# Patient Record
Sex: FEMALE | Race: WHITE | Employment: OTHER | URBAN - NONMETROPOLITAN AREA
[De-identification: names, ages, dates, MRNs, and addresses within clinical notes are randomized per-mention and may not be internally consistent; named-entity substitution may affect disease eponyms.]

---

## 2018-05-24 ENCOUNTER — OFFICE VISIT (OUTPATIENT)
Dept: FAMILY MEDICINE CLINIC | Age: 73
End: 2018-05-24
Payer: MEDICARE

## 2018-05-24 VITALS — HEART RATE: 72 BPM | WEIGHT: 116 LBS | SYSTOLIC BLOOD PRESSURE: 130 MMHG | DIASTOLIC BLOOD PRESSURE: 72 MMHG

## 2018-05-24 DIAGNOSIS — H66.002 ACUTE SUPPURATIVE OTITIS MEDIA OF LEFT EAR WITHOUT SPONTANEOUS RUPTURE OF TYMPANIC MEMBRANE, RECURRENCE NOT SPECIFIED: Primary | ICD-10-CM

## 2018-05-24 PROCEDURE — 99213 OFFICE O/P EST LOW 20 MIN: CPT | Performed by: FAMILY MEDICINE

## 2018-05-24 RX ORDER — MULTIVIT-MIN/IRON/FOLIC ACID/K 18-600-40
CAPSULE ORAL
COMMUNITY

## 2018-05-24 RX ORDER — FLUTICASONE PROPIONATE 50 MCG
1 SPRAY, SUSPENSION (ML) NASAL DAILY
Qty: 1 BOTTLE | Refills: 3 | Status: SHIPPED | OUTPATIENT
Start: 2018-05-24 | End: 2018-06-27 | Stop reason: SDUPTHER

## 2018-05-24 RX ORDER — AMOXICILLIN 875 MG/1
875 TABLET, COATED ORAL 2 TIMES DAILY
Qty: 20 TABLET | Refills: 0 | Status: SHIPPED | OUTPATIENT
Start: 2018-05-24 | End: 2018-06-03

## 2018-05-24 ASSESSMENT — ENCOUNTER SYMPTOMS
SORE THROAT: 0
COUGH: 0
ABDOMINAL PAIN: 0
RHINORRHEA: 0

## 2018-06-02 ENCOUNTER — OFFICE VISIT (OUTPATIENT)
Dept: FAMILY MEDICINE CLINIC | Age: 73
End: 2018-06-02
Payer: MEDICARE

## 2018-06-02 VITALS — HEART RATE: 80 BPM | DIASTOLIC BLOOD PRESSURE: 70 MMHG | WEIGHT: 114 LBS | SYSTOLIC BLOOD PRESSURE: 130 MMHG

## 2018-06-02 DIAGNOSIS — H72.91 CHRONIC OTITIS MEDIA OF RIGHT EAR WITH POSTERIOR PERFORATION: ICD-10-CM

## 2018-06-02 DIAGNOSIS — H66.91 CHRONIC OTITIS MEDIA OF RIGHT EAR WITH POSTERIOR PERFORATION: ICD-10-CM

## 2018-06-02 DIAGNOSIS — H65.23 BILATERAL CHRONIC SEROUS OTITIS MEDIA: ICD-10-CM

## 2018-06-02 DIAGNOSIS — H90.0 CONDUCTIVE HEARING LOSS, BILATERAL: Primary | ICD-10-CM

## 2018-06-02 PROCEDURE — 99213 OFFICE O/P EST LOW 20 MIN: CPT | Performed by: FAMILY MEDICINE

## 2018-06-02 ASSESSMENT — ENCOUNTER SYMPTOMS
DIARRHEA: 0
RHINORRHEA: 0
SORE THROAT: 0

## 2018-06-02 ASSESSMENT — PATIENT HEALTH QUESTIONNAIRE - PHQ9
SUM OF ALL RESPONSES TO PHQ9 QUESTIONS 1 & 2: 0
SUM OF ALL RESPONSES TO PHQ QUESTIONS 1-9: 0
1. LITTLE INTEREST OR PLEASURE IN DOING THINGS: 0
2. FEELING DOWN, DEPRESSED OR HOPELESS: 0

## 2018-06-20 ENCOUNTER — TELEPHONE (OUTPATIENT)
Dept: FAMILY MEDICINE CLINIC | Age: 73
End: 2018-06-20

## 2018-06-27 ENCOUNTER — TELEPHONE (OUTPATIENT)
Dept: FAMILY MEDICINE CLINIC | Age: 73
End: 2018-06-27

## 2018-06-27 DIAGNOSIS — H66.002 ACUTE SUPPURATIVE OTITIS MEDIA OF LEFT EAR WITHOUT SPONTANEOUS RUPTURE OF TYMPANIC MEMBRANE, RECURRENCE NOT SPECIFIED: ICD-10-CM

## 2018-06-27 NOTE — TELEPHONE ENCOUNTER
Spoke with Keeley Cárdenas, stated that they were going to defer any refills at this time but will let us know

## 2018-06-28 RX ORDER — FLUTICASONE PROPIONATE 50 MCG
2 SPRAY, SUSPENSION (ML) NASAL DAILY
Qty: 3 BOTTLE | Refills: 3 | Status: SHIPPED | OUTPATIENT
Start: 2018-06-28 | End: 2019-09-30 | Stop reason: SDUPTHER

## 2019-03-28 ENCOUNTER — OFFICE VISIT (OUTPATIENT)
Dept: FAMILY MEDICINE CLINIC | Age: 74
End: 2019-03-28
Payer: MEDICARE

## 2019-03-28 VITALS
SYSTOLIC BLOOD PRESSURE: 100 MMHG | HEIGHT: 63 IN | DIASTOLIC BLOOD PRESSURE: 58 MMHG | WEIGHT: 107 LBS | HEART RATE: 58 BPM | BODY MASS INDEX: 18.96 KG/M2 | OXYGEN SATURATION: 97 %

## 2019-03-28 DIAGNOSIS — Z87.891 PERSONAL HISTORY OF TOBACCO USE: ICD-10-CM

## 2019-03-28 DIAGNOSIS — Z23 NEED FOR PROPHYLACTIC VACCINATION AND INOCULATION AGAINST VARICELLA: ICD-10-CM

## 2019-03-28 DIAGNOSIS — Z00.00 ROUTINE GENERAL MEDICAL EXAMINATION AT A HEALTH CARE FACILITY: Primary | ICD-10-CM

## 2019-03-28 DIAGNOSIS — Z13.1 SCREENING FOR DIABETES MELLITUS (DM): ICD-10-CM

## 2019-03-28 DIAGNOSIS — Z13.6 SCREENING FOR CARDIOVASCULAR CONDITION: ICD-10-CM

## 2019-03-28 DIAGNOSIS — K13.21: ICD-10-CM

## 2019-03-28 DIAGNOSIS — Z13.220 SCREENING FOR HYPERLIPIDEMIA: ICD-10-CM

## 2019-03-28 DIAGNOSIS — R09.89 BILATERAL CAROTID BRUITS: ICD-10-CM

## 2019-03-28 DIAGNOSIS — M85.89 OSTEOPENIA OF MULTIPLE SITES: ICD-10-CM

## 2019-03-28 DIAGNOSIS — Z11.59 NEED FOR HEPATITIS C SCREENING TEST: ICD-10-CM

## 2019-03-28 DIAGNOSIS — Z78.0 ASYMPTOMATIC MENOPAUSAL STATE: ICD-10-CM

## 2019-03-28 PROCEDURE — G0438 PPPS, INITIAL VISIT: HCPCS | Performed by: FAMILY MEDICINE

## 2019-03-28 ASSESSMENT — PATIENT HEALTH QUESTIONNAIRE - PHQ9
SUM OF ALL RESPONSES TO PHQ QUESTIONS 1-9: 0
2. FEELING DOWN, DEPRESSED OR HOPELESS: 0
SUM OF ALL RESPONSES TO PHQ QUESTIONS 1-9: 0
SUM OF ALL RESPONSES TO PHQ9 QUESTIONS 1 & 2: 0
1. LITTLE INTEREST OR PLEASURE IN DOING THINGS: 0

## 2019-03-28 ASSESSMENT — ENCOUNTER SYMPTOMS
ABDOMINAL PAIN: 0
DIARRHEA: 0
CONSTIPATION: 0
SHORTNESS OF BREATH: 0

## 2019-03-28 ASSESSMENT — LIFESTYLE VARIABLES: HOW OFTEN DO YOU HAVE A DRINK CONTAINING ALCOHOL: 0

## 2019-04-01 LAB
CHOLESTEROL/HDL RATIO: 2.9 RATIO (ref 0–4.5)
CHOLESTEROL: 152 MG/DL (ref 50–200)
HDL, DIRECT: 52 MG/DL (ref 36–68)
HEPATITIS C IGG: NORMAL
LDL CHOLESTEROL CALCULATED: 90.2 MG/DL (ref 0–160)
SIGNAL/CUTOFF: NORMAL
TRIGL SERPL-MCNC: 49 MG/DL (ref 10–250)
VLDLC SERPL CALC-MCNC: 10 MG/DL (ref 0–40)

## 2019-04-03 ENCOUNTER — OFFICE VISIT (OUTPATIENT)
Dept: FAMILY MEDICINE CLINIC | Age: 74
End: 2019-04-03
Payer: MEDICARE

## 2019-04-03 VITALS
SYSTOLIC BLOOD PRESSURE: 116 MMHG | DIASTOLIC BLOOD PRESSURE: 68 MMHG | BODY MASS INDEX: 19.42 KG/M2 | HEART RATE: 79 BPM | WEIGHT: 108.25 LBS | OXYGEN SATURATION: 97 %

## 2019-04-03 DIAGNOSIS — C34.11 MALIGNANT NEOPLASM OF UPPER LOBE OF RIGHT LUNG (HCC): Primary | ICD-10-CM

## 2019-04-03 PROCEDURE — 99214 OFFICE O/P EST MOD 30 MIN: CPT | Performed by: FAMILY MEDICINE

## 2019-04-03 NOTE — PROGRESS NOTES
use, benefit, and side effects of prescribed medications. All patient questions answered. Pt voiced understanding. Reviewed health maintenance. Instructed to continue current medications, diet andexercise. Patient agreed with treatment plan. Follow up as directed.      Electronically signed by Dionicio Keys MD on 4/4/2019

## 2019-04-04 PROBLEM — C34.11 MALIGNANT NEOPLASM OF UPPER LOBE OF RIGHT LUNG (HCC): Status: ACTIVE | Noted: 2019-04-04

## 2019-04-04 ASSESSMENT — ENCOUNTER SYMPTOMS
WHEEZING: 0
SHORTNESS OF BREATH: 0
COUGH: 0

## 2019-04-08 DIAGNOSIS — C34.11 MALIGNANT NEOPLASM OF UPPER LOBE OF RIGHT LUNG (HCC): Primary | ICD-10-CM

## 2019-04-10 LAB — PATHOLOGY REPORT: NORMAL

## 2019-04-11 DIAGNOSIS — C34.11 MALIGNANT NEOPLASM OF UPPER LOBE OF RIGHT LUNG (HCC): Primary | ICD-10-CM

## 2019-04-12 LAB
A/G RATIO: 1.2 RATIO
AGE FOR GFR: 72
ALBUMIN: 3.9 G/DL (ref 3.5–5)
ALK PHOSPHATASE: 89 UNITS/L (ref 38–126)
ALT SERPL-CCNC: 22 UNITS/L (ref 9–52)
ANION GAP SERPL CALCULATED.3IONS-SCNC: 8 MMOL/L
AST SERPL-CCNC: 22 UNITS/L (ref 14–36)
BASOPHILS # BLD: 0.11 THOU/MM3 (ref 0–0.3)
BILIRUB SERPL-MCNC: 0.3 MG/DL (ref 0.2–1.3)
BUN BLDV-MCNC: 17 MG/DL (ref 7–17)
CALCIUM SERPL-MCNC: 9.2 MG/DL (ref 8.4–10.2)
CHLORIDE BLD-SCNC: 104 MMOL/L (ref 98–120)
CO2: 29 MMOL/L (ref 22–31)
CREAT SERPL-MCNC: 0.6 MG/DL (ref 0.5–1)
DIFFERENTIAL: AUTOMATED DIFF
EGFR BF: 119 ML/MIN/1.73 M2
EGFR BM: 161 ML/MIN/1.73 M2
EGFR WF: 98 ML/MIN/1.73 M2
EGFR WM: 132 ML/MIN/1.73 M2
EOSINOPHIL # BLD: 0.12 THOU/MM3 (ref 0–1.1)
GLOBULIN: 3.2 G/DL
GLUCOSE: 116 MG/DL (ref 65–105)
HCT VFR BLD CALC: 38.2 % (ref 37–47)
HEMOGLOBIN: 12.6 G/DL (ref 12–16)
LYMPHOCYTES # BLD: 1.25 THOU/MM3 (ref 1–5.5)
MCH RBC QN AUTO: 30.1 PG (ref 28.5–32)
MCHC RBC AUTO-ENTMCNC: 33 G/DL (ref 32–37)
MCV RBC AUTO: 91.4 FL (ref 80–94)
MONOCYTES # BLD: 1.16 THOU/MM3 (ref 0.1–1)
NEUTROPHILS: 10.68 THOU/MM3 (ref 2–8.1)
PDW BLD-RTO: 11.8 % (ref 8.5–15.5)
PLATELET # BLD: 370 THOU/MM3 (ref 130–400)
PMV BLD AUTO: 5.7 FL (ref 7.4–11)
POTASSIUM SERPL-SCNC: 4.9 MMOL/L (ref 3.6–5)
RBC # BLD: 4.18 M/UL (ref 4.2–5.4)
SODIUM BLD-SCNC: 136 MMOL/L (ref 135–145)
T4 TOTAL: NORMAL MG/DL
TOTAL PROTEIN: 7.1 G/DL (ref 6.3–8.2)
TSH SERPL DL<=0.05 MIU/L-ACNC: 2.57 MIU/ML (ref 0.49–4.6)
WBC # BLD: 13.32 THOU/ML3 (ref 4.8–10)

## 2019-04-16 ENCOUNTER — TELEPHONE (OUTPATIENT)
Dept: FAMILY MEDICINE CLINIC | Age: 74
End: 2019-04-16

## 2019-04-16 ENCOUNTER — TELEPHONE (OUTPATIENT)
Dept: SURGERY | Age: 74
End: 2019-04-16

## 2019-04-16 NOTE — TELEPHONE ENCOUNTER
Needs TCM call. D/c 4/15 from [facility]. TCM visit on 4/18. Also, had chest xray this morning and would like to know results ASAP. Call Carolina leal at [phone].

## 2019-04-16 NOTE — TELEPHONE ENCOUNTER
Jennifer [facility] Transitions Initial Follow Up Call    Outreach made within 2 business days of discharge: Yes    Patient: Ana Sherman Patient : 1945   MRN: [id number]  Reason for Admission:chestbiopsy and chest tube placement  Discharge Date: 4/15/2019      Spoke with: Griselda Jina, doing well and had bowel movement  Discharge department/facility: [facility]    TCM Interactive Patient Contact:  Was patient able to fill all prescriptions: Yes  Was patient instructed to bring all medications to the follow-up visit: Yes  Is patient taking all medications as directed in the discharge summary?  Yes  Does patient understand their discharge instructions: Yes  Does patient have questions or concerns that need addressed prior to 7-14 day follow up office visit: no, they have already heard from Dr. Nayely Field office regarding the chest xray that was obtained this morning    Scheduled appointment with PCP within 7-14 days    Follow Up  Future Appointments   Date Time Provider Ignacio Gracia   2019 10:00 AM MD Romie Falcon LPN

## 2019-04-18 ENCOUNTER — TELEPHONE (OUTPATIENT)
Dept: FAMILY MEDICINE CLINIC | Age: 74
End: 2019-04-18

## 2019-04-18 ENCOUNTER — OFFICE VISIT (OUTPATIENT)
Dept: FAMILY MEDICINE CLINIC | Age: 74
End: 2019-04-18
Payer: MEDICARE

## 2019-04-18 VITALS
SYSTOLIC BLOOD PRESSURE: 100 MMHG | DIASTOLIC BLOOD PRESSURE: 64 MMHG | BODY MASS INDEX: 18.84 KG/M2 | WEIGHT: 105 LBS | OXYGEN SATURATION: 97 % | HEART RATE: 78 BPM

## 2019-04-18 DIAGNOSIS — C34.11 MALIGNANT NEOPLASM OF UPPER LOBE OF RIGHT LUNG (HCC): ICD-10-CM

## 2019-04-18 DIAGNOSIS — J41.0 SIMPLE CHRONIC BRONCHITIS (HCC): ICD-10-CM

## 2019-04-18 DIAGNOSIS — J95.811 POSTPROCEDURAL PNEUMOTHORAX: Primary | ICD-10-CM

## 2019-04-18 PROCEDURE — 99496 TRANSJ CARE MGMT HIGH F2F 7D: CPT | Performed by: FAMILY MEDICINE

## 2019-04-18 PROCEDURE — 1111F DSCHRG MED/CURRENT MED MERGE: CPT | Performed by: FAMILY MEDICINE

## 2019-04-18 RX ORDER — GREEN TEA/HOODIA GORDONII 315-12.5MG
1 CAPSULE ORAL DAILY
Qty: 30 TABLET | Refills: 0 | COMMUNITY
Start: 2019-04-18 | End: 2019-05-03

## 2019-04-18 RX ORDER — IBUPROFEN 200 MG
200 TABLET ORAL EVERY 6 HOURS PRN
COMMUNITY

## 2019-04-18 RX ORDER — HYDROCODONE BITARTRATE AND ACETAMINOPHEN 5; 325 MG/1; MG/1
TABLET ORAL
Refills: 0 | COMMUNITY
Start: 2019-04-15 | End: 2019-10-07 | Stop reason: ALTCHOICE

## 2019-04-18 NOTE — TELEPHONE ENCOUNTER
[employer] called, stated that she did receive a call from Dr. Gustavo Wakefield office, she called back and was told that he is booked out until August or September, but, they do have another provider there Bushra Hernandez, that she can get into see on May 28th, is this alright with you?? Lauren Kaur to call and schedule her appointment with him because at least she would have one scheduled. Eliana Castañeda agreed and will call and schedule

## 2019-04-18 NOTE — PROGRESS NOTES
normal and breath sounds normal. No respiratory distress. She has no wheezes. Overall symmetric BS   Abdominal: Soft. Neurological: She is alert and oriented to person, place, and time. No cranial nerve deficit. Coordination normal.   Skin: Skin is warm. Psychiatric: She has a normal mood and affect. Her behavior is normal. Judgment and thought content normal.   Nursing note and vitals reviewed. Assessment/Plan:  1. Postprocedural pneumothorax  CXR since discharge has been stable, no recurrance. 2. Malignant neoplasm of upper lobe of right lung ([facility] [address]  Continue with the oncology and the pulmonology follow up as planned at this time  - [facility] MED LIST    3. Simple chronic bronchitis ([facility] [address]  Will refer for full PFT's will need these to help plan her treatment with her cancer. Orders placed today  - [id number] - LA BREATHING CAPACITY TEST  - LA BREATHING CAPACITY TEST EVAL BRONCHOSPASM EVAL AFTER BRONCHODIALTOR        Would consider AAS and stool study if the bowels are not improving in a week.     Medical Decision Making: high complexity

## 2019-04-19 ENCOUNTER — TELEPHONE (OUTPATIENT)
Dept: FAMILY MEDICINE CLINIC | Age: 74
End: 2019-04-19

## 2019-04-19 NOTE — TELEPHONE ENCOUNTER
[place]'s daughter Mei Serrano called  Wanted you to be aware that her PET scan will not be on Monday it will be in two weeks May 6th at 1:00 pm.

## 2019-04-22 ENCOUNTER — NURSE ONLY (OUTPATIENT)
Dept: FAMILY MEDICINE CLINIC | Age: 74
End: 2019-04-22

## 2019-04-22 DIAGNOSIS — Z48.02 VISIT FOR SUTURE REMOVAL: ICD-10-CM

## 2019-04-22 NOTE — TELEPHONE ENCOUNTER
Patient in office stated that she did see Dr. Shea [place] this am due to someone cancelling apointment

## 2019-04-22 NOTE — TELEPHONE ENCOUNTER
Notified Brandon Brock, daughter-in-law. She also let us know that Elzbieta's PET scan got denied and so is being postponed until an appeal can be done.

## 2019-04-28 PROBLEM — J41.0 SIMPLE CHRONIC BRONCHITIS (HCC): Status: ACTIVE | Noted: 2019-04-28

## 2019-05-23 ENCOUNTER — OFFICE VISIT (OUTPATIENT)
Dept: FAMILY MEDICINE CLINIC | Age: 74
End: 2019-05-23
Payer: MEDICARE

## 2019-05-23 VITALS
SYSTOLIC BLOOD PRESSURE: 110 MMHG | DIASTOLIC BLOOD PRESSURE: 64 MMHG | OXYGEN SATURATION: 98 % | WEIGHT: 111 LBS | HEART RATE: 74 BPM | BODY MASS INDEX: 19.91 KG/M2

## 2019-05-23 DIAGNOSIS — F41.9 ANXIETY: ICD-10-CM

## 2019-05-23 DIAGNOSIS — R35.0 FREQUENCY OF URINATION: ICD-10-CM

## 2019-05-23 LAB
BILIRUBIN, POC: ABNORMAL
BLOOD URINE, POC: ABNORMAL
CHP ED QC CHECK: NORMAL
CLARITY, POC: CLEAR
COLOR, POC: YELLOW
GLUCOSE BLD-MCNC: 135 MG/DL
GLUCOSE URINE, POC: ABNORMAL
KETONES, POC: ABNORMAL
LEUKOCYTE EST, POC: ABNORMAL
NITRITE, POC: ABNORMAL
PH, POC: 5
PROTEIN, POC: ABNORMAL
SPECIFIC GRAVITY, POC: 1.01
URINE CULTURE, ROUTINE: NORMAL
UROBILINOGEN, POC: ABNORMAL

## 2019-05-23 PROCEDURE — 99214 OFFICE O/P EST MOD 30 MIN: CPT | Performed by: FAMILY MEDICINE

## 2019-05-23 PROCEDURE — 81002 URINALYSIS NONAUTO W/O SCOPE: CPT | Performed by: FAMILY MEDICINE

## 2019-05-23 PROCEDURE — 82962 GLUCOSE BLOOD TEST: CPT | Performed by: FAMILY MEDICINE

## 2019-05-23 RX ORDER — ESCITALOPRAM OXALATE 10 MG/1
10 TABLET ORAL DAILY
Qty: 30 TABLET | Refills: 5 | Status: SHIPPED | OUTPATIENT
Start: 2019-05-23 | End: 2019-10-30 | Stop reason: SDUPTHER

## 2019-05-23 RX ORDER — PROCHLORPERAZINE MALEATE 10 MG
1 TABLET ORAL EVERY 6 HOURS PRN
Refills: 0 | COMMUNITY
Start: 2019-05-16 | End: 2021-03-29

## 2019-05-23 NOTE — PROGRESS NOTES
[address] [address] [facility], [address] [place] [address]  Dept: [phone]  Dept ZOW:[phone]    Cherri Nur is a 68 y.o. female who presents today for her medical conditions/complaints as notedbelow. Cherri Nur is c/o of 1 Month Follow-Up (felling really tired, not getting enough sleep through out the night urinating all the time denies any pain does not pay any attention during the day regarding the urination)      HPI:     HPI  Has not been sleeping well at night, is exhausted. Is up about 5 times at night urinating. Every time she gets up it is a lot. Cannot even take a nap at night because she cannot sleep. Will go back to sleep usually but sometimes will be up for hours. Has been anxious about the entire process overall. Has been OK overall. Thinks she is tolerating it ok most of the time though.       BP Readings from Last 3 Encounters:   05/23/19 110/64   04/18/19 100/64   04/03/19 116/68          (goal 120/80)     Wt Readings from Last 3 Encounters:   05/23/19 111 lb (50.3 kg)   04/18/19 105 lb (47.6 kg)   04/03/19 108 lb 4 oz (49.1 kg)        Past Medical History:   Diagnosis Date    Leukoplakia, gingiva     Osteopenia       Past Surgical History:   Procedure Laterality Date    BRONCHOSCOPY  05/02/2019    CATARACT REMOVAL Bilateral     COLONOSCOPY  2009    tubular adenoma with low grade dysplasia     TONSILLECTOMY AND ADENOIDECTOMY      TUBAL LIGATION         Family History   Problem Relation Age of Onset    Cancer Mother         multiple myeloma    Cancer Father         throat cancer    Thyroid Disease Sister     Thyroid Disease Sister     Thyroid Disease Daughter        Social History     Tobacco Use    Smoking status: Current Every Day Smoker     Packs/day: 0.50     Years: 53.00     Pack years: 26.50     Types: Cigarettes     Start date: 3/28/1966    Smokeless tobacco: Never Used    Tobacco comment: up to 1 ppd   Substance Use Topics    no mass. There is no tenderness. There is no guarding. Neurological: She is alert and oriented to person, place, and time. No cranial nerve deficit. Coordination normal.   Skin: Skin is warm. Psychiatric: She has a normal mood and affect. Her behavior is normal. Judgment and thought content normal.   Nursing note and vitals reviewed. Assessment/Plan:      Diagnosis Orders   1. Anxiety  escitalopram (LEXAPRO) 10 MG tablet   2. Frequency of urination  POCT Urinalysis no Micro    Urine Culture    POCT Glucose     Will treat the anxiety and hopefully this will allow Elzbieta to sleep better. Will check the urine culture in light of the chemo to ensure low grade infection is not overlooked. No evidence of DMi developing with the check at this time  Start the escitalopram 1/2 tablet daily at night. In about 1 week increase this to 1 full tablet at night. Moderate complexity. Lab Results   Component Value Date    WBC 10.98 (H) 05/22/2019    HGB 12.5 05/22/2019    HCT 39.0 05/22/2019     (H) 05/22/2019    CHOL 152 04/01/2019    TRIG 49 04/01/2019    ALT 46 05/22/2019    AST 43 (H) 05/22/2019     05/22/2019    K 4.2 05/22/2019     05/22/2019    CREATININE 0.6 05/22/2019    BUN 20 (H) 05/22/2019    CO2 27 05/22/2019    TSH 2.57 04/12/2019    INR 1.0 05/13/2019       Return in about 1 month (around 6/20/2019). Patient given educational materials - see patientinstructions. Discussed use, benefit, and side effects of prescribed medications. All patient questions answered. Pt voiced understanding. Reviewed health maintenance. Instructed to continue current medications, diet andexercise. Patient agreed with treatment plan. Follow up as directed.      Electronically signed by Elia Duane, MD on 5/26/2019

## 2019-05-23 NOTE — PATIENT INSTRUCTIONS
Start the escitalopram 1/2 tablet daily at night. In about 1 week increase this to 1 full tablet at night.

## 2019-06-21 ENCOUNTER — TELEPHONE (OUTPATIENT)
Dept: FAMILY MEDICINE CLINIC | Age: 74
End: 2019-06-21

## 2019-06-21 RX ORDER — VITAMIN E (DL,TOCOPHERYL ACET) 180 MG
1 CAPSULE ORAL DAILY
Qty: 30 CAPSULE | Refills: 5 | Status: SHIPPED | OUTPATIENT
Start: 2019-06-21 | End: 2019-09-30 | Stop reason: ALTCHOICE

## 2019-06-21 NOTE — TELEPHONE ENCOUNTER
Script sent to [facility]-- please notify the patient and oncology. Let patient know does occasionally cause diarrhea-- ifthis increases may need to cut the dose down.

## 2019-09-27 ENCOUNTER — TELEPHONE (OUTPATIENT)
Dept: FAMILY MEDICINE CLINIC | Age: 74
End: 2019-09-27

## 2019-09-27 DIAGNOSIS — R11.0 NAUSEA: Primary | ICD-10-CM

## 2019-09-27 RX ORDER — ONDANSETRON 4 MG/1
4 TABLET, FILM COATED ORAL DAILY PRN
Qty: 30 TABLET | Refills: 0 | Status: SHIPPED | OUTPATIENT
Start: 2019-09-27 | End: 2021-03-29

## 2019-09-30 ENCOUNTER — OFFICE VISIT (OUTPATIENT)
Dept: FAMILY MEDICINE CLINIC | Age: 74
End: 2019-09-30
Payer: MEDICARE

## 2019-09-30 VITALS
BODY MASS INDEX: 18.84 KG/M2 | HEART RATE: 87 BPM | DIASTOLIC BLOOD PRESSURE: 62 MMHG | WEIGHT: 105 LBS | SYSTOLIC BLOOD PRESSURE: 104 MMHG | OXYGEN SATURATION: 94 %

## 2019-09-30 DIAGNOSIS — C34.11 MALIGNANT NEOPLASM OF UPPER LOBE OF RIGHT LUNG (HCC): ICD-10-CM

## 2019-09-30 DIAGNOSIS — J41.0 SIMPLE CHRONIC BRONCHITIS (HCC): ICD-10-CM

## 2019-09-30 DIAGNOSIS — E86.0 DEHYDRATION: ICD-10-CM

## 2019-09-30 DIAGNOSIS — R19.7 DIARRHEA OF PRESUMED INFECTIOUS ORIGIN: Primary | ICD-10-CM

## 2019-09-30 LAB
A/G RATIO: 1.2 RATIO
AGE FOR GFR: 72
ALBUMIN: 3.8 G/DL (ref 3.5–5)
ALK PHOSPHATASE: 83 UNITS/L (ref 38–126)
ALT SERPL-CCNC: 22 UNITS/L (ref 9–52)
ANION GAP SERPL CALCULATED.3IONS-SCNC: 17 MMOL/L
AST SERPL-CCNC: 30 UNITS/L (ref 14–36)
BASOPHILS # BLD: 0.11 THOU/MM3 (ref 0–0.3)
BILIRUB SERPL-MCNC: 0.3 MG/DL (ref 0.2–1.3)
BUN BLDV-MCNC: 19 MG/DL (ref 7–17)
C DIFF TOXIN: NORMAL
CALCIUM SERPL-MCNC: 9.5 MG/DL (ref 8.4–10.2)
CHLORIDE BLD-SCNC: 108 MMOL/L (ref 98–120)
CO2: 19 MMOL/L (ref 22–31)
CREAT SERPL-MCNC: 1 MG/DL (ref 0.5–1)
CULTURE, STOOL: NORMAL
DIFFERENTIAL: AUTOMATED DIFF
EGFR BF: 66 ML/MIN/1.73 M2
EGFR BM: 89 ML/MIN/1.73 M2
EGFR WF: 55 ML/MIN/1.73 M2
EGFR WM: 73 ML/MIN/1.73 M2
EOSINOPHIL # BLD: 0.09 THOU/MM3 (ref 0–1.1)
GLOBULIN: 3.3 G/DL
GLUCOSE: 112 MG/DL (ref 65–105)
HCT VFR BLD CALC: 38.1 % (ref 37–47)
HEMOCCULT STL QL: POSITIVE
HEMOGLOBIN: 11.9 G/DL (ref 12–16)
LYMPHOCYTES # BLD: 0.45 THOU/MM3 (ref 1–5.5)
MAGNESIUM: 1.7 MG/DL (ref 1.6–2.3)
MCH RBC QN AUTO: 30 PG (ref 28.5–32)
MCHC RBC AUTO-ENTMCNC: 31.1 G/DL (ref 32–37)
MCV RBC AUTO: 96.4 FL (ref 80–94)
MONOCYTES # BLD: 0.85 THOU/MM3 (ref 0.1–1)
NEUTROPHILS: 3.48 THOU/MM3 (ref 2–8.1)
PDW BLD-RTO: 13 % (ref 8.5–15.5)
PLATELET # BLD: 314 THOU/MM3 (ref 130–400)
PMV BLD AUTO: 5.8 FL (ref 7.4–11)
POTASSIUM SERPL-SCNC: 4.1 MMOL/L (ref 3.6–5)
RBC # BLD: 3.95 M/UL (ref 4.2–5.4)
SODIUM BLD-SCNC: 140 MMOL/L (ref 135–145)
STOOL FOR WBC: NORMAL
TOTAL PROTEIN: 7.1 G/DL (ref 6.3–8.2)
WBC # BLD: 4.97 THOU/ML3 (ref 4.8–10)

## 2019-09-30 RX ORDER — FLUTICASONE PROPIONATE 50 MCG
2 SPRAY, SUSPENSION (ML) NASAL DAILY
Qty: 3 BOTTLE | Refills: 3 | Status: SHIPPED | OUTPATIENT
Start: 2019-09-30 | End: 2020-10-12 | Stop reason: SDUPTHER

## 2019-09-30 RX ORDER — ACETAMINOPHEN 500 MG
500 TABLET ORAL EVERY 6 HOURS PRN
COMMUNITY

## 2019-09-30 RX ORDER — LIDOCAINE AND PRILOCAINE 25; 25 MG/G; MG/G
CREAM TOPICAL
Refills: 0 | COMMUNITY
Start: 2019-09-20

## 2019-10-01 LAB
A/G RATIO: 1 RATIO
AGE FOR GFR: 73
ALBUMIN: 2.8 G/DL (ref 3.5–5)
ALK PHOSPHATASE: 65 UNITS/L (ref 38–126)
ALT SERPL-CCNC: 27 UNITS/L (ref 9–52)
ANION GAP SERPL CALCULATED.3IONS-SCNC: 11 MMOL/L
AST SERPL-CCNC: 21 UNITS/L (ref 14–36)
BASOPHILS # BLD: 0.05 THOU/MM3 (ref 0–0.3)
BILIRUB SERPL-MCNC: 0.2 MG/DL (ref 0.2–1.3)
BUN BLDV-MCNC: 12 MG/DL (ref 7–17)
CALCIUM SERPL-MCNC: 8.2 MG/DL (ref 8.4–10.2)
CHLORIDE BLD-SCNC: 115 MMOL/L (ref 98–120)
CO2: 16 MMOL/L (ref 22–31)
CREAT SERPL-MCNC: 0.7 MG/DL (ref 0.5–1)
DIFFERENTIAL: AUTOMATED DIFF
EGFR BF: 99 ML/MIN/1.73 M2
EGFR BM: 134 ML/MIN/1.73 M2
EGFR WF: 82 ML/MIN/1.73 M2
EGFR WM: 111 ML/MIN/1.73 M2
EOSINOPHIL # BLD: 0.22 THOU/MM3 (ref 0–1.1)
GLOBULIN: 2.8 G/DL
GLUCOSE: 90 MG/DL (ref 65–105)
HCT VFR BLD CALC: 31.1 % (ref 37–47)
HEMOGLOBIN: 9.8 G/DL (ref 12–16)
LYMPHOCYTES # BLD: 0.66 THOU/MM3 (ref 1–5.5)
MCH RBC QN AUTO: 29.8 PG (ref 28.5–32)
MCHC RBC AUTO-ENTMCNC: 31.5 G/DL (ref 32–37)
MCV RBC AUTO: 94.8 FL (ref 80–94)
MONOCYTES # BLD: 0.9 THOU/MM3 (ref 0.1–1)
NEUTROPHILS: 1.59 THOU/MM3 (ref 2–8.1)
PDW BLD-RTO: 12.9 % (ref 8.5–15.5)
PLATELET # BLD: 280 THOU/MM3 (ref 130–400)
PMV BLD AUTO: 6.1 FL (ref 7.4–11)
POTASSIUM SERPL-SCNC: 3.3 MMOL/L (ref 3.6–5)
RBC # BLD: 3.28 M/UL (ref 4.2–5.4)
SODIUM BLD-SCNC: 139 MMOL/L (ref 135–145)
TOTAL PROTEIN: 5.6 G/DL (ref 6.3–8.2)
WBC # BLD: 3.43 THOU/ML3 (ref 4.8–10)

## 2019-10-01 ASSESSMENT — ENCOUNTER SYMPTOMS
SHORTNESS OF BREATH: 0
CHOKING: 0
COUGH: 0
WHEEZING: 0

## 2019-10-02 LAB
AGE FOR GFR: 73
ANION GAP SERPL CALCULATED.3IONS-SCNC: 12 MMOL/L
BASOPHILS # BLD: 0.09 THOU/MM3 (ref 0–0.3)
BUN BLDV-MCNC: 7 MG/DL (ref 7–17)
CHLORIDE BLD-SCNC: 113 MMOL/L (ref 98–120)
CO2: 18 MMOL/L (ref 22–31)
CREAT SERPL-MCNC: 0.7 MG/DL (ref 0.5–1)
DIFFERENTIAL: AUTOMATED DIFF
EGFR BF: 99 ML/MIN/1.73 M2
EGFR BM: 134 ML/MIN/1.73 M2
EGFR WF: 82 ML/MIN/1.73 M2
EGFR WM: 111 ML/MIN/1.73 M2
EOSINOPHIL # BLD: 0.33 THOU/MM3 (ref 0–1.1)
HCT VFR BLD CALC: 30.3 % (ref 37–47)
HEMOGLOBIN: 9.9 G/DL (ref 12–16)
LYMPHOCYTES # BLD: 0.57 THOU/MM3 (ref 1–5.5)
MCH RBC QN AUTO: 31.3 PG (ref 28.5–32)
MCHC RBC AUTO-ENTMCNC: 32.8 G/DL (ref 32–37)
MCV RBC AUTO: 95.5 FL (ref 80–94)
MONOCYTES # BLD: 0.93 THOU/MM3 (ref 0.1–1)
NEUTROPHILS: 2.75 THOU/MM3 (ref 2–8.1)
PDW BLD-RTO: 12.9 % (ref 8.5–15.5)
PLATELET # BLD: 249 THOU/MM3 (ref 130–400)
PMV BLD AUTO: 5.8 FL (ref 7.4–11)
POTASSIUM SERPL-SCNC: 3.8 MMOL/L (ref 3.6–5)
RBC # BLD: 3.17 M/UL (ref 4.2–5.4)
SODIUM BLD-SCNC: 139 MMOL/L (ref 135–145)
WBC # BLD: 4.68 THOU/ML3 (ref 4.8–10)

## 2019-10-06 ENCOUNTER — TELEPHONE (OUTPATIENT)
Dept: FAMILY MEDICINE CLINIC | Age: 74
End: 2019-10-06

## 2019-10-07 ENCOUNTER — TELEPHONE (OUTPATIENT)
Dept: FAMILY MEDICINE CLINIC | Age: 74
End: 2019-10-07

## 2019-10-07 ENCOUNTER — OFFICE VISIT (OUTPATIENT)
Dept: FAMILY MEDICINE CLINIC | Age: 74
End: 2019-10-07
Payer: MEDICARE

## 2019-10-07 VITALS
BODY MASS INDEX: 20.44 KG/M2 | WEIGHT: 113.9 LBS | SYSTOLIC BLOOD PRESSURE: 118 MMHG | OXYGEN SATURATION: 96 % | DIASTOLIC BLOOD PRESSURE: 72 MMHG | HEART RATE: 86 BPM

## 2019-10-07 DIAGNOSIS — R19.7 DIARRHEA OF PRESUMED INFECTIOUS ORIGIN: Primary | ICD-10-CM

## 2019-10-07 DIAGNOSIS — C34.11 MALIGNANT NEOPLASM OF UPPER LOBE OF RIGHT LUNG (HCC): ICD-10-CM

## 2019-10-07 DIAGNOSIS — E86.0 DEHYDRATION: ICD-10-CM

## 2019-10-07 DIAGNOSIS — F41.9 ANXIETY: ICD-10-CM

## 2019-10-07 PROCEDURE — 99495 TRANSJ CARE MGMT MOD F2F 14D: CPT | Performed by: FAMILY MEDICINE

## 2019-10-07 RX ORDER — DICYCLOMINE HYDROCHLORIDE 10 MG/1
CAPSULE ORAL
Refills: 0 | COMMUNITY
Start: 2019-10-04 | End: 2019-10-18 | Stop reason: SDUPTHER

## 2019-10-07 RX ORDER — PREDNISONE 20 MG/1
TABLET ORAL
Refills: 0 | COMMUNITY
Start: 2019-10-04 | End: 2019-10-07 | Stop reason: ALTCHOICE

## 2019-10-07 RX ORDER — MONTELUKAST SODIUM 4 MG/1
1 TABLET, CHEWABLE ORAL 2 TIMES DAILY
Qty: 60 TABLET | Refills: 3 | Status: SHIPPED | OUTPATIENT
Start: 2019-10-07 | End: 2020-08-26

## 2019-10-07 RX ORDER — MIRTAZAPINE 15 MG/1
TABLET, FILM COATED ORAL
Refills: 0 | COMMUNITY
Start: 2019-10-04 | End: 2019-10-30 | Stop reason: SDUPTHER

## 2019-10-09 ENCOUNTER — TELEPHONE (OUTPATIENT)
Dept: FAMILY MEDICINE CLINIC | Age: 74
End: 2019-10-09

## 2019-10-18 ENCOUNTER — TELEPHONE (OUTPATIENT)
Dept: FAMILY MEDICINE CLINIC | Age: 74
End: 2019-10-18

## 2019-10-18 RX ORDER — DICYCLOMINE HYDROCHLORIDE 10 MG/1
20 CAPSULE ORAL 4 TIMES DAILY PRN
Qty: 120 CAPSULE | Refills: 2 | Status: SHIPPED | OUTPATIENT
Start: 2019-10-18 | End: 2019-11-29 | Stop reason: SDUPTHER

## 2019-10-18 RX ORDER — DICYCLOMINE HYDROCHLORIDE 10 MG/1
CAPSULE ORAL
Qty: 120 CAPSULE | Refills: 0 | Status: SHIPPED | OUTPATIENT
Start: 2019-10-18 | End: 2019-10-18 | Stop reason: SDUPTHER

## 2019-10-30 ENCOUNTER — OFFICE VISIT (OUTPATIENT)
Dept: FAMILY MEDICINE CLINIC | Age: 74
End: 2019-10-30
Payer: MEDICARE

## 2019-10-30 VITALS
DIASTOLIC BLOOD PRESSURE: 72 MMHG | SYSTOLIC BLOOD PRESSURE: 114 MMHG | OXYGEN SATURATION: 96 % | HEART RATE: 77 BPM | WEIGHT: 109.3 LBS | BODY MASS INDEX: 19.61 KG/M2

## 2019-10-30 DIAGNOSIS — F41.9 ANXIETY: ICD-10-CM

## 2019-10-30 DIAGNOSIS — C34.12 MALIGNANT NEOPLASM OF UPPER LOBE OF LEFT LUNG (HCC): ICD-10-CM

## 2019-10-30 DIAGNOSIS — K52.9 CHRONIC DIARRHEA OF UNKNOWN ORIGIN: Primary | ICD-10-CM

## 2019-10-30 PROBLEM — C34.10 LUNG CANCER, UPPER LOBE (HCC): Status: ACTIVE | Noted: 2019-01-01

## 2019-10-30 PROCEDURE — 99214 OFFICE O/P EST MOD 30 MIN: CPT | Performed by: FAMILY MEDICINE

## 2019-10-30 RX ORDER — MIRTAZAPINE 15 MG/1
TABLET, FILM COATED ORAL
Qty: 15 TABLET | Refills: 5 | Status: SHIPPED | OUTPATIENT
Start: 2019-10-30 | End: 2019-11-04 | Stop reason: ALTCHOICE

## 2019-10-30 RX ORDER — ESCITALOPRAM OXALATE 10 MG/1
15 TABLET ORAL DAILY
Qty: 45 TABLET | Refills: 5 | Status: SHIPPED | OUTPATIENT
Start: 2019-10-30 | End: 2019-11-18

## 2019-11-04 ENCOUNTER — OFFICE VISIT (OUTPATIENT)
Dept: FAMILY MEDICINE CLINIC | Age: 74
End: 2019-11-04
Payer: MEDICARE

## 2019-11-04 VITALS
DIASTOLIC BLOOD PRESSURE: 74 MMHG | OXYGEN SATURATION: 94 % | SYSTOLIC BLOOD PRESSURE: 122 MMHG | WEIGHT: 105.6 LBS | BODY MASS INDEX: 18.95 KG/M2 | HEART RATE: 73 BPM

## 2019-11-04 DIAGNOSIS — F41.8 ANXIETY WITH DEPRESSION: ICD-10-CM

## 2019-11-04 DIAGNOSIS — Z86.59 MENTAL STATUS CHANGE RESOLVED: ICD-10-CM

## 2019-11-04 DIAGNOSIS — K52.9 CHRONIC DIARRHEA OF UNKNOWN ORIGIN: Primary | ICD-10-CM

## 2019-11-04 DIAGNOSIS — C34.12 MALIGNANT NEOPLASM OF UPPER LOBE OF LEFT LUNG (HCC): ICD-10-CM

## 2019-11-04 DIAGNOSIS — R41.82 ALTERED MENTAL STATUS, UNSPECIFIED ALTERED MENTAL STATUS TYPE: ICD-10-CM

## 2019-11-04 PROCEDURE — 99214 OFFICE O/P EST MOD 30 MIN: CPT | Performed by: FAMILY MEDICINE

## 2019-11-16 ENCOUNTER — TELEPHONE (OUTPATIENT)
Dept: FAMILY MEDICINE CLINIC | Age: 74
End: 2019-11-16

## 2019-11-18 ENCOUNTER — OFFICE VISIT (OUTPATIENT)
Dept: FAMILY MEDICINE CLINIC | Age: 74
End: 2019-11-18
Payer: MEDICARE

## 2019-11-18 VITALS
BODY MASS INDEX: 18.61 KG/M2 | WEIGHT: 103.7 LBS | HEART RATE: 83 BPM | DIASTOLIC BLOOD PRESSURE: 76 MMHG | SYSTOLIC BLOOD PRESSURE: 124 MMHG | OXYGEN SATURATION: 97 %

## 2019-11-18 DIAGNOSIS — F41.9 ANXIETY: ICD-10-CM

## 2019-11-18 DIAGNOSIS — K52.9 CHRONIC DIARRHEA OF UNKNOWN ORIGIN: Primary | ICD-10-CM

## 2019-11-18 PROCEDURE — 99214 OFFICE O/P EST MOD 30 MIN: CPT | Performed by: FAMILY MEDICINE

## 2019-11-18 RX ORDER — ESCITALOPRAM OXALATE 10 MG/1
10 TABLET ORAL DAILY
Qty: 30 TABLET | Refills: 5
Start: 2019-11-18 | End: 2020-08-26 | Stop reason: SDUPTHER

## 2019-11-20 DIAGNOSIS — K52.9 CHRONIC DIARRHEA OF UNKNOWN ORIGIN: ICD-10-CM

## 2019-11-29 RX ORDER — DICYCLOMINE HYDROCHLORIDE 10 MG/1
20 CAPSULE ORAL 4 TIMES DAILY PRN
Qty: 120 CAPSULE | Refills: 2 | Status: SHIPPED | OUTPATIENT
Start: 2019-11-29 | End: 2021-03-29

## 2019-12-26 DIAGNOSIS — R35.0 FREQUENCY OF URINATION: ICD-10-CM

## 2020-02-24 LAB
CALCIUM SERPL-MCNC: 9.5 MG/DL (ref 8.4–10.2)
FERRITIN: 22.8 NG/DL (ref 10–282)
FOLATE: 5.1 NG/ML (ref 2.8–20)
IRON SATURATION: 12 % (ref 15–38)
IRON: 42 MG/DL (ref 37–170)
TOTAL IRON BINDING CAPACITY: 356 MG/DL (ref 261–497)
VITAMIN B-12: 192 PG/ML (ref 239–931)
VITAMIN D 25-HYDROXY: 18.4 NG/ML (ref 30–100)

## 2020-02-26 LAB
ALPHA-TOCOPHEROL: NORMAL
GAMMA-TOCOPHEROL: NORMAL
INTERPRETATION: NORMAL
RETINYL PALMITATE: NORMAL
TISSUE TRANSGLUTAMINASE IGA: NORMAL
VITAMIN A: NORMAL

## 2020-03-12 LAB
ALBUMIN/GLOBULIN RATIO: 1.3 G/DL
ALBUMIN: 3.9 G/DL (ref 3.5–5)
ALP BLD-CCNC: 101 UNITS/L (ref 38–126)
ALT SERPL-CCNC: 13 UNITS/L (ref 9–52)
ANION GAP SERPL CALCULATED.3IONS-SCNC: 8.2 MMOL/L
AST SERPL-CCNC: 23 UNITS/L (ref 14–36)
BASOPHILS %: 2.02 (ref 0–3)
BASOPHILS ABSOLUTE: 0.18 (ref 0–0.3)
BILIRUB SERPL-MCNC: 0.2 MG/DL (ref 0.2–1.3)
BUN BLDV-MCNC: 12 MG/DL (ref 7–17)
CALCIUM SERPL-MCNC: 9.5 MG/DL (ref 8.4–10.2)
CHLORIDE BLD-SCNC: 103 MMOL/L (ref 98–120)
CO2: 27 MMOL/L (ref 22–31)
CREAT SERPL-MCNC: 0.7 MG/DL (ref 0.5–1)
EOSINOPHILS %: 3.93 (ref 0–10)
EOSINOPHILS ABSOLUTE: 0.35 (ref 0–1.1)
GFR CALCULATED: > 60
GLOBULIN: 3 G/DL
GLUCOSE: 87 MG/DL (ref 65–105)
HCT VFR BLD CALC: 33.7 % (ref 37–47)
HEMOGLOBIN: 10.8 (ref 12–16)
LYMPHOCYTE %: 14.61 (ref 20–51.1)
LYMPHOCYTES ABSOLUTE: 1.32 (ref 1–5.5)
MCH RBC QN AUTO: 30.7 PG (ref 28.5–32.5)
MCHC RBC AUTO-ENTMCNC: 32.1 G/DL (ref 32–37)
MCV RBC AUTO: 95.7 FL (ref 80–94)
MONOCYTES %: 9.61 (ref 1.7–9.3)
MONOCYTES ABSOLUTE: 0.87 (ref 0.1–1)
NEUTROPHILS %: 69.83 (ref 42.2–75.2)
NEUTROPHILS ABSOLUTE: 6.3 (ref 2–8.1)
PDW BLD-RTO: 14.4 % (ref 8.5–15.5)
PLATELET # BLD: 463.1 THOU/MM3 (ref 130–400)
POTASSIUM SERPL-SCNC: 5 MMOL/L (ref 3.6–5)
RBC: 3.52 M/UL (ref 4.2–5.4)
SODIUM BLD-SCNC: 138 MMOL/L (ref 135–145)
TOTAL PROTEIN, SERUM: 6.9 G/DL (ref 6.3–8.2)
WBC: 9 THOU/ML3 (ref 4.8–10.8)

## 2020-04-22 LAB
ALBUMIN/GLOBULIN RATIO: 1.14 G/DL
ALBUMIN: 4 G/DL (ref 3.5–5)
ALP BLD-CCNC: 134 UNITS/L (ref 38–126)
ALT SERPL-CCNC: 27 UNITS/L (ref 9–52)
ANION GAP SERPL CALCULATED.3IONS-SCNC: 15.2 MMOL/L
AST SERPL-CCNC: 42 UNITS/L (ref 14–36)
BASOPHILS %: 1.34 (ref 0–3)
BASOPHILS ABSOLUTE: 0.12 (ref 0–0.3)
BILIRUB SERPL-MCNC: 0.2 MG/DL (ref 0.2–1.3)
BUN BLDV-MCNC: 14 MG/DL (ref 7–17)
CALCIUM SERPL-MCNC: 9.2 MG/DL (ref 8.4–10.2)
CHLORIDE BLD-SCNC: 101 MMOL/L (ref 98–120)
CO2: 24 MMOL/L (ref 22–31)
CREAT SERPL-MCNC: 0.7 MG/DL (ref 0.5–1)
CREATININE CLEARANCE: 56.61
EOSINOPHILS %: 3.05 (ref 0–10)
EOSINOPHILS ABSOLUTE: 0.27 (ref 0–1.1)
GFR CALCULATED: > 60
GLOBULIN: 3.5 G/DL
GLUCOSE: 162 MG/DL (ref 65–105)
HCT VFR BLD CALC: 33.5 % (ref 37–47)
HEMOGLOBIN: 10.2 (ref 12–16)
LYMPHOCYTE %: 12.45 (ref 20–51.1)
LYMPHOCYTES ABSOLUTE: 1.09 (ref 1–5.5)
MAGNESIUM: 1.5 MG/DL (ref 1.6–2.3)
MCH RBC QN AUTO: 28.9 PG (ref 28.5–32.5)
MCHC RBC AUTO-ENTMCNC: 30.5 G/DL (ref 32–37)
MCV RBC AUTO: 94.8 FL (ref 80–94)
MONOCYTES %: 12.35 (ref 1.7–9.3)
MONOCYTES ABSOLUTE: 1.08 (ref 0.1–1)
NEUTROPHILS %: 70.82 (ref 42.2–75.2)
NEUTROPHILS ABSOLUTE: 6.19 (ref 2–8.1)
PDW BLD-RTO: 13.2 % (ref 8.5–15.5)
PLATELET # BLD: 459.6 THOU/MM3 (ref 130–400)
POTASSIUM SERPL-SCNC: 4.2 MMOL/L (ref 3.6–5)
RBC: 3.54 M/UL (ref 4.2–5.4)
SODIUM BLD-SCNC: 136 MMOL/L (ref 135–145)
T3 FREE: 2.83 PG/ML (ref 2.02–4.43)
TOTAL PROTEIN, SERUM: 7.5 G/DL (ref 6.3–8.2)
TSH REFLEX FT4: 1.95 MIU/ML (ref 0.49–4.67)
WBC: 8.7 THOU/ML3 (ref 4.8–10.8)

## 2020-08-26 ENCOUNTER — HOSPITAL ENCOUNTER (OUTPATIENT)
Age: 75
Setting detail: SPECIMEN
Discharge: HOME | End: 2020-08-26
Payer: MEDICARE

## 2020-08-26 ENCOUNTER — OFFICE VISIT (OUTPATIENT)
Dept: FAMILY MEDICINE CLINIC | Age: 75
End: 2020-08-26
Payer: MEDICARE

## 2020-08-26 VITALS
BODY MASS INDEX: 20.04 KG/M2 | OXYGEN SATURATION: 98 % | HEART RATE: 92 BPM | SYSTOLIC BLOOD PRESSURE: 128 MMHG | WEIGHT: 111.7 LBS | DIASTOLIC BLOOD PRESSURE: 82 MMHG

## 2020-08-26 LAB
BILIRUBIN, POC: NEGATIVE
BLOOD URINE, POC: NORMAL
CLARITY, POC: NORMAL
COLOR, POC: NORMAL
GLUCOSE URINE, POC: NEGATIVE
KETONES, POC: NORMAL
LEUKOCYTE EST, POC: NORMAL
NITRITE, POC: NEGATIVE
PH, POC: 6
PROTEIN, POC: NORMAL
SPECIFIC GRAVITY, POC: 1.01
UROBILINOGEN, POC: 0.2

## 2020-08-26 PROCEDURE — 99212 OFFICE O/P EST SF 10 MIN: CPT | Performed by: FAMILY MEDICINE

## 2020-08-26 PROCEDURE — 81002 URINALYSIS NONAUTO W/O SCOPE: CPT | Performed by: FAMILY MEDICINE

## 2020-08-26 PROCEDURE — 87086 URINE CULTURE/COLONY COUNT: CPT

## 2020-08-26 PROCEDURE — 87088 URINE BACTERIA CULTURE: CPT

## 2020-08-26 PROCEDURE — 87186 SC STD MICRODIL/AGAR DIL: CPT

## 2020-08-26 PROCEDURE — 99214 OFFICE O/P EST MOD 30 MIN: CPT | Performed by: FAMILY MEDICINE

## 2020-08-26 RX ORDER — ESCITALOPRAM OXALATE 10 MG/1
10 TABLET ORAL DAILY
Qty: 30 TABLET | Refills: 5 | Status: SHIPPED | OUTPATIENT
Start: 2020-08-26 | End: 2021-02-18 | Stop reason: SDUPTHER

## 2020-08-26 RX ORDER — CEPHALEXIN 500 MG/1
500 CAPSULE ORAL 3 TIMES DAILY
Qty: 30 CAPSULE | Refills: 0 | Status: SHIPPED | OUTPATIENT
Start: 2020-08-26 | End: 2020-09-05

## 2020-08-26 ASSESSMENT — PATIENT HEALTH QUESTIONNAIRE - PHQ9
2. FEELING DOWN, DEPRESSED OR HOPELESS: 1
SUM OF ALL RESPONSES TO PHQ QUESTIONS 1-9: 1
SUM OF ALL RESPONSES TO PHQ9 QUESTIONS 1 & 2: 1
SUM OF ALL RESPONSES TO PHQ QUESTIONS 1-9: 1
1. LITTLE INTEREST OR PLEASURE IN DOING THINGS: 0

## 2020-08-26 ASSESSMENT — ENCOUNTER SYMPTOMS
VOMITING: 0
NAUSEA: 1

## 2020-08-26 NOTE — PROGRESS NOTES
[address] [address] [address]  Dept: [phone]  Dept KEILA:[phone]    Isai Ivy is a 69 y.o. female who presents today for her medical conditions/complaints as notedbelow. Isai Ivy is c/o of Dysuria (painful urination, urinary frequency, and chills for the past week)      HPI:     Dysuria    This is a new problem. The current episode started in the past 7 days (end of the week last week about 5 days). The problem occurs every urination. The problem has been gradually worsening. The quality of the pain is described as aching and burning. The pain is moderate. There has been no fever. She is not sexually active. There is no history of pyelonephritis. Associated symptoms include chills, frequency, nausea and urgency. Pertinent negatives include no discharge, flank pain, hematuria, hesitancy, possible pregnancy, sweats or vomiting. She has tried increased fluids for the symptoms. The treatment provided no relief. There is no history of recurrent UTIs. Has finally finished her weekly chemo. Will now be on Keytruda per Dr. Hiram Elise note but has not started anything. Overall feels pretty good. Tired and no appetite. Weight is holding stable at this time. Will have another blood test and another visit with Dr. Shukri Spear on Sept 14. Mood has been good sleep has been good. Her appetite is improving she is starting to gain a little bit of weight back. She is tolerating the S-Citalopram quite well at this time.     BP Readings from Last 3 Encounters:   08/26/20 128/82   11/18/19 124/76   11/04/19 122/74          (goal 120/80)    Wt Readings from Last 3 Encounters:   08/26/20 111 lb 11.2 oz (50.7 kg)   11/18/19 103 lb 11.2 oz (47 kg)   11/04/19 105 lb 9.6 oz (47.9 kg)        Past Medical History:   Diagnosis Date    Leukoplakia, gingiva     Lung cancer, upper lobe ([address] 2019    Osteopenia       Past Surgical History:   Procedure Laterality Date    BRONCHOSCOPY  05/02/2019    CATARACT REMOVAL Bilateral     COLONOSCOPY  2009    tubular adenoma with low grade dysplasia     TONSILLECTOMY AND ADENOIDECTOMY      TUBAL LIGATION         Family History   Problem Relation Age of Onset   Diana Pulliam Cancer Mother         multiple myeloma    Cancer Father         throat cancer    Thyroid Disease Sister     Thyroid Disease Sister     Thyroid Disease Daughter        Social History     Tobacco Use    Smoking status: Current Every Day Smoker     Packs/day: 0.50     Years: 53.00     Pack years: 26.50     Types: Cigarettes     Start date: 3/28/1966    Smokeless tobacco: Never Used    Tobacco comment: up to 1 ppd   Substance Use Topics    Alcohol use: Not on file      Current Outpatient Medications   Medication Sig Dispense Refill    escitalopram (LEXAPRO) 10 MG tablet Take 1 tablet by mouth daily 30 tablet 5    cephALEXin (KEFLEX) 500 MG capsule Take 1 capsule by mouth 3 times daily for 10 days 30 capsule 0    Probiotic Product (PROBIOTIC DAILY PO) Take by mouth      acetaminophen (TYLENOL) 500 MG tablet Take 500 mg by mouth every 6 hours as needed for Pain      fluticasone (GNP FLUTICASONE PROPIONATE) 50 MCG/ACT nasal spray 2 sprays by Nasal route daily 3 Bottle 3    ondansetron (ZOFRAN) 4 MG tablet Take 1 tablet by mouth daily as needed for Nausea or Vomiting 30 tablet 0    prochlorperazine (COMPAZINE) 10 MG tablet Take 1 tablet by mouth every 6 hours as needed  0    ibuprofen (ADVIL;MOTRIN) 200 MG tablet Take 200 mg by mouth every 6 hours as needed for Pain      Ascorbic Acid (VITAMIN C) 500 MG CAPS Vitamin C TABS  Refills: 0  Active      Calcium Carbonate-Vit D-Min (CALCIUM 1200 PO) Take by mouth      dicyclomine (BENTYL) 10 MG capsule Take 2 capsules by mouth 4 times daily as needed (diarrhea) TAKE 1 CAPSULE FOUR TIMES A DAY (Patient not taking: Reported on 8/26/2020) 120 capsule 2    lidocaine-prilocaine (EMLA) 2.5-2.5 % cream apply topically TO PORT SITE 60 TO 90  MINS PRIOR TO TREATMENT ADN COVER WITH PLASTIC WRAP  0     No current facility-administered medications for this visit. No Known Allergies    Health Maintenance   Topic Date Due    Shingles Vaccine (1 of 2) 10/01/1996    Breast cancer screen  11/21/2016    Annual Wellness Visit (AWV)  05/29/2019    Flu vaccine (1) 09/01/2020    DTaP/Tdap/Td vaccine (2 - Td) 03/11/2021    Lipid screen  04/01/2024    Colon cancer screen colonoscopy  12/23/2029    DEXA (modify frequency per FRAX score)  Completed    Pneumococcal 65+ years Vaccine  Completed    Hepatitis C screen  Completed    Hepatitis A vaccine  Aged Out    Hepatitis B vaccine  Aged Out    Hib vaccine  Aged Out    Meningococcal (ACWY) vaccine  Aged Out       Subjective:      Review of Systems   Constitutional: Positive for chills. Gastrointestinal: Positive for nausea. Negative for vomiting. Genitourinary: Positive for dysuria, frequency and urgency. Negative for flank pain, hematuria and hesitancy. Objective:     /82 (Site: Left Upper Arm, Position: Sitting, Cuff Size: Medium Adult)   Pulse 92   Wt 111 lb 11.2 oz (50.7 kg)   SpO2 98%   BMI 20.04 kg/m²     Physical Exam  Vitals signs and nursing note reviewed. Constitutional:       General: She is not in acute distress. Appearance: Normal appearance. She is well-developed. She is not ill-appearing. Comments: Alert and oriented but does appear slightly fatigued, thin but overall comfortable   HENT:      Head: Normocephalic. Right Ear: External ear normal.      Left Ear: External ear normal.   Eyes:      General: No scleral icterus. Conjunctiva/sclera: Conjunctivae normal.      Pupils: Pupils are equal, round, and reactive to light. Neck:      Musculoskeletal: Normal range of motion and neck supple. Thyroid: No thyromegaly. Cardiovascular:      Rate and Rhythm: Normal rate and regular rhythm. Pulses: Normal pulses.       Heart sounds: Normal heart sounds. No murmur. Pulmonary:      Effort: Pulmonary effort is normal.      Breath sounds: Normal breath sounds. Abdominal:      General: Bowel sounds are normal. There is no distension. Palpations: Abdomen is soft. Tenderness: There is no abdominal tenderness. There is no right CVA tenderness or left CVA tenderness. Musculoskeletal:         General: No deformity. Lymphadenopathy:      Cervical: Cervical adenopathy (Still small lymph node on the left upper anterior cervical top triangle but stable) present. Skin:     Capillary Refill: Capillary refill takes less than 2 seconds. Findings: No erythema. Neurological:      General: No focal deficit present. Mental Status: She is alert and oriented to person, place, and time. Psychiatric:         Mood and Affect: Mood normal.         Behavior: Behavior normal.         Thought Content: Thought content normal.         Judgment: Judgment normal.         Assessment/Plan:      Diagnosis Orders   1. Acute cystitis without hematuria     2. Dysuria  POCT Urinalysis no Micro    Culture, Urine   3. Anxiety  escitalopram (LEXAPRO) 10 MG tablet   4. Squamous cell carcinoma of lung, stage III, right ([address]     5. Simple chronic bronchitis ([address]       Will go ahead and treat for a UTI. We will start the cephalexin. Send urine for culture. Continue to push the fluids any fever chills or other signs of disease disseminated infection she is to call immediately. Continue on the escitalopram as this is working well at this point for her anxiety. Continue to try to increase her oral intake to maintain her current weight. If not gained a few pounds. Breathing has been stable no changes at this time. Continue to follow-up with Dr. Hernando Forbes for her chemotherapy.     Lab Results   Component Value Date    WBC 6.8 08/12/2020    HGB 9.3 (L) 08/12/2020    HCT 29.6 (L) 08/12/2020    .4 08/12/2020    CHOL 152 04/01/2019    TRIG 49 04/01/2019    ALT 44 (H) 08/12/2020    AST 30 08/12/2020     (L) 08/12/2020    K 4.2 08/12/2020     08/12/2020    CREATININE 0.8 08/12/2020    BUN 13 08/12/2020    CO2 25 08/12/2020    TSH 1.47 09/17/2019    INR 1.0 05/13/2019       Return if symptoms worsen or fail to improve. Patient given educational materials - see patientinstructions. Discussed use, benefit, and side effects of prescribed medications. All patient questions answered. Pt voiced understanding. Reviewed health maintenance. Instructed to continue current medications, diet andexercise. Patient agreed with treatment plan. Follow up as directed.      (Please note that portions of this note were completed with a voice-recognition program. Efforts were made to edit the dictation but occasionally words are mis-transcribed.)    Electronically signed by Danilo Doshi MD on 8/29/2020

## 2020-08-28 LAB
CULTURE: ABNORMAL
Lab: ABNORMAL
SPECIMEN DESCRIPTION: ABNORMAL

## 2020-10-12 RX ORDER — FLUTICASONE PROPIONATE 50 MCG
2 SPRAY, SUSPENSION (ML) NASAL DAILY
Qty: 3 BOTTLE | Refills: 3 | Status: SHIPPED | OUTPATIENT
Start: 2020-10-12 | End: 2021-11-19

## 2020-10-12 NOTE — TELEPHONE ENCOUNTER
Gayathri Saez is requesting a refill on the following medication(s):  Requested Prescriptions     Pending Prescriptions Disp Refills    fluticasone (GNP FLUTICASONE PROPIONATE) 50 MCG/ACT nasal spray 3 Bottle 3     Si sprays by Nasal route daily       Last Visit Date (If Applicable):      Next Visit Date:    Visit date not found

## 2021-02-18 DIAGNOSIS — F41.9 ANXIETY: ICD-10-CM

## 2021-02-18 RX ORDER — ESCITALOPRAM OXALATE 10 MG/1
10 TABLET ORAL DAILY
Qty: 30 TABLET | Refills: 5 | Status: SHIPPED | OUTPATIENT
Start: 2021-02-18 | End: 2021-09-02 | Stop reason: SDUPTHER

## 2021-03-24 LAB
AVERAGE GLUCOSE: NORMAL
CHOLESTEROL, TOTAL: 214 MG/DL
CHOLESTEROL/HDL RATIO: 2.1
HBA1C MFR BLD: 5.8 %
HDLC SERPL-MCNC: 104 MG/DL (ref 35–70)
LDL CHOLESTEROL CALCULATED: 100 MG/DL (ref 0–160)
NONHDLC SERPL-MCNC: ABNORMAL MG/DL
T4 FREE: 0.9
TRIGL SERPL-MCNC: 52 MG/DL
VLDLC SERPL CALC-MCNC: 10 MG/DL

## 2021-03-29 ENCOUNTER — TELEPHONE (OUTPATIENT)
Dept: FAMILY MEDICINE CLINIC | Age: 76
End: 2021-03-29

## 2021-03-29 ENCOUNTER — OFFICE VISIT (OUTPATIENT)
Dept: FAMILY MEDICINE CLINIC | Age: 76
End: 2021-03-29
Payer: MEDICARE

## 2021-03-29 VITALS
HEIGHT: 63 IN | DIASTOLIC BLOOD PRESSURE: 68 MMHG | BODY MASS INDEX: 19.54 KG/M2 | SYSTOLIC BLOOD PRESSURE: 120 MMHG | HEART RATE: 94 BPM | WEIGHT: 110.3 LBS | OXYGEN SATURATION: 99 %

## 2021-03-29 DIAGNOSIS — R63.0 DECREASED APPETITE: ICD-10-CM

## 2021-03-29 DIAGNOSIS — G45.9 TIA (TRANSIENT ISCHEMIC ATTACK): Primary | ICD-10-CM

## 2021-03-29 DIAGNOSIS — C34.91 SQUAMOUS CELL CARCINOMA OF LUNG, STAGE III, RIGHT (HCC): ICD-10-CM

## 2021-03-29 DIAGNOSIS — N30.00 ACUTE CYSTITIS WITHOUT HEMATURIA: ICD-10-CM

## 2021-03-29 DIAGNOSIS — J41.0 SIMPLE CHRONIC BRONCHITIS (HCC): ICD-10-CM

## 2021-03-29 DIAGNOSIS — F41.9 ANXIETY: ICD-10-CM

## 2021-03-29 DIAGNOSIS — E03.9 ACQUIRED HYPOTHYROIDISM: ICD-10-CM

## 2021-03-29 PROCEDURE — 99213 OFFICE O/P EST LOW 20 MIN: CPT | Performed by: FAMILY MEDICINE

## 2021-03-29 PROCEDURE — 99496 TRANSJ CARE MGMT HIGH F2F 7D: CPT | Performed by: FAMILY MEDICINE

## 2021-03-29 PROCEDURE — 1111F DSCHRG MED/CURRENT MED MERGE: CPT | Performed by: FAMILY MEDICINE

## 2021-03-29 PROCEDURE — 99497 ADVNCD CARE PLAN 30 MIN: CPT | Performed by: FAMILY MEDICINE

## 2021-03-29 RX ORDER — PREDNISONE 20 MG/1
10 TABLET ORAL EVERY OTHER DAY
COMMUNITY
Start: 2021-03-17 | End: 2021-11-18

## 2021-03-29 RX ORDER — AMOXICILLIN 875 MG/1
875 TABLET, COATED ORAL 2 TIMES DAILY
Qty: 14 TABLET | Refills: 0 | Status: SHIPPED | OUTPATIENT
Start: 2021-03-29 | End: 2021-04-05

## 2021-03-29 RX ORDER — CEPHALEXIN 500 MG/1
500 CAPSULE ORAL 3 TIMES DAILY
COMMUNITY
Start: 2021-03-26 | End: 2021-04-02

## 2021-03-29 RX ORDER — ATORVASTATIN CALCIUM 40 MG/1
40 TABLET, FILM COATED ORAL DAILY
COMMUNITY
Start: 2021-03-26 | End: 2021-08-05 | Stop reason: SDUPTHER

## 2021-03-29 RX ORDER — ASPIRIN 325 MG
325 TABLET ORAL DAILY
COMMUNITY

## 2021-03-29 RX ORDER — LEVOTHYROXINE SODIUM 0.03 MG/1
25 TABLET ORAL DAILY
COMMUNITY
Start: 2021-03-27 | End: 2022-04-07 | Stop reason: SDUPTHER

## 2021-03-29 ASSESSMENT — PATIENT HEALTH QUESTIONNAIRE - PHQ9
1. LITTLE INTEREST OR PLEASURE IN DOING THINGS: 0
2. FEELING DOWN, DEPRESSED OR HOPELESS: 0
SUM OF ALL RESPONSES TO PHQ QUESTIONS 1-9: 0

## 2021-03-29 NOTE — TELEPHONE ENCOUNTER
Jennifer [facility] Transitions Initial Follow Up Call    Outreach made within 2 business days of discharge: Yes    Patient: Zena Grey Patient : 1945   MRN: [id number]  Reason for Admission: TIA/ Altered mental status   Discharge Date:  3/26/21 Spoke with: patient    Discharge department/facility: [address] [place] [address] Interactive Patient Contact:  Was patient able to fill all prescriptions: Yes  Was patient instructed to bring all medications to the follow-up visit: Yes  Is patient taking all medications as directed in the discharge summary?  Yes  Does patient understand their discharge instructions: Yes  Does patient have questions or concerns that need addressed prior to 7-14 day follow up office visit: no    Scheduled appointment with PCP within 7-14 days    Follow Up  3/29/21 at 9am with Dr Dana Garrett LPN

## 2021-03-29 NOTE — PATIENT INSTRUCTIONS
Stay off of the prednisone until you talk to Dr. Eveline Farris later today (appears using this for the appetite stimulation- successfully)    Space the the cephalexin as well as you can to get the 3 times daily dosing. (D/C'ed after visit when culture results came in to the office)    If resuming the prednisone then would absolutely would start on something like famotidine 40 mg daily (at bedtime) to protect from stomach acid.     Restart the levothyroxine and recheck these labs in June 2021 with the other lab draw at oncology

## 2021-06-16 ENCOUNTER — OFFICE VISIT (OUTPATIENT)
Dept: FAMILY MEDICINE CLINIC | Age: 76
End: 2021-06-16
Payer: MEDICARE

## 2021-06-16 ENCOUNTER — TELEPHONE (OUTPATIENT)
Dept: FAMILY MEDICINE CLINIC | Age: 76
End: 2021-06-16

## 2021-06-16 VITALS
DIASTOLIC BLOOD PRESSURE: 84 MMHG | SYSTOLIC BLOOD PRESSURE: 184 MMHG | BODY MASS INDEX: 22.41 KG/M2 | OXYGEN SATURATION: 96 % | HEART RATE: 87 BPM | WEIGHT: 124.9 LBS

## 2021-06-16 DIAGNOSIS — R06.09 DOE (DYSPNEA ON EXERTION): ICD-10-CM

## 2021-06-16 DIAGNOSIS — R14.0 DISTENDED ABDOMEN: ICD-10-CM

## 2021-06-16 DIAGNOSIS — I10 ESSENTIAL HYPERTENSION: Primary | ICD-10-CM

## 2021-06-16 DIAGNOSIS — E03.9 ACQUIRED HYPOTHYROIDISM: ICD-10-CM

## 2021-06-16 DIAGNOSIS — R06.02 SOB (SHORTNESS OF BREATH) ON EXERTION: ICD-10-CM

## 2021-06-16 DIAGNOSIS — R19.8 INCREASED ABDOMINAL GIRTH: ICD-10-CM

## 2021-06-16 DIAGNOSIS — J41.0 SIMPLE CHRONIC BRONCHITIS (HCC): ICD-10-CM

## 2021-06-16 DIAGNOSIS — C34.91 SQUAMOUS CELL CARCINOMA OF LUNG, STAGE III, RIGHT (HCC): ICD-10-CM

## 2021-06-16 DIAGNOSIS — F41.9 ANXIETY: ICD-10-CM

## 2021-06-16 LAB — B-TYPE NATRIURETIC PEPTIDE: 124 PG/ML (ref 0–100)

## 2021-06-16 PROCEDURE — 93005 ELECTROCARDIOGRAM TRACING: CPT | Performed by: FAMILY MEDICINE

## 2021-06-16 PROCEDURE — 93010 ELECTROCARDIOGRAM REPORT: CPT | Performed by: FAMILY MEDICINE

## 2021-06-16 PROCEDURE — 99213 OFFICE O/P EST LOW 20 MIN: CPT

## 2021-06-16 PROCEDURE — 99214 OFFICE O/P EST MOD 30 MIN: CPT | Performed by: FAMILY MEDICINE

## 2021-06-16 RX ORDER — LOSARTAN POTASSIUM AND HYDROCHLOROTHIAZIDE 12.5; 5 MG/1; MG/1
1 TABLET ORAL DAILY
Qty: 30 TABLET | Refills: 5 | Status: SHIPPED | OUTPATIENT
Start: 2021-06-16 | End: 2021-08-05

## 2021-06-16 SDOH — ECONOMIC STABILITY: TRANSPORTATION INSECURITY
IN THE PAST 12 MONTHS, HAS LACK OF TRANSPORTATION KEPT YOU FROM MEETINGS, WORK, OR FROM GETTING THINGS NEEDED FOR DAILY LIVING?: NO

## 2021-06-16 SDOH — ECONOMIC STABILITY: FOOD INSECURITY: WITHIN THE PAST 12 MONTHS, THE FOOD YOU BOUGHT JUST DIDN'T LAST AND YOU DIDN'T HAVE MONEY TO GET MORE.: NEVER TRUE

## 2021-06-16 SDOH — ECONOMIC STABILITY: FOOD INSECURITY: WITHIN THE PAST 12 MONTHS, YOU WORRIED THAT YOUR FOOD WOULD RUN OUT BEFORE YOU GOT MONEY TO BUY MORE.: NEVER TRUE

## 2021-06-16 SDOH — ECONOMIC STABILITY: TRANSPORTATION INSECURITY
IN THE PAST 12 MONTHS, HAS THE LACK OF TRANSPORTATION KEPT YOU FROM MEDICAL APPOINTMENTS OR FROM GETTING MEDICATIONS?: NO

## 2021-06-16 ASSESSMENT — PATIENT HEALTH QUESTIONNAIRE - PHQ9
SUM OF ALL RESPONSES TO PHQ9 QUESTIONS 1 & 2: 0
SUM OF ALL RESPONSES TO PHQ QUESTIONS 1-9: 0
SUM OF ALL RESPONSES TO PHQ QUESTIONS 1-9: 0
1. LITTLE INTEREST OR PLEASURE IN DOING THINGS: 0
SUM OF ALL RESPONSES TO PHQ QUESTIONS 1-9: 0
2. FEELING DOWN, DEPRESSED OR HOPELESS: 0

## 2021-06-16 ASSESSMENT — SOCIAL DETERMINANTS OF HEALTH (SDOH): HOW HARD IS IT FOR YOU TO PAY FOR THE VERY BASICS LIKE FOOD, HOUSING, MEDICAL CARE, AND HEATING?: NOT HARD AT ALL

## 2021-06-16 NOTE — PROGRESS NOTES
[address] [facility]  [address]  Dept: [phone]  Dept NOF:[phone]    Rodriguez Manjarrez is a 77 y.o. female who presents today for her medical conditions/complaints as notedbelow. Rodriguez Manjarrez is c/o of Hypertension (blood pressure has been running high- she feels like she is retaining fluid but has not noticed much swelling in her legs/ feet. Once in awhile her lower legs will swell. )      HPI:     DENYS Mason has been treated with Dr. Nichole Marquez for her recurrent squamous cell cancer of the lung. She had been on maintenance Keytruda immunotherapy but unfortunately has had some progression in spite of this. She has been on prednisone for symptom relief and had been doing well with this but started to retain fluid with this. He did switch her to alternating days with the prednisone 10 mg the other day to try to help. She had lower extremity edema when she saw him in late May and he did do a Doppler of the lower extremities which was negative for clot on May 24. Labs 6/2/21 show TSH normal, hemoglobin 10.2 with a normal white count normal platelets and CMP normal.    Has been eating a lot better with the prednisone. Is fatigued all the time but not SOB, no palpitations. Bowels are moving OK at this time. Has felt like her belly is more distended than it had been. Does have the CT of the chest ordered for Friday with contrast as well. Abd was declined and required a peer to peer-- Dr. Nichole Marquez was to work on this yesterday. No belly pain. No blood in her stools. No nausea or vomiting. Pants are getting tighter even though nothing else seems to be getting bigger. Has SOB with exertion-- will get better with a minute or two of rest/  This has been getting worse. No orthopnea, no palpitations. No CP. Echo done at [facility]. in March 2021 was normal per the family-- do not have that record.       (Echo from  [facility] report 3/2021)    BP Readings from Last 3 Encounters:   21 (!) 184/84   21 120/68   20 128/82          (goal 120/80)    Wt Readings from Last 3 Encounters:   21 124 lb 14.4 oz (56.7 kg)   21 110 lb 4.8 oz (50 kg)   20 111 lb 11.2 oz (50.7 kg)        Past Medical History:   Diagnosis Date    Leukoplakia, gingiva     Lung cancer, upper lobe ([address] 2019    Osteopenia       Past Surgical History:   Procedure Laterality Date    BRONCHOSCOPY  2019    CATARACT REMOVAL Bilateral     COLONOSCOPY  2009    tubular adenoma with low grade dysplasia     TONSILLECTOMY AND ADENOIDECTOMY      TUBAL LIGATION         Family History   Problem Relation Age of Onset    Cancer Mother         multiple myeloma    Cancer Father         throat cancer    Thyroid Disease Sister     Thyroid Disease Sister     Thyroid Disease Daughter        Social History     Tobacco Use    Smoking status: Former Smoker     Packs/day: 0.50     Years: 53.00     Pack years: 26.50     Types: Cigarettes     Quit date: 2019     Years since quittin.2    Smokeless tobacco: Never Used    Tobacco comment: up to 1 ppd   Substance Use Topics    Alcohol use: Not on file      Current Outpatient Medications   Medication Sig Dispense Refill    losartan-hydroCHLOROthiazide (HYZAAR) 50-12.5 MG per tablet Take 1 tablet by mouth daily 30 tablet 5    predniSONE (DELTASONE) 20 MG tablet Take 10 mg by mouth every other day       levothyroxine (SYNTHROID) 25 MCG tablet Take 25 mcg by mouth daily      atorvastatin (LIPITOR) 40 MG tablet Take 40 mg by mouth daily      aspirin 325 MG tablet Take 325 mg by mouth daily      escitalopram (LEXAPRO) 10 MG tablet Take 1 tablet by mouth daily 30 tablet 5    fluticasone (GNP FLUTICASONE PROPIONATE) 50 MCG/ACT nasal spray 2 sprays by Nasal route daily 3 Bottle 3    lidocaine-prilocaine (EMLA) 2.5-2.5 % cream apply topically TO PORT SITE 60 TO 90  MINS PRIOR TO TREATMENT ADN COVER scleral icterus. Conjunctiva/sclera: Conjunctivae normal.      Pupils: Pupils are equal, round, and reactive to light. Neck:      Thyroid: No thyromegaly. Cardiovascular:      Rate and Rhythm: Normal rate and regular rhythm. Pulses: Normal pulses. Heart sounds: Normal heart sounds. No murmur heard. Pulmonary:      Effort: Pulmonary effort is normal.      Breath sounds: Normal breath sounds. Abdominal:      General: Bowel sounds are normal. There is distension (soft and nontender). Palpations: Abdomen is soft. There is no mass. Tenderness: There is no abdominal tenderness. There is no right CVA tenderness or left CVA tenderness. Musculoskeletal:         General: No deformity. Cervical back: Normal range of motion and neck supple. No muscular tenderness. Right lower leg: Edema present. Left lower leg: Edema present. Comments: Trace to 1+ LE edema   Lymphadenopathy:      Cervical: Cervical adenopathy present. Skin:     Capillary Refill: Capillary refill takes less than 2 seconds. Findings: No erythema. Neurological:      General: No focal deficit present. Mental Status: She is alert and oriented to person, place, and time. Cranial Nerves: No cranial nerve deficit. Sensory: No sensory deficit. Coordination: Coordination normal.   Psychiatric:         Mood and Affect: Mood normal.         Behavior: Behavior normal.         Thought Content: Thought content normal.         Judgment: Judgment normal.         Assessment/Plan:     1. Essential hypertension  -     losartan-hydroCHLOROthiazide (HYZAAR) 50-12.5 MG per tablet; Take 1 tablet by mouth daily, Disp-30 tablet, R-5Normal  2. Distended abdomen  3. Simple chronic bronchitis ([address]  4. Squamous cell carcinoma of lung, stage III, right ([address]  5. Acquired hypothyroidism  6. Anxiety  7. MERCADO (dyspnea on exertion)  8. Increased abdominal girth  9.  SOB (shortness of breath) on exertion  - Brain Natriuretic Peptide; Future  -     EKG 12 Lead      Will have labs in 1 week with Oncology to check the renal functions and the potassium. Hopefully the blood pressure will improve and this will help the third spacing. Hopefully this will help with some of her dyspnea on exertion as well. Would follow along with hematology oncology but if her abdominal bloating symptoms continue would certainly push forward with the peer to peer if needed for the abdominal pelvis CT    Her anxiety is well controlled at this time as is her hypothyroidism no changes in her current medications. Will want TSH and T4 reordered in the next several weeks as well as checking the lites BUN and creatinine with the addition of the current medications. Lab Results   Component Value Date    WBC 9.2 06/02/2021    HGB 10.2 (L) 06/02/2021    HCT 33.0 (L) 06/02/2021    .6 06/02/2021    CHOL 214 03/24/2021    TRIG 52 03/24/2021     (A) 03/24/2021    ALT 23 06/02/2021    AST 32 06/02/2021     06/02/2021    K 4.1 06/02/2021     06/02/2021    CREATININE 0.8 06/02/2021    BUN 22 (H) 06/02/2021    CO2 28 06/02/2021    TSH 3.31 03/03/2021    INR 0.9 03/24/2021    LABA1C 5.8 03/24/2021       Return in about 2 months (around 8/16/2021) for Medication recheck. Patient given educational materials - see patientinstructions. Discussed use, benefit, and side effects of prescribed medications. All patient questions answered. Pt voiced understanding. Reviewed health maintenance. Instructed to continue current medications, diet andexercise. Patient agreed with treatment plan. Follow up as directed.      (Please note that portions of this note were completed with a voice-recognition program. Efforts were made to edit the dictation but occasionally words are mis-transcribed.)    Electronically signed by Quyen Fong MD on 6/20/2021

## 2021-06-16 NOTE — TELEPHONE ENCOUNTER
[facility] called stating Josie Guillaume did the peer to peer for the CT of her Abd and it was denied, wondering if  would want to do the peer to peer herself and if so they need a new order also.

## 2021-06-17 NOTE — TELEPHONE ENCOUNTER
Spoke with oncology - Dr Nichole Marquez did NOT do the peer to peer. He was ok with just doing the chest CT and said that he did not need the CT ABD. If you want the CT abdomen for another reason or would still like the patient to have it then we need to order it and may need to do a peer to peer to get it covered.

## 2021-06-17 NOTE — TELEPHONE ENCOUNTER
At this point if it was already denied and Dr. Madison Bray did not feel it was necessary to do the peer to peer for the abdomen we will let it go and proceed after her CT of the lung.

## 2021-07-09 ENCOUNTER — OFFICE VISIT (OUTPATIENT)
Dept: FAMILY MEDICINE CLINIC | Age: 76
End: 2021-07-09
Payer: MEDICARE

## 2021-07-09 VITALS
WEIGHT: 124 LBS | HEART RATE: 94 BPM | HEIGHT: 62 IN | SYSTOLIC BLOOD PRESSURE: 120 MMHG | DIASTOLIC BLOOD PRESSURE: 80 MMHG | OXYGEN SATURATION: 96 % | RESPIRATION RATE: 20 BRPM | BODY MASS INDEX: 22.82 KG/M2

## 2021-07-09 DIAGNOSIS — I10 ESSENTIAL HYPERTENSION: Primary | ICD-10-CM

## 2021-07-09 DIAGNOSIS — J30.9 CHRONIC ALLERGIC RHINITIS: ICD-10-CM

## 2021-07-09 DIAGNOSIS — C34.91 SQUAMOUS CELL CARCINOMA OF LUNG, STAGE III, RIGHT (HCC): ICD-10-CM

## 2021-07-09 DIAGNOSIS — H91.92 DECREASED HEARING OF LEFT EAR: ICD-10-CM

## 2021-07-09 DIAGNOSIS — E03.9 ACQUIRED HYPOTHYROIDISM: ICD-10-CM

## 2021-07-09 PROCEDURE — 99214 OFFICE O/P EST MOD 30 MIN: CPT | Performed by: FAMILY MEDICINE

## 2021-07-09 NOTE — PROGRESS NOTES
[address] [facility]  [address]  Dept: [phone]  Dept YLX:[phone]    Aparna Kuo is a 77 y.o. female who presents today for her medical conditions/complaints as notedbelow. Aparna Kuo is c/o of Medication Check (3 month f/u)      HPI:     HPI    Last month Calista Lewis was started on losartan and hydrochlorothiazide for her hypertension. Blood pressures at that time were 184/84. She has been tolerating this well. Blood pressure reading today is significantly improved. BUN and creatinine were checked approximately 1 week after starting the medication and were normal at 19 and 0.9. Potassium was also stable at 4.5. BNP was 124 which is reasonable for her age. EKG was unremarkable. Does not feel like it helped with the fluid movement. The stomach still feels really bloated and full. Bowels are moving pretty good. Sometimes they are firm. Left ear feels plugged-- no pain. Has been using her Flonase on a regular basis up until about the last week or so. Just feels like the left side of her nose is also a little bit more plugged. No other URI type symptoms.     BP Readings from Last 3 Encounters:   07/09/21 120/80   06/16/21 (!) 184/84   03/29/21 120/68          (goal 120/80)    Wt Readings from Last 3 Encounters:   07/09/21 124 lb (56.2 kg)   06/16/21 124 lb 14.4 oz (56.7 kg)   03/29/21 110 lb 4.8 oz (50 kg)        Past Medical History:   Diagnosis Date    Leukoplakia, gingiva     Lung cancer, upper lobe ([address] 2019    Osteopenia       Past Surgical History:   Procedure Laterality Date    BRONCHOSCOPY  05/02/2019    CATARACT REMOVAL Bilateral     COLONOSCOPY  2009    tubular adenoma with low grade dysplasia     TONSILLECTOMY AND ADENOIDECTOMY      TUBAL LIGATION         Family History   Problem Relation Age of Onset    Cancer Mother         multiple myeloma    Cancer Father         throat cancer    Thyroid Disease Sister     Thyroid Disease Sister     Thyroid Disease Daughter        Social History     Tobacco Use    Smoking status: Former Smoker     Packs/day: 0.50     Years: 53.00     Pack years: 26.50     Types: Cigarettes     Quit date: 2019     Years since quittin.2    Smokeless tobacco: Never Used    Tobacco comment: up to 1 ppd   Substance Use Topics    Alcohol use: Not on file      Current Outpatient Medications   Medication Sig Dispense Refill    losartan-hydroCHLOROthiazide (HYZAAR) 50-12.5 MG per tablet Take 1 tablet by mouth daily 30 tablet 5    predniSONE (DELTASONE) 20 MG tablet Take 10 mg by mouth every other day       levothyroxine (SYNTHROID) 25 MCG tablet Take 25 mcg by mouth daily      atorvastatin (LIPITOR) 40 MG tablet Take 40 mg by mouth daily      aspirin 325 MG tablet Take 325 mg by mouth daily      escitalopram (LEXAPRO) 10 MG tablet Take 1 tablet by mouth daily 30 tablet 5    fluticasone (GNP FLUTICASONE PROPIONATE) 50 MCG/ACT nasal spray 2 sprays by Nasal route daily 3 Bottle 3    lidocaine-prilocaine (EMLA) 2.5-2.5 % cream apply topically TO PORT SITE 60 TO 90  MINS PRIOR TO TREATMENT ADN COVER WITH PLASTIC WRAP  0    Probiotic Product (PROBIOTIC DAILY PO) Take by mouth      acetaminophen (TYLENOL) 500 MG tablet Take 500 mg by mouth every 6 hours as needed for Pain      ibuprofen (ADVIL;MOTRIN) 200 MG tablet Take 200 mg by mouth every 6 hours as needed for Pain      Ascorbic Acid (VITAMIN C) 500 MG CAPS Vitamin C TABS  Refills: 0  Active      Calcium Carbonate-Vit D-Min (CALCIUM 1200 PO) Take by mouth       No current facility-administered medications for this visit.      No Known Allergies    Health Maintenance   Topic Date Due    Shingles Vaccine (1 of 2) Never done    Breast cancer screen  2016    Annual Wellness Visit (AWV)  Never done    DTaP/Tdap/Td vaccine (2 - Td or Tdap) 2021    Flu vaccine (1) 2021    A1C test (Diabetic or Prediabetic) 03/24/2022    Lipid screen  03/24/2022    Low dose CT lung screening  06/18/2022    TSH testing  06/23/2022    Potassium monitoring  06/23/2022    Creatinine monitoring  06/23/2022    Colon cancer screen colonoscopy  12/23/2029    DEXA (modify frequency per FRAX score)  Completed    Pneumococcal 65+ years Vaccine  Completed    COVID-19 Vaccine  Completed    Hepatitis C screen  Completed    Hepatitis A vaccine  Aged Out    Hepatitis B vaccine  Aged Out    Hib vaccine  Aged Out    Meningococcal (ACWY) vaccine  Aged Out       Subjective:      Review of Systems    Objective:     /80 (Site: Left Upper Arm, Position: Sitting, Cuff Size: Large Adult)   Pulse 94   Resp 20   Ht 5' 2\" (1.575 m)   Wt 124 lb (56.2 kg)   SpO2 96%   BMI 22.68 kg/m²     Physical Exam  Vitals and nursing note reviewed. Constitutional:       General: She is not in acute distress. Appearance: Normal appearance. She is well-developed. She is not ill-appearing. Comments: Alert and oriented    HENT:      Head: Normocephalic. Right Ear: Tympanic membrane and external ear normal.      Left Ear: Tympanic membrane, ear canal and external ear normal.      Ears:      Comments: Moderate cerumen on the right canal only     Nose: Congestion (More on the left than the right boggy turbinates) present. No rhinorrhea. Mouth/Throat:      Mouth: Mucous membranes are moist.   Eyes:      General: No scleral icterus. Conjunctiva/sclera: Conjunctivae normal.      Pupils: Pupils are equal, round, and reactive to light. Neck:      Thyroid: No thyromegaly. Cardiovascular:      Rate and Rhythm: Normal rate and regular rhythm. Pulses: Normal pulses. Heart sounds: Normal heart sounds. No murmur heard. Pulmonary:      Effort: Pulmonary effort is normal.      Breath sounds: Normal breath sounds. Abdominal:      General: Bowel sounds are normal. There is distension (soft and nontender).       Palpations: Abdomen is soft. There is no mass. Tenderness: There is no abdominal tenderness. There is no right CVA tenderness or left CVA tenderness. Musculoskeletal:         General: No deformity. Cervical back: Normal range of motion and neck supple. No muscular tenderness. Right lower leg: Edema present. Left lower leg: Edema present. Comments: Trace LE edema   Lymphadenopathy:      Cervical: Cervical adenopathy present. Skin:     Capillary Refill: Capillary refill takes less than 2 seconds. Findings: No erythema. Neurological:      General: No focal deficit present. Mental Status: She is alert and oriented to person, place, and time. Cranial Nerves: No cranial nerve deficit. Sensory: No sensory deficit. Coordination: Coordination normal.   Psychiatric:         Mood and Affect: Mood normal.         Behavior: Behavior normal.         Thought Content: Thought content normal.         Judgment: Judgment normal.         Assessment/Plan:     1. Essential hypertension  2. Squamous cell carcinoma of lung, stage III, right ([address]  3. Acquired hypothyroidism  4. Chronic allergic rhinitis  5. Decreased hearing of left ear      For the constipation and bowel irregularity had and Benefiber or similar fiber supplement on a regular basis. Start to slowly to prevent gas and bloating. Hopefully this will actually help with the overall bloating long-term. Clear supplement in light of her difficulty with liquid medications. Doing well on the current hypertensive medication continue this with the losartan HCTZ. Has an echocardiogram scheduled next week ordered by Dr. Mercedes Zavala and we will hopefully also get this result to review with her. Use the Flonase on a regular basis. Think about a referral for hearing evaluation for possible hearing aids if continues to have plugged or decreased hearing.     Lab Results   Component Value Date    WBC 11.0 (H) 06/23/2021    HGB 10.8 (L) 06/23/2021    HCT 34.9 (L) 06/23/2021    .7 06/23/2021    CHOL 214 03/24/2021    TRIG 52 03/24/2021     (A) 03/24/2021    ALT 21 06/23/2021    AST 35 06/23/2021     06/23/2021    K 4.5 06/23/2021     06/23/2021    CREATININE 0.9 06/23/2021    BUN 19 (H) 06/23/2021    CO2 26 06/23/2021    TSH 3.31 03/03/2021    INR 0.9 03/24/2021    LABA1C 5.8 03/24/2021       Return in about 3 months (around 10/9/2021). Patient given educational materials - see patientinstructions. Discussed use, benefit, and side effects of prescribed medications. All patient questions answered. Pt voiced understanding. Reviewed health maintenance. Instructed to continue current medications, diet andexercise. Patient agreed with treatment plan. Follow up as directed.      (Please note that portions of this note were completed with a voice-recognition program. Efforts were made to edit the dictation but occasionally words are mis-transcribed.)    Electronically signed by Danilo Mendoza MD on 7/9/2021

## 2021-07-17 LAB
ACANTHOCYTES: NORMAL
ANION GAP SERPL CALCULATED.3IONS-SCNC: 14.7 MMOL/L
BANDED NEUTROPHILS RELATIVE PERCENT: 3 % (ref 0–5)
BASOPHILS %. MANUAL COUNT: 0 % (ref 0–1)
BUN BLDV-MCNC: 26 MG/DL (ref 7–17)
CALCIUM SERPL-MCNC: 8.6 MG/DL (ref 8.4–10.2)
CHLORIDE BLD-SCNC: 106 MMOL/L (ref 98–120)
CO2: 20 MMOL/L (ref 22–31)
CREAT SERPL-MCNC: 1.1 MG/DL (ref 0.5–1)
CREATININE CLEARANCE: 37.12
EOSINOPHILS % MANUAL COUNT: 0 (ref 0–10)
GFR CALCULATED: 51.6
GLUCOSE: 169 MG/DL (ref 65–105)
HCT VFR BLD CALC: 26.5 % (ref 37–47)
HELMET CELLS: NORMAL
HEMOGLOBIN: 8.6 (ref 12–16)
HYPOCHROMIA: NORMAL
LYMPHOCYTES % MANUAL COUNT: 2 % (ref 21–51)
MCH RBC QN AUTO: 28 PG (ref 28.5–32.5)
MCHC RBC AUTO-ENTMCNC: 32.4 G/DL (ref 32–37)
MCV RBC AUTO: 86.2 FL (ref 80–94)
MICROCYTOSIS: NORMAL
MONOCYTES RELATIVE PERCENT: 3 % (ref 2–9)
NEUTROPHILS %. MANUAL COUNT: 92 % (ref 42–75)
PDW BLD-RTO: 13.9 % (ref 8.5–15.5)
PLATELET # BLD: 270.8 THOU/MM3 (ref 130–400)
POIKILOCYTES: NORMAL
POTASSIUM SERPL-SCNC: 4.7 MMOL/L (ref 3.6–5)
RBC: 3.07 M/UL (ref 4.2–5.4)
SODIUM BLD-SCNC: 136 MMOL/L (ref 135–145)
WBC: 12.5 THOU/ML3 (ref 4.8–10.8)

## 2021-07-18 LAB
ANION GAP SERPL CALCULATED.3IONS-SCNC: 9.5 MMOL/L
BASOPHILS %: 0.48 (ref 0–3)
BASOPHILS ABSOLUTE: 0.05 (ref 0–0.3)
BUN BLDV-MCNC: 30 MG/DL (ref 7–17)
CALCIUM SERPL-MCNC: 8.3 MG/DL (ref 8.4–10.2)
CHLORIDE BLD-SCNC: 104 MMOL/L (ref 98–120)
CO2: 23 MMOL/L (ref 22–31)
CREAT SERPL-MCNC: 1 MG/DL (ref 0.5–1)
CREATININE CLEARANCE: 40.83
EOSINOPHILS %: 4.06 (ref 0–10)
EOSINOPHILS ABSOLUTE: 0.43 (ref 0–1.1)
GFR CALCULATED: 57.6
GLUCOSE: 126 MG/DL (ref 65–105)
HCT VFR BLD CALC: 23.5 % (ref 37–47)
HCT VFR BLD CALC: 27.9 % (ref 37–47)
HEMOGLOBIN: 7.5 (ref 12–16)
HEMOGLOBIN: 9.1 (ref 12–16)
LYMPHOCYTE %: 6.55 (ref 20–51.1)
LYMPHOCYTES ABSOLUTE: 0.69 (ref 1–5.5)
MCH RBC QN AUTO: 27.6 PG (ref 28.5–32.5)
MCHC RBC AUTO-ENTMCNC: 32.1 G/DL (ref 32–37)
MCV RBC AUTO: 86.1 FL (ref 80–94)
MONOCYTES %: 11.33 (ref 1.7–9.3)
MONOCYTES ABSOLUTE: 1.19 (ref 0.1–1)
NEUTROPHILS %: 77.59 (ref 42.2–75.2)
NEUTROPHILS ABSOLUTE: 8.16 (ref 2–8.1)
PDW BLD-RTO: 14.2 % (ref 8.5–15.5)
PLATELET # BLD: 228.3 THOU/MM3 (ref 130–400)
POTASSIUM SERPL-SCNC: 4.5 MMOL/L (ref 3.6–5)
RBC: 2.73 M/UL (ref 4.2–5.4)
SODIUM BLD-SCNC: 132 MMOL/L (ref 135–145)
WBC: 10.5 THOU/ML3 (ref 4.8–10.8)

## 2021-07-19 LAB
ANION GAP SERPL CALCULATED.3IONS-SCNC: 13.5 MMOL/L
BUN BLDV-MCNC: 27 MG/DL (ref 7–17)
CALCIUM SERPL-MCNC: 8.7 MG/DL (ref 8.4–10.2)
CHLORIDE BLD-SCNC: 101 MMOL/L (ref 98–120)
CO2: 24 MMOL/L (ref 22–31)
CREAT SERPL-MCNC: 1 MG/DL (ref 0.5–1)
CREATININE CLEARANCE: 40.83
GFR CALCULATED: 57.6
GLUCOSE: 119 MG/DL (ref 65–105)
HCT VFR BLD CALC: 31.3 % (ref 37–47)
HEMOGLOBIN: 9.9 (ref 12–16)
POTASSIUM SERPL-SCNC: 4.5 MMOL/L (ref 3.6–5)
SODIUM BLD-SCNC: 134 MMOL/L (ref 135–145)

## 2021-08-02 ENCOUNTER — TELEPHONE (OUTPATIENT)
Dept: FAMILY MEDICINE CLINIC | Age: 76
End: 2021-08-02

## 2021-08-02 RX ORDER — TIZANIDINE 4 MG/1
TABLET ORAL
COMMUNITY
Start: 2021-07-29 | End: 2021-09-20 | Stop reason: SDUPTHER

## 2021-08-02 RX ORDER — HYDROCODONE BITARTRATE AND ACETAMINOPHEN 5; 325 MG/1; MG/1
TABLET ORAL
COMMUNITY
Start: 2021-07-16 | End: 2022-08-29

## 2021-08-05 ENCOUNTER — OFFICE VISIT (OUTPATIENT)
Dept: FAMILY MEDICINE CLINIC | Age: 76
End: 2021-08-05
Payer: MEDICARE

## 2021-08-05 VITALS
BODY MASS INDEX: 22.13 KG/M2 | SYSTOLIC BLOOD PRESSURE: 94 MMHG | HEART RATE: 84 BPM | OXYGEN SATURATION: 96 % | DIASTOLIC BLOOD PRESSURE: 44 MMHG | WEIGHT: 121 LBS

## 2021-08-05 DIAGNOSIS — Z96.642 HISTORY OF TOTAL HIP REPLACEMENT, LEFT: ICD-10-CM

## 2021-08-05 DIAGNOSIS — S72.002A CLOSED FRACTURE OF LEFT HIP, INITIAL ENCOUNTER (HCC): Primary | ICD-10-CM

## 2021-08-05 DIAGNOSIS — I10 ESSENTIAL HYPERTENSION: ICD-10-CM

## 2021-08-05 DIAGNOSIS — E03.9 ACQUIRED HYPOTHYROIDISM: ICD-10-CM

## 2021-08-05 DIAGNOSIS — H91.92 DECREASED HEARING OF LEFT EAR: ICD-10-CM

## 2021-08-05 DIAGNOSIS — D50.9 IRON DEFICIENCY ANEMIA, UNSPECIFIED IRON DEFICIENCY ANEMIA TYPE: ICD-10-CM

## 2021-08-05 LAB
ANION GAP SERPL CALCULATED.3IONS-SCNC: 14.9 MMOL/L
BASOPHILS %: 2.46 (ref 0–3)
BASOPHILS ABSOLUTE: 0.22 (ref 0–0.3)
BUN BLDV-MCNC: 16 MG/DL (ref 7–17)
CALCIUM SERPL-MCNC: 9.2 MG/DL (ref 8.4–10.2)
CHLORIDE BLD-SCNC: 98 MMOL/L (ref 98–120)
CO2: 25 MMOL/L (ref 22–31)
CREAT SERPL-MCNC: 0.7 MG/DL (ref 0.5–1)
EOSINOPHILS %: 5.24 (ref 0–10)
EOSINOPHILS ABSOLUTE: 0.46 (ref 0–1.1)
GFR CALCULATED: > 60
GLUCOSE: 103 MG/DL (ref 65–105)
HCT VFR BLD CALC: 30.4 % (ref 37–47)
HEMOGLOBIN: 10 (ref 12–16)
LYMPHOCYTE %: 16.92 (ref 20–51.1)
LYMPHOCYTES ABSOLUTE: 1.49 (ref 1–5.5)
MCH RBC QN AUTO: 28.3 PG (ref 28.5–32.5)
MCHC RBC AUTO-ENTMCNC: 33 G/DL (ref 32–37)
MCV RBC AUTO: 85.7 FL (ref 80–94)
MONOCYTES %: 12.07 (ref 1.7–9.3)
MONOCYTES ABSOLUTE: 1.06 (ref 0.1–1)
NEUTROPHILS %: 63.31 (ref 42.2–75.2)
NEUTROPHILS ABSOLUTE: 5.58 (ref 2–8.1)
PDW BLD-RTO: 14.5 % (ref 8.5–15.5)
PLATELET # BLD: 512.9 THOU/MM3 (ref 130–400)
POTASSIUM SERPL-SCNC: 4.9 MMOL/L (ref 3.6–5)
RBC: 3.55 M/UL (ref 4.2–5.4)
SODIUM BLD-SCNC: 133 MMOL/L (ref 135–145)
WBC: 8.8 THOU/ML3 (ref 4.8–10.8)

## 2021-08-05 PROCEDURE — 1111F DSCHRG MED/CURRENT MED MERGE: CPT | Performed by: FAMILY MEDICINE

## 2021-08-05 PROCEDURE — 99495 TRANSJ CARE MGMT MOD F2F 14D: CPT | Performed by: FAMILY MEDICINE

## 2021-08-05 PROCEDURE — 99213 OFFICE O/P EST LOW 20 MIN: CPT | Performed by: FAMILY MEDICINE

## 2021-08-05 RX ORDER — ATORVASTATIN CALCIUM 40 MG/1
40 TABLET, FILM COATED ORAL DAILY
Qty: 30 TABLET | Refills: 11 | Status: SHIPPED | OUTPATIENT
Start: 2021-08-05 | End: 2022-07-05 | Stop reason: SDUPTHER

## 2021-08-05 NOTE — PROGRESS NOTES
Post-Discharge Transitional Care Management Services or Hospital Follow Up      Amadou Garrett   YOB: 1945    Date of Office Visit:  8/5/2021  Date of Hospital Admission: 7/15-- 7/23 to swing bed  Date of Hospital Discharge: 7/29/2021    Care management risk score Rising risk (score 2-5) and Complex Care (Scores >=6): 1     Non face to face  following discharge, date last encounter closed (first attempt may have been earlier): 8/2/2021  9:54 AM     Call initiated 2 business days of discharge: Yes    Patient Active Problem List   Diagnosis    Leukoplakia, gingiva    Osteopenia    [facility]    Lung cancer, upper lobe ([facility]    Squamous cell carcinoma of lung, stage III, right ([facility] [address] [facility]    Acquired hypothyroidism    Anxiety    Chronic allergic rhinitis       No Known Allergies    Medications listed as ordered at the time of discharge from [address] [place] [address]     Medications marked \"taking\" at this time  Outpatient Medications Marked as Taking for the 8/5/21 encounter (Office Visit) with Bennie Katz MD   Medication Sig Dispense Refill    atorvastatin (LIPITOR) 40 MG tablet Take 1 tablet by mouth daily 30 tablet 11    HYDROcodone-acetaminophen (NORCO) 5-325 MG per tablet take 1 to 2 tablets by mouth every 4 to 6 hours AS NEEDED FOR PAIN      tiZANidine (ZANAFLEX) 4 MG tablet take 1 tablet by mouth every 6 hours if needed for muscle spasm      predniSONE (DELTASONE) 20 MG tablet Take 10 mg by mouth every other day       levothyroxine (SYNTHROID) 25 MCG tablet Take 25 mcg by mouth daily      aspirin 325 MG tablet Take 325 mg by mouth daily      escitalopram (LEXAPRO) 10 MG tablet Take 1 tablet by mouth daily 30 tablet 5    fluticasone (GNP FLUTICASONE PROPIONATE) 50 MCG/ACT nasal spray 2 sprays by Nasal route daily 3 Bottle 3    lidocaine-prilocaine (EMLA) 2.5-2.5 % cream apply topically TO PORT SITE 60 TO 90  MINS PRIOR TO TREATMENT ADN COVER WITH PLASTIC WRAP  0    Probiotic Product (PROBIOTIC DAILY PO) Take by mouth      acetaminophen (TYLENOL) 500 MG tablet Take 500 mg by mouth every 6 hours as needed for Pain      ibuprofen (ADVIL;MOTRIN) 200 MG tablet Take 200 mg by mouth every 6 hours as needed for Pain      Ascorbic Acid (VITAMIN C) 500 MG CAPS Vitamin C TABS  Refills: 0  Active      Calcium Carbonate-Vit D-Min (CALCIUM 1200 PO) Take by mouth          Medications patient taking as of now reconciled against medications ordered at time of hospital discharge: Yes    Chief Complaint   Patient presents with    Follow-Up from [address] [place] [address]/[facility] 7/30/21 - hip fracture. Feels like she is doing pretty good most of the time. Sometimes has pain at night and will have to take a pain pill before bed and once in awhile it will wake her in the middle of the night.  Hearing Problem     patient states that her hearing has been worse this past month. She did take the flonase regularily and it didn't help at all.  Discuss Medications     would like to discuss lipitor and if she needs to continue it       Benito montalvo had a mechanical fall at home and had a fracture of the left hip. She was made in [facility] where she underwent a left total hip arthroplasty. She had an unremarkable post operative course. She was diagnosed with urinary tract infection on her admission was treated with IV Rocephin and then switched to p.o. cephalexin while in-house. She was discharged to the swing bed on 7/23 and underwent further rehabilitation. She was then discharged home on 7/29. She has not been taking her blood pressure medication since her hospital discharge. There was a question of a cough while she was in the hospital and days swallowing study was performed. This showed no aspiration but they did recommend slightly thickened liquids with other interventions such as making sure she is sitting upright when she is eating and drinking.       Inpatient course: Discharge summary reviewed- see chart. Interval history/Current status: Mary Norris said has been doing much better. She continues to have very little pain. Her ambulation is continuing to improve gradually. Appetite is definitely improving as well. She has not had dizziness lightheadedness or any concerns with falls. Her biggest concern is the hearing. The ears are still bothering her -- cannot hear at all, feels very muffled. The left ear is the worst.  The right is not bad at all. Seems like her symptoms have only been a few months. Has been using the flonase regularly for over a month. No pain -- feels like it should pop but won't. Feels so plugged. Appetite is improved. Will sometimes eat well and other times not as much. Is not sleeping that well. Not really sure why she is not sleeping. She is not really having any pain is not having nausea or vomiting. Vitals:    08/05/21 1359   BP: (!) 94/44   Site: Left Upper Arm   Position: Sitting   Cuff Size: Medium Adult   Pulse: 84   SpO2: 96%   Weight: 121 lb (54.9 kg)     Body mass index is 22.13 kg/m². Wt Readings from Last 3 Encounters:   08/05/21 121 lb (54.9 kg)   07/09/21 124 lb (56.2 kg)   06/16/21 124 lb 14.4 oz (56.7 kg)     BP Readings from Last 3 Encounters:   08/05/21 (!) 94/44   07/09/21 120/80   06/16/21 (!) 184/84       Review of Systems    Physical Exam  Vitals and nursing note reviewed. Constitutional:       General: She is not in acute distress. Appearance: Normal appearance. She is well-developed. She is not ill-appearing. Comments: Alert and oriented    HENT:      Head: Normocephalic. Right Ear: Tympanic membrane, ear canal and external ear normal.      Left Ear: Ear canal and external ear normal.      Ears:      Comments: TMs unremarkable on the right, left has some retraction but no fluid noted     Nose: Congestion (More on the left than the right boggy turbinates) present. No rhinorrhea.       Mouth/Throat: replacement, left    - SD DISCHARGE MEDS RECONCILED W/ CURRENT OUTPATIENT MED LIST    3. Decreased hearing of left ear  Referral to audiology for evaluation. May need ENT referral if shows significant eustachian tube dysfunction or significant otosclerosis. - External Referral To Audiology    4. Acquired hypothyroidism  TSH and T4 will need follow-up later this fall but has been stable at this time. 5. Iron deficiency anemia, unspecified iron deficiency anemia type    - CBC Auto Differential; Future    6. Essential hypertension  At this point we will stay off of any blood pressure medications with the lower blood pressure readings. As the anemia improves we may need to start the losartan back up but we will hold again at this time. - Basic Metabolic Panel; Future         Possible ENT referral if the audiometry shows there is poor movement of the TM. Check the labs today. Suspect the anemia is responsible for the hypotension. Continue to push her fluids. Will check CBC and iron panel to ensure that this is improving. At this point we will not add additional iron pending these results.     Medical Decision Making: moderate complexity

## 2021-08-09 ENCOUNTER — TELEPHONE (OUTPATIENT)
Dept: FAMILY MEDICINE CLINIC | Age: 76
End: 2021-08-09

## 2021-08-09 NOTE — TELEPHONE ENCOUNTER
----- Message from Artie Ledy sent at 8/6/2021 12:29 PM EDT -----  Subject: Message to Provider    QUESTIONS  Information for Provider? [facility] -Dr. Herbert Mendez office need full   copy of order sent over with insurance information included . Fax #   [phone]   ---------------------------------------------------------------------------  --------------  Kwabena Villanueva INFO  What is the best way for the office to contact you? OK to leave message on   voicemail  Preferred Call Back Phone Number? [phone]  ---------------------------------------------------------------------------  --------------  SCRIPT ANSWERS  Relationship to Patient? Third Party  Representative Name?  [facility] Dr. Dorrie Schlatter

## 2021-08-16 ENCOUNTER — TELEPHONE (OUTPATIENT)
Dept: FAMILY MEDICINE CLINIC | Age: 76
End: 2021-08-16

## 2021-08-16 RX ORDER — MECLIZINE HCL 12.5 MG/1
12.5 TABLET ORAL 3 TIMES DAILY PRN
Qty: 30 TABLET | Refills: 0 | Status: SHIPPED | OUTPATIENT
Start: 2021-08-16 | End: 2021-08-26

## 2021-08-16 NOTE — TELEPHONE ENCOUNTER
Layo Seymour from [facility] called states patient is having a lot of dizziness today. It is also causing some nausea. She states she did have some dizziness at her last appointment but today is worse. She feels lightheaded when sitting and when she stands she feels like she could fall forward. If she gets up in the middle of the night she feels spinning and it is worse than during the day. She does have an appointment with audiology today. Nurse checked orthostatic bps and states there is not a drop in bp or increase in heartrate.

## 2021-08-16 NOTE — TELEPHONE ENCOUNTER
This sounds like vertigo- we can try some meclizine but then we should probably see and evaluate.   Script sent to pharmacy

## 2021-08-24 ENCOUNTER — OFFICE VISIT (OUTPATIENT)
Dept: OTOLARYNGOLOGY | Age: 76
End: 2021-08-24
Payer: MEDICARE

## 2021-08-24 VITALS
HEART RATE: 88 BPM | WEIGHT: 119.2 LBS | DIASTOLIC BLOOD PRESSURE: 68 MMHG | HEIGHT: 62 IN | BODY MASS INDEX: 21.94 KG/M2 | SYSTOLIC BLOOD PRESSURE: 134 MMHG | OXYGEN SATURATION: 97 %

## 2021-08-24 DIAGNOSIS — H90.6 MIXED CONDUCTIVE AND SENSORINEURAL HEARING LOSS OF BOTH EARS: ICD-10-CM

## 2021-08-24 DIAGNOSIS — H61.23 BILATERAL IMPACTED CERUMEN: Primary | ICD-10-CM

## 2021-08-24 DIAGNOSIS — H72.91 TYMPANIC MEMBRANE PERFORATION, RIGHT: ICD-10-CM

## 2021-08-24 PROCEDURE — 99204 OFFICE O/P NEW MOD 45 MIN: CPT | Performed by: OTOLARYNGOLOGY

## 2021-08-24 PROCEDURE — 99212 OFFICE O/P EST SF 10 MIN: CPT

## 2021-08-24 NOTE — PROGRESS NOTES
2021 2:53 PM EDT  Eulogio Rowe (:  1945) is a 77 y.o. female,New patient, here for evaluation of the following chief complaint(s):  Hearing Problem (has had hearing loss for a while in both ears. left ear feels plugged) and Dizziness (has dizzines at noc at laying for awhile)      ASSESSMENT/PLAN:  1. Bilateral impacted cerumen    2. Mixed conductive and sensorineural hearing loss of both ears    3. Tympanic membrane perforation, right      1. Bilateral impacted cerumen  2. Mixed conductive and sensorineural hearing loss of both ears  3. Tympanic membrane perforation, right    Continue h202 soaks bid x 7 days   Fu next week to repeat cleaning on the left ear  Repeat audio after cleaning   Discussed etd and possible otosclerosis/tympanosclerosis   No follow-ups on file. SUBJECTIVE/OBJECTIVE:  HPI  69yo woman with a plugged feeling in her left ear. Ears were recently flushed. Is wondering if the left ear has cerumen. Audio 21:   Moderate to severe mixed HL AU   Type As tymp AU   % AU     Past Medical History:   Diagnosis Date    Leukoplakia, gingiva     Lung cancer, upper lobe ([address] 2019    Osteopenia      Past Surgical History:   Procedure Laterality Date    BRONCHOSCOPY  2019    CATARACT REMOVAL Bilateral     COLONOSCOPY  2009    tubular adenoma with low grade dysplasia     TONSILLECTOMY AND ADENOIDECTOMY      TUBAL LIGATION       Social History     Tobacco History     Smoking Status  Former Smoker Quit date  2019 Smoking Frequency  0.5 packs/day for 48 years (26.5 pk yrs) Smoking Tobacco Type  Cigarettes    Smokeless Tobacco Use  Never Used    Tobacco Comment  up to 1 ppd          Alcohol History     Alcohol Use Status  Not Asked          Drug Use     Drug Use Status  Not Asked          Sexual Activity     Sexually Active  Not Asked              Family History   Problem Relation Age of Onset    Cancer Mother         multiple myeloma    Cancer Father         throat cancer    Thyroid Disease Sister     Thyroid Disease Sister     Thyroid Disease Daughter      Current Outpatient Medications   Medication Instructions    acetaminophen (TYLENOL) 500 mg, Oral, EVERY 6 HOURS PRN    Ascorbic Acid (VITAMIN C) 500 MG CAPS Vitamin C TABS  Refills: 0  Active    aspirin 325 mg, Oral, DAILY    atorvastatin (LIPITOR) 40 mg, Oral, DAILY    Calcium Carbonate-Vit D-Min (CALCIUM 1200 PO) Oral    escitalopram (LEXAPRO) 10 mg, Oral, DAILY    fluticasone (GNP FLUTICASONE PROPIONATE) 50 MCG/ACT nasal spray 2 sprays, Nasal, DAILY    HYDROcodone-acetaminophen (NORCO) 5-325 MG per tablet take 1 to 2 tablets by mouth every 4 to 6 hours AS NEEDED FOR PAIN    ibuprofen (ADVIL;MOTRIN) 200 mg, EVERY 6 HOURS PRN    levothyroxine (SYNTHROID) 25 mcg, Oral, DAILY    lidocaine-prilocaine (EMLA) 2.5-2.5 % cream apply topically TO PORT SITE 60 TO 90  MINS PRIOR TO TREATMENT ADN COVER WITH PLASTIC WRAP    meclizine (ANTIVERT) 12.5 mg, Oral, 3 TIMES DAILY PRN    predniSONE (DELTASONE) 10 mg, Oral, EVERY OTHER DAY    Probiotic Product (PROBIOTIC DAILY PO) Oral    tiZANidine (ZANAFLEX) 4 MG tablet take 1 tablet by mouth every 6 hours if needed for muscle spasm     No Known Allergies    ENT ROS: positive for - hearing changes     General: The patient is found to be alert and normally responsive female with grossly normal hearing, clear voice and normal articulation. Communication is without difficulty. Voice: Clear   Skin: The skin has normal colour and turgor. Face: The facial contour is symmetric at rest and with movement. Ears: The pinnae have normal contours.     AD: EAC with cerumen, removed with alligator, TM with perforation anterior/inferior with clean and dry edges   Otomicroscope: thickened TM, shallow retraction pocket in pars flaccida, tm perforation with clean edges   AS: EAC clear, TM intact, thick and dry plaque of waxy cerumen adjacent to tm and extending posteriorly along the eac Treated with h202 soak without softening much, unable to suction   Eye: The ocular movements are full and symmetric, the conjunctiva is unremarkable; sclera are anicteric, pupillary response is symmetric. No nystagmus is found. Nose:   The external nasal contour is normal  The nasal mucosa has a normal appearance. The nasal septum is straight. The turbinates have a normal appearance  The nares are patent without evidence of polyposis   Oral cavity:   The dentition is healthy. The oral mucosa is without lesions;  the tongue is symmetric with full mobility and is without fasciculation. The soft palate is symmetric. The oropharynx is unremarkable. Neck: The neck has a normal contour; no masses are found on palpation        An electronic signature was used to authenticate this note.     --Marah Richardson MD     8/25/2021 2:53 PM EDT

## 2021-08-31 DIAGNOSIS — F41.9 ANXIETY: ICD-10-CM

## 2021-09-01 ENCOUNTER — OFFICE VISIT (OUTPATIENT)
Dept: OTOLARYNGOLOGY | Age: 76
End: 2021-09-01
Payer: MEDICARE

## 2021-09-01 VITALS
HEART RATE: 82 BPM | BODY MASS INDEX: 22.08 KG/M2 | HEIGHT: 62 IN | SYSTOLIC BLOOD PRESSURE: 124 MMHG | OXYGEN SATURATION: 98 % | WEIGHT: 120 LBS | DIASTOLIC BLOOD PRESSURE: 74 MMHG

## 2021-09-01 DIAGNOSIS — B36.9 OTOMYCOSIS OF LEFT EAR: Primary | ICD-10-CM

## 2021-09-01 DIAGNOSIS — H62.42 OTOMYCOSIS OF LEFT EAR: Primary | ICD-10-CM

## 2021-09-01 PROCEDURE — 99213 OFFICE O/P EST LOW 20 MIN: CPT | Performed by: OTOLARYNGOLOGY

## 2021-09-01 NOTE — PROGRESS NOTES
2021 2:53 PM EDT  Margo Lemus (:  1945) is a 77 y.o. female,New patient, here for evaluation of the following chief complaint(s):  Ear Problem (1 week follow up peroxide left ear)      ASSESSMENT/PLAN:  1. Otomycosis of left ear      1. Otomycosis of left ear      Fu next week to repeat cleaning on the left ear  Repeat audio after cleaning   Discussed etd and possible otosclerosis/tympanosclerosis   No follow-ups on file. SUBJECTIVE/OBJECTIVE:  HPI  69yo woman with a plugged feeling in her left ear. Ears were recently flushed. Is wondering if the left ear has cerumen. Audio 21: Moderate to severe mixed HL AU   Type As tymp AU   % AU     Was noted to have plaque of cerumen deep in the left ear canal that did not respond to hydrogen peroxide. She has been doing hydrogen peroxide soaks for the last few days. Returns for repeat cleaning. Has itching of the left ear.     Past Medical History:   Diagnosis Date    Leukoplakia, gingiva     Lung cancer, upper lobe ([address] 2019    Osteopenia      Past Surgical History:   Procedure Laterality Date    BRONCHOSCOPY  2019    CATARACT REMOVAL Bilateral     COLONOSCOPY      tubular adenoma with low grade dysplasia     TONSILLECTOMY AND ADENOIDECTOMY      TUBAL LIGATION       Social History     Tobacco History     Smoking Status  Former Smoker Quit date  2019 Smoking Frequency  0.5 packs/day for 53 years (26.5 pk yrs) Smoking Tobacco Type  Cigarettes    Smokeless Tobacco Use  Never Used    Tobacco Comment  up to 1 ppd          Alcohol History     Alcohol Use Status  Not Asked          Drug Use     Drug Use Status  Not Asked          Sexual Activity     Sexually Active  Not Asked              Family History   Problem Relation Age of Onset   Yana Ping Cancer Mother         multiple myeloma    Cancer Father         throat cancer    Thyroid Disease Sister     Thyroid Disease Sister     Thyroid Disease Daughter      Current Outpatient Medications   Medication Instructions    acetaminophen (TYLENOL) 500 mg, Oral, EVERY 6 HOURS PRN    Ascorbic Acid (VITAMIN C) 500 MG CAPS Vitamin C TABS  Refills: 0  Active    aspirin 325 mg, Oral, DAILY    atorvastatin (LIPITOR) 40 mg, Oral, DAILY    Calcium Carbonate-Vit D-Min (CALCIUM 1200 PO) Oral    escitalopram (LEXAPRO) 10 mg, Oral, DAILY    fluticasone (GNP FLUTICASONE PROPIONATE) 50 MCG/ACT nasal spray 2 sprays, Nasal, DAILY    HYDROcodone-acetaminophen (NORCO) 5-325 MG per tablet take 1 to 2 tablets by mouth every 4 to 6 hours AS NEEDED FOR PAIN    ibuprofen (ADVIL;MOTRIN) 200 mg, Oral, EVERY 6 HOURS PRN    levothyroxine (SYNTHROID) 25 mcg, Oral, DAILY    lidocaine-prilocaine (EMLA) 2.5-2.5 % cream apply topically TO PORT SITE 60 TO 90  MINS PRIOR TO TREATMENT ADN COVER WITH PLASTIC WRAP    predniSONE (DELTASONE) 10 mg, Oral, EVERY OTHER DAY    Probiotic Product (PROBIOTIC DAILY PO) Oral    tiZANidine (ZANAFLEX) 4 MG tablet take 1 tablet by mouth every 6 hours if needed for muscle spasm     No Known Allergies    ENT ROS: positive for - hearing changes     General: The patient is found to be alert and normally responsive female with grossly normal hearing, clear voice and normal articulation. Communication is without difficulty. Voice: Clear   Skin: The skin has normal colour and turgor. Face: The facial contour is symmetric at rest and with movement. Ears: The pinnae have normal contours. AD: EAC clear, TM with perforation anterior/inferior with clean and dry edges   Otomicroscope: thickened TM, shallow retraction pocket in pars flaccida, tm perforation with clean edges   AS: EAC with mucoid tacky thick otorrhea c/w otomycosis, suctioned #5, #3  Boric acid treatment AS  Eye: The ocular movements are full and symmetric, the conjunctiva is unremarkable; sclera are anicteric, pupillary response is symmetric. No nystagmus is found.     Nose:   The external nasal contour is normal  The nasal mucosa has a normal appearance. The nasal septum is straight. The turbinates have a normal appearance  The nares are patent without evidence of polyposis   Oral cavity:   The dentition is healthy. The oral mucosa is without lesions;  the tongue is symmetric with full mobility and is without fasciculation. The soft palate is symmetric. The oropharynx is unremarkable. Neck: The neck has a normal contour; no masses are found on palpation    An electronic signature was used to authenticate this note.     --Cristian Harper MD     9/1/2021 2:53 PM EDT

## 2021-09-02 RX ORDER — ESCITALOPRAM OXALATE 10 MG/1
10 TABLET ORAL DAILY
Qty: 30 TABLET | Refills: 5 | Status: SHIPPED | OUTPATIENT
Start: 2021-09-02 | End: 2022-02-23

## 2021-09-02 NOTE — TELEPHONE ENCOUNTER
Alok Merlos is requesting a refill on the following medication(s):  Requested Prescriptions     Pending Prescriptions Disp Refills    escitalopram (LEXAPRO) 10 MG tablet 30 tablet 5     Sig: Take 1 tablet by mouth daily       Last Visit Date (If Applicable):  2/8/3082    Next Visit Date:    9/2/2021

## 2021-09-10 ENCOUNTER — OFFICE VISIT (OUTPATIENT)
Dept: OTOLARYNGOLOGY | Age: 76
End: 2021-09-10
Payer: MEDICARE

## 2021-09-10 VITALS
BODY MASS INDEX: 22.18 KG/M2 | WEIGHT: 120.5 LBS | HEIGHT: 62 IN | OXYGEN SATURATION: 98 % | SYSTOLIC BLOOD PRESSURE: 132 MMHG | DIASTOLIC BLOOD PRESSURE: 82 MMHG | HEART RATE: 88 BPM

## 2021-09-10 DIAGNOSIS — H62.42 OTOMYCOSIS OF LEFT EAR: Primary | ICD-10-CM

## 2021-09-10 DIAGNOSIS — B36.9 OTOMYCOSIS OF LEFT EAR: Primary | ICD-10-CM

## 2021-09-10 PROCEDURE — 99213 OFFICE O/P EST LOW 20 MIN: CPT | Performed by: OTOLARYNGOLOGY

## 2021-09-10 NOTE — PROGRESS NOTES
9/10/2021 2:53 PM EDT  Douglas Spencer (:  1945) is a 77 y.o. female,New patient, here for evaluation of the following chief complaint(s):  Follow-up (1 week follow up powder left ear )      ASSESSMENT/PLAN:  1. Otomycosis of left ear      1. Otomycosis of left ear      Fu in 2 weeks   Repeat audio after treatment   Discussed etd and possible otosclerosis/tympanosclerosis   No follow-ups on file. SUBJECTIVE/OBJECTIVE:  Ear Problem    76yo woman with a plugged feeling in her left ear. Ears were recently flushed. Is wondering if the left ear has cerumen. Audio 21: Moderate to severe mixed HL AU   Type As tymp AU   % AU     Was noted to have plaque of cerumen deep in the left ear canal that did not respond to hydrogen peroxide. She has been doing hydrogen peroxide soaks for the last few days. Returned for repeat cleaning that was successful. Noted to be fungal in nature. Treated with boric acid. States the hearing is much better in her left ear. Less itching.      Past Medical History:   Diagnosis Date    Leukoplakia, gingiva     Lung cancer, upper lobe ([address] 2019    Osteopenia      Past Surgical History:   Procedure Laterality Date    BRONCHOSCOPY  2019    CATARACT REMOVAL Bilateral     COLONOSCOPY  2009    tubular adenoma with low grade dysplasia     TONSILLECTOMY AND ADENOIDECTOMY      TUBAL LIGATION       Social History     Tobacco History     Smoking Status  Former Smoker Quit date  2019 Smoking Frequency  0.5 packs/day for 48 years (26.5 pk yrs) Smoking Tobacco Type  Cigarettes    Smokeless Tobacco Use  Never Used    Tobacco Comment  up to 1 ppd          Alcohol History     Alcohol Use Status  Not Asked          Drug Use     Drug Use Status  Not Asked          Sexual Activity     Sexually Active  Not Asked              Family History   Problem Relation Age of Onset    Cancer Mother         multiple myeloma    Cancer Father         throat cancer    Thyroid Disease LOV 12-19-16  Last refill [phone] #[phone]  Please advise   Sister     Thyroid Disease Sister     Thyroid Disease Daughter      Current Outpatient Medications   Medication Instructions    acetaminophen (TYLENOL) 500 mg, Oral, EVERY 6 HOURS PRN    Ascorbic Acid (VITAMIN C) 500 MG CAPS Vitamin C TABS  Refills: 0  Active    aspirin 325 mg, Oral, DAILY    atorvastatin (LIPITOR) 40 mg, Oral, DAILY    Calcium Carbonate-Vit D-Min (CALCIUM 1200 PO) Oral    escitalopram (LEXAPRO) 10 mg, Oral, DAILY    fluticasone (GNP FLUTICASONE PROPIONATE) 50 MCG/ACT nasal spray 2 sprays, Nasal, DAILY    HYDROcodone-acetaminophen (NORCO) 5-325 MG per tablet take 1 to 2 tablets by mouth every 4 to 6 hours AS NEEDED FOR PAIN    ibuprofen (ADVIL;MOTRIN) 200 mg, Oral, EVERY 6 HOURS PRN    levothyroxine (SYNTHROID) 25 mcg, Oral, DAILY    lidocaine-prilocaine (EMLA) 2.5-2.5 % cream apply topically TO PORT SITE 60 TO 90  MINS PRIOR TO TREATMENT ADN COVER WITH PLASTIC WRAP    predniSONE (DELTASONE) 10 mg, Oral, EVERY OTHER DAY    Probiotic Product (PROBIOTIC DAILY PO) Oral    tiZANidine (ZANAFLEX) 4 MG tablet take 1 tablet by mouth every 6 hours if needed for muscle spasm     No Known Allergies    ENT ROS: positive for - hearing changes     General: The patient is found to be alert and normally responsive female with grossly normal hearing, clear voice and normal articulation. Communication is without difficulty. Voice: Clear   Skin: The skin has normal colour and turgor. Face: The facial contour is symmetric at rest and with movement. Ears: The pinnae have normal contours. AD: EAC clear, TM with perforation anterior/inferior with clean and dry edges   Otomicroscope: thickened TM, shallow retraction pocket in pars flaccida, tm perforation with clean edges   AS: EAC clear, tm thickened  Boric acid treatment AS #2  Eye: The ocular movements are full and symmetric, the conjunctiva is unremarkable; sclera are anicteric, pupillary response is symmetric. No nystagmus is found.     Nose: The external nasal contour is normal  The nasal mucosa has a normal appearance. The nasal septum is straight. The turbinates have a normal appearance  The nares are patent without evidence of polyposis   Oral cavity:   The dentition is healthy. The oral mucosa is without lesions;  the tongue is symmetric with full mobility and is without fasciculation. The soft palate is symmetric. The oropharynx is unremarkable. Neck: The neck has a normal contour; no masses are found on palpation    An electronic signature was used to authenticate this note.     --Lauryn Keller MD     9/10/2021 2:53 PM EDT

## 2021-09-20 ENCOUNTER — OFFICE VISIT (OUTPATIENT)
Dept: FAMILY MEDICINE CLINIC | Age: 76
End: 2021-09-20
Payer: MEDICARE

## 2021-09-20 VITALS
HEIGHT: 62 IN | RESPIRATION RATE: 20 BRPM | OXYGEN SATURATION: 95 % | HEART RATE: 102 BPM | DIASTOLIC BLOOD PRESSURE: 82 MMHG | WEIGHT: 124 LBS | BODY MASS INDEX: 22.82 KG/M2 | SYSTOLIC BLOOD PRESSURE: 122 MMHG

## 2021-09-20 DIAGNOSIS — E03.9 ACQUIRED HYPOTHYROIDISM: ICD-10-CM

## 2021-09-20 DIAGNOSIS — D50.9 IRON DEFICIENCY ANEMIA, UNSPECIFIED IRON DEFICIENCY ANEMIA TYPE: ICD-10-CM

## 2021-09-20 DIAGNOSIS — C34.91 SQUAMOUS CELL CARCINOMA OF LUNG, STAGE III, RIGHT (HCC): ICD-10-CM

## 2021-09-20 DIAGNOSIS — Z23 NEEDS FLU SHOT: ICD-10-CM

## 2021-09-20 DIAGNOSIS — I10 ESSENTIAL HYPERTENSION: Primary | ICD-10-CM

## 2021-09-20 DIAGNOSIS — I65.23 BILATERAL CAROTID ARTERY STENOSIS: ICD-10-CM

## 2021-09-20 PROCEDURE — G0008 ADMIN INFLUENZA VIRUS VAC: HCPCS | Performed by: FAMILY MEDICINE

## 2021-09-20 PROCEDURE — 99213 OFFICE O/P EST LOW 20 MIN: CPT | Performed by: FAMILY MEDICINE

## 2021-09-20 PROCEDURE — 93880 EXTRACRANIAL BILAT STUDY: CPT | Performed by: FAMILY MEDICINE

## 2021-09-20 PROCEDURE — 99214 OFFICE O/P EST MOD 30 MIN: CPT | Performed by: FAMILY MEDICINE

## 2021-09-20 PROCEDURE — PBSHW INFLUENZA, QUADV, ADJUVANTED, 65 YRS +, IM, PF, PREFILL SYR, 0.5ML (FLUAD): Performed by: FAMILY MEDICINE

## 2021-09-20 RX ORDER — TIZANIDINE 4 MG/1
TABLET ORAL
Qty: 30 TABLET | Refills: 0 | Status: SHIPPED | OUTPATIENT
Start: 2021-09-20 | End: 2022-07-21

## 2021-09-20 NOTE — PROGRESS NOTES
[address] [facility]  [address]  Dept: [phone]  Dept JOD:[phone]    Margo Lemus is a 77 y.o. female who presents today for her medical conditions/complaints as notedbelow. Margo Lemus is c/o of Follow-up (2 month f/u )      HPI:     HPI    Mary Norris is here today for follow-up of her hypertension. She had low blood pressures noted at her hospital discharge 2 months ago after her fall. She has remained off of her losartan since that time. Blood pressures have made obtained in the normal range. Hemoglobin is improving but it is very slow and gradual.  Up to 10.7 on September 8. Normocytic normal chromatic. Is up and moving around well. Is lightheaded once in awhile. SOB is much better than it used to be,      Did see Dr. Susan Espinal for the persistent hearing loss. Dr. Susan Espinal was able to clean the ear with deep cleaning and noted fungal morphology in the ear. Treated with boric acid with significant improvement in the itching and hearing. Still has some minor issue. Will need some hearing aides she knows. Has a follow up tomorrow as well.      BP Readings from Last 3 Encounters:   09/20/21 122/82   09/10/21 132/82   09/01/21 124/74          (goal 120/80)    Wt Readings from Last 3 Encounters:   09/20/21 124 lb (56.2 kg)   09/10/21 120 lb 8 oz (54.7 kg)   09/01/21 120 lb (54.4 kg)        Past Medical History:   Diagnosis Date    Leukoplakia, gingiva     Lung cancer, upper lobe ([address] 2019    Osteopenia       Past Surgical History:   Procedure Laterality Date    BRONCHOSCOPY  05/02/2019    CATARACT REMOVAL Bilateral     COLONOSCOPY  2009    tubular adenoma with low grade dysplasia     TONSILLECTOMY AND ADENOIDECTOMY      TUBAL LIGATION         Family History   Problem Relation Age of Onset    Cancer Mother         multiple myeloma    Cancer Father         throat cancer    Thyroid Disease Sister     Thyroid Disease Sister    Yana Dennis Thyroid Disease Daughter        Social History     Tobacco Use    Smoking status: Former Smoker     Packs/day: 0.50     Years: 53.00     Pack years: 26.50     Types: Cigarettes     Quit date: 2019     Years since quittin.4    Smokeless tobacco: Never Used    Tobacco comment: up to 1 ppd   Substance Use Topics    Alcohol use: Not on file      Current Outpatient Medications   Medication Sig Dispense Refill    tiZANidine (ZANAFLEX) 4 MG tablet take 1 tablet by mouth every 6 hours if needed for muscle spasm 30 tablet 0    escitalopram (LEXAPRO) 10 MG tablet Take 1 tablet by mouth daily 30 tablet 5    atorvastatin (LIPITOR) 40 MG tablet Take 1 tablet by mouth daily 30 tablet 11    HYDROcodone-acetaminophen (NORCO) 5-325 MG per tablet take 1 to 2 tablets by mouth every 4 to 6 hours AS NEEDED FOR PAIN      predniSONE (DELTASONE) 20 MG tablet Take 10 mg by mouth every other day       levothyroxine (SYNTHROID) 25 MCG tablet Take 25 mcg by mouth daily      aspirin 325 MG tablet Take 325 mg by mouth daily      fluticasone (GNP FLUTICASONE PROPIONATE) 50 MCG/ACT nasal spray 2 sprays by Nasal route daily 3 Bottle 3    lidocaine-prilocaine (EMLA) 2.5-2.5 % cream apply topically TO PORT SITE 60 TO 90  MINS PRIOR TO TREATMENT ADN COVER WITH PLASTIC WRAP  0    Probiotic Product (PROBIOTIC DAILY PO) Take by mouth      acetaminophen (TYLENOL) 500 MG tablet Take 500 mg by mouth every 6 hours as needed for Pain      ibuprofen (ADVIL;MOTRIN) 200 MG tablet Take 200 mg by mouth every 6 hours as needed for Pain       Ascorbic Acid (VITAMIN C) 500 MG CAPS Vitamin C TABS  Refills: 0  Active      Calcium Carbonate-Vit D-Min (CALCIUM 1200 PO) Take by mouth       No current facility-administered medications for this visit.      No Known Allergies    Health Maintenance   Topic Date Due    Shingles Vaccine (1 of 2) Never done    Breast cancer screen  2016    Annual Wellness Visit (AWV)  Never done    DTaP/Tdap/Td vaccine (2 - Td or Tdap) 03/11/2021    A1C test (Diabetic or Prediabetic)  03/24/2022    Lipid screen  03/24/2022    Low dose CT lung screening  06/18/2022    TSH testing  09/08/2022    Colon cancer screen colonoscopy  12/23/2029    DEXA (modify frequency per FRAX score)  Completed    Flu vaccine  Completed    Pneumococcal 65+ years Vaccine  Completed    COVID-19 Vaccine  Completed    Hepatitis C screen  Completed    Hepatitis A vaccine  Aged Out    Hepatitis B vaccine  Aged Out    Hib vaccine  Aged Out    Meningococcal (ACWY) vaccine  Aged Out       Subjective:      Review of Systems    Objective:     /82 (Site: Left Upper Arm, Position: Sitting, Cuff Size: Large Adult)   Pulse 102   Resp 20   Ht 5' 2\" (1.575 m)   Wt 124 lb (56.2 kg)   SpO2 95%   BMI 22.68 kg/m²     Physical Exam  Vitals and nursing note reviewed. Constitutional:       General: She is not in acute distress. Appearance: Normal appearance. She is well-developed. She is not ill-appearing. Comments: Alert and oriented    HENT:      Head: Normocephalic. Ears:      Comments: Moderate cerumen on the right canal only     Mouth/Throat:      Mouth: Mucous membranes are moist.   Eyes:      General: No scleral icterus. Conjunctiva/sclera: Conjunctivae normal.      Pupils: Pupils are equal, round, and reactive to light. Neck:      Thyroid: No thyromegaly. Vascular: Carotid bruit (bilateral harsh carotid bruits) present. Cardiovascular:      Rate and Rhythm: Normal rate and regular rhythm. Pulses: Normal pulses. Heart sounds: Normal heart sounds. No murmur heard. Pulmonary:      Effort: Pulmonary effort is normal.      Breath sounds: Normal breath sounds. Abdominal:      General: Bowel sounds are normal.      Palpations: Abdomen is soft. Musculoskeletal:         General: No deformity. Cervical back: Normal range of motion and neck supple. No muscular tenderness.       Right lower leg: No edema. Left lower leg: No edema. Lymphadenopathy:      Cervical: No cervical adenopathy. Skin:     Capillary Refill: Capillary refill takes less than 2 seconds. Findings: No erythema. Neurological:      General: No focal deficit present. Mental Status: She is alert and oriented to person, place, and time. Cranial Nerves: No cranial nerve deficit. Sensory: No sensory deficit. Comments: Still hard of hearing   Psychiatric:         Mood and Affect: Mood normal.         Behavior: Behavior normal.         Thought Content: Thought content normal.         Judgment: Judgment normal.         Assessment/Plan:     1. Essential hypertension  2. Bilateral carotid artery stenosis  3. Squamous cell carcinoma of lung, stage III, right ([address]  4. Acquired hypothyroidism  5. Iron deficiency anemia, unspecified iron deficiency anemia type  -     Iron and TIBC; Future  6. Needs flu shot  -     INFLUENZA, QUADV, ADJUVANTED, 72 YRS =, IM, PF, PREFILL SYR, 0.5ML (FLUAD)      Check the iron studies to see if this is needed to supplement or if the persistent anemia is related to the [facility]    HTN is controlled at this time without any medications, Will continue to monitor this closely. Has the history of the bilateral carotid stenosis- asx but has not been evaluated in over 2 years and is not currently on treatment. Would recheck the carotids to make sure no significant progression requiring treatment in light of significant loud bruits noted. Thyroid is asx and at goal -- check annually at least levels.      Lab Results   Component Value Date    WBC 10.8 09/08/2021    HGB 10.7 (L) 09/08/2021    HCT 32.7 (L) 09/08/2021    .4 (H) 09/08/2021    CHOL 214 03/24/2021    TRIG 52 03/24/2021     (A) 03/24/2021    ALT 27 09/08/2021    AST 39 (H) 09/08/2021     09/08/2021    K 4.1 09/08/2021     09/08/2021    CREATININE 0.8 09/08/2021    BUN 19 (H) 09/08/2021    CO2 26 09/08/2021 TSH 3.31 03/03/2021    INR 1.1 07/15/2021    LABA1C 5.8 03/24/2021       Return in about 4 months (around 1/20/2022) for AWV. Patient given educational materials - see patientinstructions. Discussed use, benefit, and side effects of prescribed medications. All patient questions answered. Pt voiced understanding. Reviewed health maintenance. Instructed to continue current medications, diet andexercise. Patient agreed with treatment plan. Follow up as directed.      (Please note that portions of this note were completed with a voice-recognition program. Efforts were made to edit the dictation but occasionally words are mis-transcribed.)    Electronically signed by Dorethea Epley, MD on 9/20/2021

## 2021-09-21 ENCOUNTER — OFFICE VISIT (OUTPATIENT)
Dept: OTOLARYNGOLOGY | Age: 76
End: 2021-09-21
Payer: MEDICARE

## 2021-09-21 VITALS
SYSTOLIC BLOOD PRESSURE: 128 MMHG | DIASTOLIC BLOOD PRESSURE: 64 MMHG | OXYGEN SATURATION: 97 % | BODY MASS INDEX: 22.45 KG/M2 | HEIGHT: 62 IN | WEIGHT: 122 LBS | HEART RATE: 86 BPM

## 2021-09-21 DIAGNOSIS — H72.91 TYMPANIC MEMBRANE PERFORATION, RIGHT: ICD-10-CM

## 2021-09-21 DIAGNOSIS — H90.6 MIXED CONDUCTIVE AND SENSORINEURAL HEARING LOSS OF BOTH EARS: Primary | ICD-10-CM

## 2021-09-21 PROCEDURE — 99213 OFFICE O/P EST LOW 20 MIN: CPT | Performed by: OTOLARYNGOLOGY

## 2021-09-21 NOTE — PROGRESS NOTES
2021 2:53 PM EDT  Raina Feldman (:  1945) is a 77 y.o. female,New patient, here for evaluation of the following chief complaint(s):  Ear Problem (2 week follow up powder left ear)      ASSESSMENT/PLAN:  1. Mixed conductive and sensorineural hearing loss of both ears    2. Tympanic membrane perforation, right      1. Mixed conductive and sensorineural hearing loss of both ears  2. Tympanic membrane perforation, right      Fu in 1 year for cleaning and repeat audio   h202 to the left ear once a week   Put ears under otomicroscope at follow up  Repeat audio after treatment   Discussed etd and possible otosclerosis/tympanosclerosis   No follow-ups on file. SUBJECTIVE/OBJECTIVE:  Ear Problem    74yo woman with a plugged feeling in her left ear. Ears were recently flushed. Is wondering if the left ear has cerumen. Audio 21: Moderate to severe mixed HL AU   Type As tymp AU   % AU     Was noted to have plaque of cerumen deep in the left ear canal that did not respond to hydrogen peroxide. She has been doing hydrogen peroxide soaks for the last few days. Returned for repeat cleaning that was successful. Noted to be fungal in nature. Treated with boric acid x2. States the hearing is much better in her left ear. Less itching. Does not think she needs to pursue hearing aids at this time.      Past Medical History:   Diagnosis Date    Leukoplakia, gingiva     Lung cancer, upper lobe ([address] 2019    Osteopenia      Past Surgical History:   Procedure Laterality Date    BRONCHOSCOPY  2019    CATARACT REMOVAL Bilateral     COLONOSCOPY  2009    tubular adenoma with low grade dysplasia     TONSILLECTOMY AND ADENOIDECTOMY      TUBAL LIGATION       Social History     Tobacco History     Smoking Status  Former Smoker Quit date  2019 Smoking Frequency  0.5 packs/day for 53 years (26.5 pk yrs) Smoking Tobacco Type  Cigarettes    Smokeless Tobacco Use  Never Used    Tobacco Comment  up to 1 ppd          Alcohol History     Alcohol Use Status  Not Asked          Drug Use     Drug Use Status  Not Asked          Sexual Activity     Sexually Active  Not Asked              Family History   Problem Relation Age of Onset    Cancer Mother         multiple myeloma    Cancer Father         throat cancer    Thyroid Disease Sister     Thyroid Disease Sister     Thyroid Disease Daughter      Current Outpatient Medications   Medication Instructions    acetaminophen (TYLENOL) 500 mg, Oral, EVERY 6 HOURS PRN    Ascorbic Acid (VITAMIN C) 500 MG CAPS Vitamin C TABS  Refills: 0  Active    aspirin 325 mg, Oral, DAILY    atorvastatin (LIPITOR) 40 mg, Oral, DAILY    Calcium Carbonate-Vit D-Min (CALCIUM 1200 PO) Oral    escitalopram (LEXAPRO) 10 mg, Oral, DAILY    fluticasone (GNP FLUTICASONE PROPIONATE) 50 MCG/ACT nasal spray 2 sprays, Nasal, DAILY    HYDROcodone-acetaminophen (NORCO) 5-325 MG per tablet take 1 to 2 tablets by mouth every 4 to 6 hours AS NEEDED FOR PAIN    ibuprofen (ADVIL;MOTRIN) 200 mg, Oral, EVERY 6 HOURS PRN    levothyroxine (SYNTHROID) 25 mcg, Oral, DAILY    lidocaine-prilocaine (EMLA) 2.5-2.5 % cream apply topically TO PORT SITE 60 TO 90  MINS PRIOR TO TREATMENT ADN COVER WITH PLASTIC WRAP    predniSONE (DELTASONE) 10 mg, Oral, EVERY OTHER DAY    Probiotic Product (PROBIOTIC DAILY PO) Oral    tiZANidine (ZANAFLEX) 4 MG tablet take 1 tablet by mouth every 6 hours if needed for muscle spasm     No Known Allergies    ENT ROS: positive for - hearing changes     General: The patient is found to be alert and normally responsive female with grossly normal hearing, clear voice and normal articulation. Communication is without difficulty. Voice: Clear   Skin: The skin has normal colour and turgor. Face: The facial contour is symmetric at rest and with movement. Ears: The pinnae have normal contours.     AD: EAC clear, TM with perforation anterior/inferior with clean and dry edges   AS Otomicroscope: thickened TM, shallow retraction pocket in pars flaccida, tm perforation with clean edges   AS: EAC clear, tm thickened  Eye: The ocular movements are full and symmetric, the conjunctiva is unremarkable; sclera are anicteric, pupillary response is symmetric. No nystagmus is found. Nose:   The external nasal contour is normal  The nasal mucosa has a normal appearance. The nasal septum is straight. The turbinates have a normal appearance  The nares are patent without evidence of polyposis   Oral cavity:   The dentition is healthy. The oral mucosa is without lesions;  the tongue is symmetric with full mobility and is without fasciculation. The soft palate is symmetric. The oropharynx is unremarkable. Neck: The neck has a normal contour; no masses are found on palpation    An electronic signature was used to authenticate this note.     --Christine Roca MD     9/21/2021 2:53 PM EDT

## 2021-09-29 LAB
IRON SATURATION: 11 % (ref 15–38)
IRON: 40 MG/DL (ref 37–170)
TOTAL IRON BINDING CAPACITY: 363 MG/DL (ref 261–497)

## 2021-11-08 ENCOUNTER — TELEPHONE (OUTPATIENT)
Dept: FAMILY MEDICINE CLINIC | Age: 76
End: 2021-11-08

## 2021-11-08 NOTE — TELEPHONE ENCOUNTER
Patient was on losartan HCTZ in the past. It was stopped in august after her hip surgery. She was at Dr Talon Moeller office today and her BP was high - 157/120, 157/100, 176/91. Daughter took her bp when they got home with a wrist BP cuff and it was 157/107. Would you like her to restart losartan? She did schedule an appointment for 11/18. Is this date ok or does she need a sooner appointment? She is having significant hearing loss again. She would like her ears checked at her next appointment.

## 2021-11-18 ENCOUNTER — OFFICE VISIT (OUTPATIENT)
Dept: FAMILY MEDICINE CLINIC | Age: 76
End: 2021-11-18
Payer: MEDICARE

## 2021-11-18 VITALS
HEIGHT: 62 IN | BODY MASS INDEX: 22.26 KG/M2 | HEART RATE: 90 BPM | RESPIRATION RATE: 20 BRPM | OXYGEN SATURATION: 99 % | SYSTOLIC BLOOD PRESSURE: 120 MMHG | DIASTOLIC BLOOD PRESSURE: 82 MMHG | WEIGHT: 121 LBS

## 2021-11-18 DIAGNOSIS — H91.92 DECREASED HEARING OF LEFT EAR: ICD-10-CM

## 2021-11-18 DIAGNOSIS — J30.9 CHRONIC ALLERGIC RHINITIS: ICD-10-CM

## 2021-11-18 DIAGNOSIS — I15.8 OTHER SECONDARY HYPERTENSION: Primary | ICD-10-CM

## 2021-11-18 DIAGNOSIS — I65.23 BILATERAL CAROTID ARTERY STENOSIS: ICD-10-CM

## 2021-11-18 LAB
ANION GAP SERPL CALCULATED.3IONS-SCNC: 7.3 MMOL/L
BUN BLDV-MCNC: 18 MG/DL (ref 7–17)
CALCIUM SERPL-MCNC: 9.4 MG/DL (ref 8.4–10.2)
CHLORIDE BLD-SCNC: 101 MMOL/L (ref 98–120)
CO2: 29 MMOL/L (ref 22–31)
CREAT SERPL-MCNC: 0.8 MG/DL (ref 0.5–1)
GFR CALCULATED: > 60
GLUCOSE: 90 MG/DL (ref 65–105)
POTASSIUM SERPL-SCNC: 4.6 MMOL/L (ref 3.6–5)
SODIUM BLD-SCNC: 137 MMOL/L (ref 135–145)

## 2021-11-18 PROCEDURE — 99213 OFFICE O/P EST LOW 20 MIN: CPT | Performed by: FAMILY MEDICINE

## 2021-11-18 PROCEDURE — 99214 OFFICE O/P EST MOD 30 MIN: CPT | Performed by: FAMILY MEDICINE

## 2021-11-18 RX ORDER — PREDNISONE 10 MG/1
10 TABLET ORAL DAILY
COMMUNITY
End: 2022-10-24

## 2021-11-18 RX ORDER — LOSARTAN POTASSIUM AND HYDROCHLOROTHIAZIDE 12.5; 5 MG/1; MG/1
1 TABLET ORAL DAILY
COMMUNITY
End: 2022-04-07 | Stop reason: SDUPTHER

## 2021-11-18 RX ORDER — MULTIVITAMIN WITH IRON
250 TABLET ORAL DAILY
COMMUNITY

## 2021-11-18 NOTE — PROGRESS NOTES
1200 [facility]  [address]  Dept: [phone]  Dept DXP:[phone]    Liz West is a 77 y.o. female who presents today for her medical conditions/complaints as notedbelow. Liz West is c/o of Hypertension      HPI:     HPI    Karla Chavez has continued to use the [facility] for her lung cancer. She did have an appointment with Dr. Edyta Bateman recently and her blood pressure was in the 170s at her appointment there. He recommended that she come in to have evaluation here. She had been off of her hyzaar since her ortho surgery. Recently the blood pressure has been creeping up at her oncology appts so she restarted on 11/10/2021. Has been much better since then 106-120/ 60-80. No CP/ no SOB/ no palpitations. Did have the audiology appt with Karthik Lynn. Dr. Estevan Tenorio did see her for significant hard wax on the ear drum and was treated for this. Seemed like that helped.      BP Readings from Last 3 Encounters:   11/18/21 120/82   09/21/21 128/64   09/20/21 122/82          (goal 120/80)    Wt Readings from Last 3 Encounters:   11/18/21 121 lb (54.9 kg)   09/21/21 122 lb (55.3 kg)   09/20/21 124 lb (56.2 kg)        Past Medical History:   Diagnosis Date    Leukoplakia, gingiva     Lung cancer, upper lobe ([address] 2019    Osteopenia       Past Surgical History:   Procedure Laterality Date    BRONCHOSCOPY  05/02/2019    CATARACT REMOVAL Bilateral     COLONOSCOPY  2009    tubular adenoma with low grade dysplasia     TONSILLECTOMY AND ADENOIDECTOMY      TUBAL LIGATION         Family History   Problem Relation Age of Onset    Cancer Mother         multiple myeloma    Cancer Father         throat cancer    Thyroid Disease Sister     Thyroid Disease Sister     Thyroid Disease Daughter        Social History     Tobacco Use    Smoking status: Former Smoker     Packs/day: 0.50     Years: 53.00     Pack years: 26.50     Types: Cigarettes     Quit date: 2019     Years since quittin.6    Smokeless tobacco: Never Used    Tobacco comment: up to 1 ppd   Substance Use Topics    Alcohol use: Not on file      Current Outpatient Medications   Medication Sig Dispense Refill    predniSONE (DELTASONE) 10 MG tablet Take 10 mg by mouth daily Take one tablet every other day      losartan-hydroCHLOROthiazide (HYZAAR) 50-12.5 MG per tablet Take 1 tablet by mouth daily      magnesium (MAGNESIUM-OXIDE) 250 MG TABS tablet Take 250 mg by mouth daily      Vit C-Cholecalciferol-Arlette Hip 500-1000-20 MG-UNIT-MG CAPS Take by mouth      Multiple Vitamin (MULTI-VITAMIN DAILY PO) Take by mouth      tiZANidine (ZANAFLEX) 4 MG tablet take 1 tablet by mouth every 6 hours if needed for muscle spasm 30 tablet 0    escitalopram (LEXAPRO) 10 MG tablet Take 1 tablet by mouth daily 30 tablet 5    atorvastatin (LIPITOR) 40 MG tablet Take 1 tablet by mouth daily 30 tablet 11    levothyroxine (SYNTHROID) 25 MCG tablet Take 25 mcg by mouth daily      aspirin 325 MG tablet Take 325 mg by mouth daily      fluticasone (GNP FLUTICASONE PROPIONATE) 50 MCG/ACT nasal spray 2 sprays by Nasal route daily 3 Bottle 3    lidocaine-prilocaine (EMLA) 2.5-2.5 % cream apply topically TO PORT SITE 60 TO 90  MINS PRIOR TO TREATMENT ADN COVER WITH PLASTIC WRAP  0    Probiotic Product (PROBIOTIC DAILY PO) Take by mouth      acetaminophen (TYLENOL) 500 MG tablet Take 500 mg by mouth every 6 hours as needed for Pain      ibuprofen (ADVIL;MOTRIN) 200 MG tablet Take 200 mg by mouth every 6 hours as needed for Pain       Ascorbic Acid (VITAMIN C) 500 MG CAPS Vitamin C TABS  Refills: 0  Active      Calcium Carbonate-Vit D-Min (CALCIUM 1200 PO) Take by mouth      HYDROcodone-acetaminophen (NORCO) 5-325 MG per tablet take 1 to 2 tablets by mouth every 4 to 6 hours AS NEEDED FOR PAIN (Patient not taking: Reported on 2021)       No current facility-administered medications for this visit.      No Known Cardiovascular:      Rate and Rhythm: Normal rate and regular rhythm. Pulses: Normal pulses. Heart sounds: Normal heart sounds. No murmur heard. Pulmonary:      Effort: Pulmonary effort is normal.      Breath sounds: Normal breath sounds. Musculoskeletal:         General: No deformity. Cervical back: Normal range of motion and neck supple. No muscular tenderness. Right lower leg: No edema. Left lower leg: No edema. Lymphadenopathy:      Cervical: No cervical adenopathy. Skin:     Capillary Refill: Capillary refill takes less than 2 seconds. Findings: No erythema. Neurological:      General: No focal deficit present. Mental Status: She is alert and oriented to person, place, and time. Cranial Nerves: No cranial nerve deficit. Sensory: No sensory deficit. Comments: Still hard of hearing   Psychiatric:         Mood and Affect: Mood normal.         Behavior: Behavior normal.         Thought Content: Thought content normal.         Judgment: Judgment normal.         Assessment/Plan:     1. Other secondary hypertension  -     Basic Metabolic Panel; Future  2. Bilateral carotid artery stenosis  3. Decreased hearing of left ear  4. Chronic allergic rhinitis    Patient Instructions   Get back to the audiologist for the hearing aides and continue with the peroxide and the flonase to help the hearing aides you get work best.  Keep the year follow up with Dr. Jose Martínez. Continue with the Hyzaar and watch for signs or symptoms of hypotension. Check the basic metabolic today since she has been back on the Hyzaar to verify no renal insufficiency or hypokalemia developing with this medication.   Recheck as scheduled with her next visit in January    Lab Results   Component Value Date    WBC 10.6 11/10/2021    HGB 10.5 (L) 11/10/2021    HCT 30.6 (L) 11/10/2021    .6 11/10/2021    CHOL 214 03/24/2021    TRIG 52 03/24/2021     (A) 03/24/2021    ALT 18 11/10/2021    AST 28 11/10/2021     11/10/2021    K 4.1 11/10/2021     11/10/2021    CREATININE 0.8 11/10/2021    BUN 14 11/10/2021    CO2 26 11/10/2021    TSH 3.31 03/03/2021    INR 1.1 07/15/2021    LABA1C 5.8 03/24/2021       Return for As scheduled. Patient given educational materials - see patientinstructions. Discussed use, benefit, and side effects of prescribed medications. All patient questions answered. Pt voiced understanding. Reviewed health maintenance. Instructed to continue current medications, diet andexercise. Patient agreed with treatment plan. Follow up as directed.      (Please note that portions of this note were completed with a voice-recognition program. Efforts were made to edit the dictation but occasionally words are mis-transcribed.)    Electronically signed by Butch Shaikh MD on 11/18/2021

## 2021-11-18 NOTE — PATIENT INSTRUCTIONS
Get back to the audiologist for the hearing aides and continue with the peroxide and the flonase to help the hearing aides you get work best.  Keep the year follow up with Dr. Jamarcus Brownlee.

## 2021-11-20 RX ORDER — FLUTICASONE PROPIONATE 50 MCG
SPRAY, SUSPENSION (ML) NASAL
Qty: 48 G | Refills: 3 | Status: SHIPPED | OUTPATIENT
Start: 2021-11-20

## 2022-01-20 ENCOUNTER — OFFICE VISIT (OUTPATIENT)
Dept: FAMILY MEDICINE CLINIC | Age: 77
End: 2022-01-20
Payer: MEDICARE

## 2022-01-20 VITALS
WEIGHT: 126 LBS | BODY MASS INDEX: 23.19 KG/M2 | SYSTOLIC BLOOD PRESSURE: 136 MMHG | HEART RATE: 78 BPM | DIASTOLIC BLOOD PRESSURE: 82 MMHG | HEIGHT: 62 IN | OXYGEN SATURATION: 99 %

## 2022-01-20 DIAGNOSIS — C34.91 SQUAMOUS CELL CARCINOMA OF LUNG, STAGE III, RIGHT (HCC): ICD-10-CM

## 2022-01-20 DIAGNOSIS — D50.9 IRON DEFICIENCY ANEMIA, UNSPECIFIED IRON DEFICIENCY ANEMIA TYPE: ICD-10-CM

## 2022-01-20 DIAGNOSIS — I65.23 BILATERAL CAROTID ARTERY STENOSIS: ICD-10-CM

## 2022-01-20 DIAGNOSIS — J41.0 SIMPLE CHRONIC BRONCHITIS (HCC): ICD-10-CM

## 2022-01-20 DIAGNOSIS — E03.9 ACQUIRED HYPOTHYROIDISM: ICD-10-CM

## 2022-01-20 DIAGNOSIS — F41.9 ANXIETY: ICD-10-CM

## 2022-01-20 DIAGNOSIS — I10 ESSENTIAL HYPERTENSION: ICD-10-CM

## 2022-01-20 DIAGNOSIS — Z00.00 ROUTINE GENERAL MEDICAL EXAMINATION AT A HEALTH CARE FACILITY: Primary | ICD-10-CM

## 2022-01-20 DIAGNOSIS — E53.8 B12 DEFICIENCY: ICD-10-CM

## 2022-01-20 LAB
CHOLESTEROL/HDL RATIO: 2.01 RATIO (ref 0–4.5)
CHOLESTEROL: 135 MG/DL (ref 50–200)
HDLC SERPL-MCNC: 67 MG/DL (ref 36–68)
LDL CHOLESTEROL CALCULATED: 55.4 MG/DL (ref 0–160)
TRIGL SERPL-MCNC: 63 MG/DL (ref 10–250)
VITAMIN B-12: 253 PG/ML (ref 239–931)
VLDLC SERPL CALC-MCNC: 13 MG/DL (ref 0–50)

## 2022-01-20 PROCEDURE — G0439 PPPS, SUBSEQ VISIT: HCPCS | Performed by: FAMILY MEDICINE

## 2022-01-20 PROCEDURE — 99214 OFFICE O/P EST MOD 30 MIN: CPT | Performed by: FAMILY MEDICINE

## 2022-01-20 PROCEDURE — G0463 HOSPITAL OUTPT CLINIC VISIT: HCPCS | Performed by: FAMILY MEDICINE

## 2022-01-20 PROCEDURE — 99397 PER PM REEVAL EST PAT 65+ YR: CPT | Performed by: FAMILY MEDICINE

## 2022-01-20 ASSESSMENT — LIFESTYLE VARIABLES: HOW OFTEN DO YOU HAVE A DRINK CONTAINING ALCOHOL: 0

## 2022-01-20 ASSESSMENT — PATIENT HEALTH QUESTIONNAIRE - PHQ9
9. THOUGHTS THAT YOU WOULD BE BETTER OFF DEAD, OR OF HURTING YOURSELF: 0
8. MOVING OR SPEAKING SO SLOWLY THAT OTHER PEOPLE COULD HAVE NOTICED. OR THE OPPOSITE, BEING SO FIGETY OR RESTLESS THAT YOU HAVE BEEN MOVING AROUND A LOT MORE THAN USUAL: 0
6. FEELING BAD ABOUT YOURSELF - OR THAT YOU ARE A FAILURE OR HAVE LET YOURSELF OR YOUR FAMILY DOWN: 0
SUM OF ALL RESPONSES TO PHQ QUESTIONS 1-9: 1
1. LITTLE INTEREST OR PLEASURE IN DOING THINGS: 0
3. TROUBLE FALLING OR STAYING ASLEEP: 0
SUM OF ALL RESPONSES TO PHQ9 QUESTIONS 1 & 2: 0
5. POOR APPETITE OR OVEREATING: 0
10. IF YOU CHECKED OFF ANY PROBLEMS, HOW DIFFICULT HAVE THESE PROBLEMS MADE IT FOR YOU TO DO YOUR WORK, TAKE CARE OF THINGS AT HOME, OR GET ALONG WITH OTHER PEOPLE: 0
4. FEELING TIRED OR HAVING LITTLE ENERGY: 1
SUM OF ALL RESPONSES TO PHQ QUESTIONS 1-9: 1
7. TROUBLE CONCENTRATING ON THINGS, SUCH AS READING THE NEWSPAPER OR WATCHING TELEVISION: 0
2. FEELING DOWN, DEPRESSED OR HOPELESS: 0

## 2022-01-20 NOTE — PROGRESS NOTES
Medicare Annual Wellness Visit  Name: Sancho Naidu Date: 2022   MRN: [id number] Sex: Female   Age: 77 y.o. Ethnicity: Non- / Non    : 1945 Race: White (non-)      Rodriguez Manjarrez is here for Medicare AWV and Hypertension    Screenings for behavioral, psychosocial and functional/safety risks, and cognitive dysfunction are all negative except as indicated below. These results, as well as other patient data from the [address] form, are documented in Flowsheets linked to this Encounter. Continues with her cancer treatment with Dr. Nichole Marquez. Has a brain scan in February. Appetite is good. Cancer seems to be well controlled at this time. Continues on the prednisone every other day    No lightheadedness/ dizziness/ no CP/ no SOB/ no weakness or fatigue. Has been taking her blood pressure medication the Hyzaar regularly. Has not had headaches    Has not had signs or symptoms of hypo or hyperthyroidism. Continues to take the levothyroxine daily. Has not had any changes in her bowels or there are. Energy level has actually slightly improved    No Known Allergies      Prior to Visit Medications    Medication Sig Taking?  Authorizing Provider   fluticasone (FLONASE) 50 MCG/ACT nasal spray instill 2 spray into each nostril once daily Yes Josias Alexander MD   predniSONE (DELTASONE) 10 MG tablet Take 10 mg by mouth daily Take one tablet every other day Yes Historical Provider, MD   losartan-hydroCHLOROthiazide (HYZAAR) 50-12.5 MG per tablet Take 1 tablet by mouth daily Yes Historical Provider, MD   magnesium (MAGNESIUM-OXIDE) 250 MG TABS tablet Take 250 mg by mouth daily Yes Historical Provider, MD   Vit C-Cholecalciferol-[facility] [phone] MG-UNIT-MG CAPS Take by mouth Yes Historical Provider, MD   Multiple Vitamin (MULTI-VITAMIN DAILY PO) Take by mouth Yes Historical Provider, MD   tiZANidine (ZANAFLEX) 4 MG tablet take 1 tablet by mouth every 6 hours if needed for Filomena Higuera MD as PCP - General (Family Medicine)  Amol Rutledge MD as PCP - [facility]  Allison Salter MD as Consulting Physician (Hematology and Oncology)    [facility] Readings from Last 3 Encounters:   01/20/22 126 lb (57.2 kg)   11/18/21 121 lb (54.9 kg)   09/21/21 122 lb (55.3 kg)     Vitals:    01/20/22 0900   BP: 136/82   Site: Right Upper Arm   Position: Sitting   Cuff Size: Small Adult   Pulse: 78   SpO2: 99%   Weight: 126 lb (57.2 kg)   Height: 5' 2\" (1.575 m)     Body mass index is 23.05 kg/m². Based upon direct observation of the patient, evaluation of cognition reveals remote memory intact, recent memory impaired. Physical Exam  Vitals and nursing note reviewed. Constitutional:       General: She is not in acute distress. Appearance: Normal appearance. She is well-developed. She is not ill-appearing. Comments: Alert and oriented    HENT:      Head: Normocephalic. Right Ear: Ear canal and external ear normal. There is no impacted cerumen. Left Ear: Tympanic membrane, ear canal and external ear normal. There is no impacted cerumen. Ears:      Comments: Scant cerumen in the left ear, Right TM small chronic perforation noted     Nose: Nose normal.      Mouth/Throat:      Mouth: Mucous membranes are moist.   Eyes:      General: No scleral icterus. Conjunctiva/sclera: Conjunctivae normal.      Pupils: Pupils are equal, round, and reactive to light. Neck:      Thyroid: No thyromegaly. Vascular: Carotid bruit (bilateral harsh carotid bruits) present. Cardiovascular:      Rate and Rhythm: Normal rate and regular rhythm. Pulses: Normal pulses. Heart sounds: Normal heart sounds. No murmur heard. Pulmonary:      Effort: Pulmonary effort is normal.      Breath sounds: Normal breath sounds. Musculoskeletal:         General: No deformity. Cervical back: Normal range of motion and neck supple. No muscular tenderness.       Right lower leg: No edema. Left lower leg: No edema. Lymphadenopathy:      Cervical: No cervical adenopathy. Skin:     Capillary Refill: Capillary refill takes less than 2 seconds. Findings: No erythema. Neurological:      General: No focal deficit present. Mental Status: She is alert and oriented to person, place, and time. Cranial Nerves: No cranial nerve deficit. Sensory: No sensory deficit. Comments: Still hard of hearing   Psychiatric:         Mood and Affect: Mood normal.         Behavior: Behavior normal.         Thought Content: Thought content normal.         Judgment: Judgment normal.           Patient's complete Health Risk Assessment and screening values have been reviewed and are found in Flowsheets. The following problems were reviewed today and where indicated follow up appointments were made and/or referrals ordered. 1. Routine general medical examination at a health care facility  2. Squamous cell carcinoma of lung, stage III, right ([address]  3. Simple chronic bronchitis ([address]  4. Bilateral carotid artery stenosis  -     Lipid Panel; Future  5. Essential hypertension  6. Acquired hypothyroidism  7. Iron deficiency anemia, unspecified iron deficiency anemia type  8. Anxiety  9. B12 deficiency  -     Vitamin B12; Future      Will check the B12 and the cholesterol today. Has a history of the B12 deficency and is not currently on supplement. With the drop in her MMSE will watch this. Hesitant to add in meds for this with the weight issue. Continue with her oncology therapy with Dr. Rosa Aquino. No changes in her current medications. Monitor the weight with the prednisone therapy. Continue on the levothyroxine at the current dosage. Check every 6 to 12 months to ensure appropriate dosage. Hypertension is asymptomatic well-controlled at this time. No changes in her current medication.   Follow the labs as ordered by Dr. Rosa Aquino to ensure no adverse effects from the hydrochlorothiazide. Anxiety is likewise well controlled. No changes in her current medications        Positive Risk Factor Screenings with Interventions:     Fall Risk:  2 or more falls in past year?: no  Fall with injury in past year?: (!) yes  Fall Risk Interventions:    · Home safety tips provided    Cognitive:   Words recalled: 0 Words Recalled  Clock Drawing Test (CDT) Score: (!) Abnormal  Total Score Interpretation: Positive Mini-Cog  Did the patient refuse to take the cognition test?: No  Cognitive Impairment Interventions:  · Patient declines any further evaluation/treatment for cognitive impairment - mental status has been stable and has supports in place          Health Habits/Nutrition:  Health Habits/Nutrition  Do you exercise for at least 20 minutes 2-3 times per week?: (!) No  Have you lost any weight without trying in the past 3 months?: No  Do you eat only one meal per day?: No  Have you seen the dentist within the past year?: (!) No  Body mass index: 23.04  Health Habits/Nutrition Interventions:  · Inadequate physical activity:  working on increasing her activity  · Dental exam overdue:  has the full dentures    Hearing/Vision:  No exam data present  Hearing/Vision  Do you or your family notice any trouble with your hearing that hasn't been managed with hearing aids?: (!) Yes  Do you have difficulty driving, watching TV, or doing any of your daily activities because of your eyesight?: No  Have you had an eye exam within the past year?: (!) No  Hearing/Vision Interventions:  · Hearing concerns:  getting hearing aides  · Vision concerns:  refered for exam        Personalized Preventive Plan   Current Health Maintenance Status  Immunization History   Administered Date(s) Administered    Vitaly SAMUEL, Primary or Immunocompromised, PF, 100mcg/0.5mL 02/11/2021, 03/11/2021, 10/28/2021    Influenza A ([id number]) Vaccine PF IM 12/14/2009, 12/14/2009    Influenza, High Dose (Fluzone 65 yrs and older) 09/16/2014, 10/21/2015, 09/28/2016, 11/07/2017, 10/10/2018    Influenza, Intradermal, Quadrivalent, Preservative Free 10/04/2019    Influenza, Quadv, adjuvanted, 65 yrs +, IM, PF (Fluad) 09/25/2020, 09/20/2021    Influenza, Triv, inactivated, subunit, adjuvanted, IM (Fluad 65 yrs and older) 11/07/2017, 10/10/2018    Pneumococcal Conjugate 13-valent ([id number]) 10/21/2015    Pneumococcal Polysaccharide ([id number]) 03/28/2012, 10/14/2021    Tdap (Boostrix, Adacel) 03/11/2011, 03/11/2021    Zoster Recombinant (Shingrix) 08/09/2012        Health Maintenance   Topic Date Due    Shingles Vaccine (2 of 2) 10/04/2012    Annual Wellness Visit (AWV)  03/28/2020    A1C test (Diabetic or Prediabetic)  03/24/2022    Low dose CT lung screening  11/02/2022    TSH testing  01/12/2023    Potassium monitoring  01/12/2023    Creatinine monitoring  01/12/2023    Lipid screen  01/20/2023    Depression Screen  01/20/2023    Colon cancer screen colonoscopy  12/23/2029    DTaP/Tdap/Td vaccine (3 - Td or Tdap) 03/11/2031    DEXA (modify frequency per FRAX score)  Completed    Flu vaccine  Completed    Pneumococcal 65+ years Vaccine  Completed    COVID-19 Vaccine  Completed    Hepatitis C screen  Completed    Hepatitis A vaccine  Aged Out    Hepatitis B vaccine  Aged Out    Hib vaccine  Aged Out    Meningococcal (ACWY) vaccine  Aged Out     Recommendations for Best Before Media Due: see orders and patient instructions/AVS.  . Recommended screening schedule for the next 5-10 years is provided to the patient in written form: see Patient Nicolasa Miner was seen today for medicare awv and hypertension. Diagnoses and all orders for this visit:    Routine general medical examination at a health care facility    Squamous cell carcinoma of lung, stage III, right (HCC)    Simple chronic bronchitis (HCC)    Bilateral carotid artery stenosis  -     Lipid Panel;  Future    Essential hypertension    Acquired hypothyroidism    Iron deficiency anemia, unspecified iron deficiency anemia type    Anxiety    B12 deficiency  -     Vitamin B12; Future    Other orders  -     Lipid Panel  -     Vitamin B12

## 2022-01-20 NOTE — PATIENT INSTRUCTIONS
Personalized Preventive Plan for Para Primer - 1/20/2022  Medicare offers a range of preventive health benefits. Some of the tests and screenings are paid in full while other may be subject to a deductible, co-insurance, and/or copay. Some of these benefits include a comprehensive review of your medical history including lifestyle, illnesses that may run in your family, and various assessments and screenings as appropriate. After reviewing your medical record and screening and assessments performed today your provider may have ordered immunizations, labs, imaging, and/or referrals for you. A list of these orders (if applicable) as well as your Preventive Care list are included within your After Visit Summary for your review. Other Preventive Recommendations:    · A preventive eye exam performed by an eye specialist is recommended every 1-2 years to screen for glaucoma; cataracts, macular degeneration, and other eye disorders. · A preventive dental visit is recommended every 6 months. · Try to get at least 150 minutes of exercise per week or 10,000 steps per day on a pedometer . · Order or download the FREE \"Exercise & Physical Activity: Your Everyday Guide\" from [employer] on [employer]. Call 2-465.242.4918 or search [employer] on Aging online. · You need 9002-4736 mg of calcium and 1866-9786 IU of vitamin D per day. It is possible to meet your calcium requirement with diet alone, but a vitamin D supplement is usually necessary to meet this goal.  · When exposed to the sun, use a sunscreen that protects against both UVA and UVB radiation with an SPF of 30 or greater. Reapply every 2 to 3 hours or after sweating, drying off with a towel, or swimming. · Always wear a seat belt when traveling in a car. Always wear a helmet when riding a bicycle or motorcycle.

## 2022-01-21 ENCOUNTER — TELEPHONE (OUTPATIENT)
Dept: FAMILY MEDICINE CLINIC | Age: 77
End: 2022-01-21

## 2022-01-21 NOTE — TELEPHONE ENCOUNTER
That would have been the initial Zostavax. Shingrix is the newer shingles vaccine and is a better option. It is recommended if you only received the Zostavax which was the initial shingles vaccine that you do have the second shingles vaccine which is 2 part vaccine as well. Did she have the Tdap in 2012?   If she did then it is 2022 and she is due for another booster

## 2022-01-21 NOTE — TELEPHONE ENCOUNTER
Pt called with vaccine updates, had Tdap and Shingles vaccine.  Marissa Diaz was told needed those 2 vaccines, wonders if still needs the Shingles vaccine had 8/9/2012

## 2022-02-22 DIAGNOSIS — F41.9 ANXIETY: ICD-10-CM

## 2022-02-22 NOTE — TELEPHONE ENCOUNTER
Glenn Curtis is requesting a refill on the following medication(s):  Requested Prescriptions     Pending Prescriptions Disp Refills    escitalopram (LEXAPRO) 10 MG tablet [Pharmacy Med Name: ESCITALOPRAM 10 MG TABLET] 30 tablet 5     Sig: take 1 tablet by mouth once daily       Last Visit Date (If Applicable):  5/78/6069    Next Visit Date:    7/21/2022

## 2022-02-23 RX ORDER — ESCITALOPRAM OXALATE 10 MG/1
TABLET ORAL
Qty: 30 TABLET | Refills: 5 | Status: SHIPPED | OUTPATIENT
Start: 2022-02-23 | End: 2022-08-26

## 2022-04-07 RX ORDER — LEVOTHYROXINE SODIUM 0.03 MG/1
25 TABLET ORAL DAILY
Qty: 30 TABLET | Refills: 5 | Status: SHIPPED | OUTPATIENT
Start: 2022-04-07 | End: 2022-10-03

## 2022-04-07 RX ORDER — LOSARTAN POTASSIUM AND HYDROCHLOROTHIAZIDE 12.5; 5 MG/1; MG/1
1 TABLET ORAL DAILY
Qty: 30 TABLET | Refills: 5 | Status: SHIPPED | OUTPATIENT
Start: 2022-04-07 | End: 2022-10-08 | Stop reason: SDUPTHER

## 2022-04-07 NOTE — TELEPHONE ENCOUNTER
Surjit See is requesting a refill on the following medication(s):  Requested Prescriptions     Pending Prescriptions Disp Refills    levothyroxine (SYNTHROID) 25 MCG tablet 30 tablet 5     Sig: Take 1 tablet by mouth daily    losartan-hydroCHLOROthiazide (HYZAAR) 50-12.5 MG per tablet 30 tablet 5     Sig: Take 1 tablet by mouth daily       Last Visit Date (If Applicable):  1/71/4009    Next Visit Date:    7/21/2022

## 2022-04-07 NOTE — TELEPHONE ENCOUNTER
----- Message from Charla Aleman sent at 4/7/2022  8:49 AM EDT -----  Subject: Refill Request    QUESTIONS  Name of Medication? Other - losartan-hydroCHLOROthiazide  Patient-reported dosage and instructions? 50-12.5 MG tablet once per day   How many days do you have left? 4  Preferred Pharmacy? [address] phone number (if available)? [phone]  ---------------------------------------------------------------------------  --------------,  Name of Medication? levothyroxine (SYNTHROID) 25 MCG tablet  Patient-reported dosage and instructions? 25MCG Tablet once per day   How many days do you have left? 6  Preferred Pharmacy? [address] phone number (if available)? [phone]  ---------------------------------------------------------------------------  --------------,  Name of Medication? escitalopram (LEXAPRO) 10 MG tablet  Patient-reported dosage and instructions? 10 MG tablet once per day   How many days do you have left? 6  Preferred Pharmacy? [address] phone number (if available)? [phone]  ---------------------------------------------------------------------------  --------------  CALL BACK INFO  What is the best way for the office to contact you? OK to leave message on   voicemail  Preferred Call Back Phone Number? [phone]  ---------------------------------------------------------------------------  --------------  SCRIPT ANSWERS  Relationship to Patient?  Self

## 2022-07-05 RX ORDER — ATORVASTATIN CALCIUM 40 MG/1
40 TABLET, FILM COATED ORAL DAILY
Qty: 90 TABLET | Refills: 3 | Status: SHIPPED | OUTPATIENT
Start: 2022-07-05

## 2022-07-05 NOTE — TELEPHONE ENCOUNTER
Insurance requesting 90 day supply       Stu Velasco is requesting a refill on the following medication(s):  Requested Prescriptions     Pending Prescriptions Disp Refills    atorvastatin (LIPITOR) 40 MG tablet 90 tablet 3     Sig: Take 1 tablet by mouth daily       Last Visit Date (If Applicable):  1/57/5844    Next Visit Date:    7/21/2022

## 2022-07-21 ENCOUNTER — OFFICE VISIT (OUTPATIENT)
Dept: FAMILY MEDICINE CLINIC | Age: 77
End: 2022-07-21
Payer: MEDICARE

## 2022-07-21 VITALS
DIASTOLIC BLOOD PRESSURE: 68 MMHG | BODY MASS INDEX: 22.5 KG/M2 | SYSTOLIC BLOOD PRESSURE: 124 MMHG | OXYGEN SATURATION: 97 % | HEART RATE: 83 BPM | WEIGHT: 123 LBS

## 2022-07-21 DIAGNOSIS — C34.91 SQUAMOUS CELL CARCINOMA OF LUNG, STAGE III, RIGHT (HCC): ICD-10-CM

## 2022-07-21 DIAGNOSIS — Z91.81 AT HIGH RISK FOR FALLS: ICD-10-CM

## 2022-07-21 DIAGNOSIS — N18.31 STAGE 3A CHRONIC KIDNEY DISEASE (HCC): ICD-10-CM

## 2022-07-21 DIAGNOSIS — G89.29 CHRONIC MIDLINE LOW BACK PAIN WITHOUT SCIATICA: ICD-10-CM

## 2022-07-21 DIAGNOSIS — R73.01 IMPAIRED FASTING BLOOD SUGAR: Primary | ICD-10-CM

## 2022-07-21 DIAGNOSIS — M54.50 CHRONIC MIDLINE LOW BACK PAIN WITHOUT SCIATICA: ICD-10-CM

## 2022-07-21 DIAGNOSIS — I71.40 ABDOMINAL AORTIC ANEURYSM (AAA) 3.0 CM TO 5.0 CM IN DIAMETER IN FEMALE: ICD-10-CM

## 2022-07-21 DIAGNOSIS — M25.552 LEFT HIP PAIN: ICD-10-CM

## 2022-07-21 PROBLEM — N18.30 CHRONIC RENAL DISEASE, STAGE III (HCC): Status: ACTIVE | Noted: 2022-07-21

## 2022-07-21 LAB — HBA1C MFR BLD: 6.1 %

## 2022-07-21 PROCEDURE — 99213 OFFICE O/P EST LOW 20 MIN: CPT | Performed by: FAMILY MEDICINE

## 2022-07-21 PROCEDURE — 99214 OFFICE O/P EST MOD 30 MIN: CPT | Performed by: FAMILY MEDICINE

## 2022-07-21 PROCEDURE — 1123F ACP DISCUSS/DSCN MKR DOCD: CPT | Performed by: FAMILY MEDICINE

## 2022-07-21 PROCEDURE — 83036 HEMOGLOBIN GLYCOSYLATED A1C: CPT | Performed by: FAMILY MEDICINE

## 2022-07-21 PROCEDURE — PBSHW POCT GLYCOSYLATED HEMOGLOBIN (HGB A1C): Performed by: FAMILY MEDICINE

## 2022-07-21 SDOH — ECONOMIC STABILITY: FOOD INSECURITY: WITHIN THE PAST 12 MONTHS, YOU WORRIED THAT YOUR FOOD WOULD RUN OUT BEFORE YOU GOT MONEY TO BUY MORE.: NEVER TRUE

## 2022-07-21 SDOH — ECONOMIC STABILITY: FOOD INSECURITY: WITHIN THE PAST 12 MONTHS, THE FOOD YOU BOUGHT JUST DIDN'T LAST AND YOU DIDN'T HAVE MONEY TO GET MORE.: NEVER TRUE

## 2022-07-21 ASSESSMENT — SOCIAL DETERMINANTS OF HEALTH (SDOH): HOW HARD IS IT FOR YOU TO PAY FOR THE VERY BASICS LIKE FOOD, HOUSING, MEDICAL CARE, AND HEATING?: NOT HARD AT ALL

## 2022-07-21 NOTE — PROGRESS NOTES
[address] [facility]  [address]  Dept: [phone]  Dept Z:[phone]    Candis Martinez is a 77 y.o. female who presents today for her medical conditions/complaints as notedbelow. Candis Martinez is c/o of Hypertension (6mo follow up) and Hip Pain (Left hip pain and leg spasms in both thighs)        Assessment/Plan:     1. Impaired fasting blood sugar  -     POCT glycosylated hemoglobin (Hb A1C)  2. Left hip pain  -     XR HIP LEFT (2-3 VIEWS); Future  -     [facility]  3. Stage 3a chronic kidney disease ([address]  4. Chronic midline low back pain without sciatica  -     XR LUMBAR SPINE (2-3 VIEWS); Future  -     [facility]  5. Squamous cell carcinoma of lung, stage III, right (HCC)  -     XR LUMBAR SPINE (2-3 VIEWS); Future  6. At high risk for falls  7. Abdominal aortic aneurysm (AAA) 3.0 cm to 5.0 cm in diameter in female [facility])  -     CTA ABDOMEN W CONTRAST; Future    Can use the ibuprofen-- watch for stomach upset but limit to no more than 2-3 twice per day and can also add in acetaminophen (generic tylenol) 2-3 times per day (2 tablets at the most). Can use the topical spray or rubs as needed. Also try the lidocaine patches with the trips to [facility] or other longer days. You can even use the larger patches and cut in 1/2 to fit the area of pain. Check the xray of the lower back to make sure this is \"just\" arthritis causing the back pain and the leg aching. Will also check the xray of the left hip. Will start therapy -- for the hip and back. X-ray of the hip showed stable hip arthroplasty. X-ray of the lower back showed there was an abdominal aneurysm present. This is a new finding. Has the upcoming CT of the chest scheduled so we will add the CT of the abdomen at the same time to hopefully limit contrast exposure. Findings discussed with mian Leroy.   We will call Javed Reap and discussed with her as well. Lab Results   Component Value Date    WBC 9.0 07/06/2022    HGB 11.2 (L) 07/06/2022    HCT 34.7 (L) 07/06/2022    .6 07/06/2022    CHOL 135 01/20/2022    TRIG 63 01/20/2022    HDL 67 01/20/2022    ALT 19 07/06/2022    AST 29 07/06/2022     07/06/2022    K 3.9 07/06/2022     07/06/2022    CREATININE 1.1 (H) 07/06/2022    BUN 30 (H) 07/06/2022    CO2 26 07/06/2022    TSH 1.62 06/15/2022    INR 1.1 07/15/2021    LABA1C 6.1 07/21/2022    LABA1C 5.8 03/24/2021       Return in about 6 months (around 1/21/2023) for AWV. Subjective:      HPI:     HPI    Tania Godinez has been maintained on Keytruda for her stage IV recurrent squamous cell lung cancer. Her visit with Dr. Chikis Esparza in June he was happy with her status. She has a repeat CT scan of the chest scheduled for August.    Weight has been stable. No change in her appetite or diet. No nausea or changes in her bowels. Breathing hs been OK. With the heat will be a bit harder when she goes from [place] to her car. Has  had more pain in her left hip recently with some spasm. Will go all the way down her outer left hip and at times into her posterior calves. It will improve when she sits for a few minutes. Some lower back -- has increased some. Jacobo when the legs are flaring up. Will have to rest when she is trying to get something done around the house. Has had this for years but it is increasing. Has used some ibuprofen. Using 3 per day usually. Has intermittent spasms. These are in the front of the thighs usually at night -- rest in bed resolves.      BP Readings from Last 3 Encounters:   07/21/22 124/68   01/20/22 136/82   11/18/21 120/82          (goal 120/80)    Wt Readings from Last 3 Encounters:   07/21/22 123 lb (55.8 kg)   01/20/22 126 lb (57.2 kg)   11/18/21 121 lb (54.9 kg)        Past Medical History:   Diagnosis Date    Leukoplakia, gingiva     [facility] 2019 Osteopenia       Past Surgical History:   Procedure Laterality Date    BRONCHOSCOPY  05/02/2019    CATARACT REMOVAL Bilateral     COLONOSCOPY  2009    tubular adenoma with low grade dysplasia     TONSILLECTOMY AND ADENOIDECTOMY      TUBAL LIGATION         Family History   Problem Relation Age of Onset    Cancer Mother         multiple myeloma    Cancer Father         throat cancer    Thyroid Disease Sister     Thyroid Disease Sister     Thyroid Disease Daughter        Social History     Tobacco Use    Smoking status: Former     Packs/day: 0.50     Years: 53.00     Pack years: 26.50     Types: Cigarettes     Quit date: 4/1/2019     Years since quitting: 3.3    Smokeless tobacco: Never    Tobacco comments:     up to 1 ppd   Substance Use Topics    Alcohol use: Not on file      Current Outpatient Medications   Medication Sig Dispense Refill    atorvastatin (LIPITOR) 40 MG tablet Take 1 tablet by mouth daily 90 tablet 3    levothyroxine (SYNTHROID) 25 MCG tablet Take 1 tablet by mouth daily 30 tablet 5    losartan-hydroCHLOROthiazide (HYZAAR) 50-12.5 MG per tablet Take 1 tablet by mouth daily 30 tablet 5    escitalopram (LEXAPRO) 10 MG tablet take 1 tablet by mouth once daily 30 tablet 5    fluticasone (FLONASE) 50 MCG/ACT nasal spray instill 2 spray into each nostril once daily 48 g 3    predniSONE (DELTASONE) 10 MG tablet Take 10 mg by mouth daily Take one tablet every other day      magnesium (MAGNESIUM-OXIDE) 250 MG TABS tablet Take 250 mg by mouth daily      Vit C-Cholecalciferol-Arlette Hip 500-1000-20 MG-UNIT-MG CAPS Take by mouth      Multiple Vitamin (MULTI-VITAMIN DAILY PO) Take by mouth      aspirin 325 MG tablet Take 325 mg by mouth daily      lidocaine-prilocaine (EMLA) 2.5-2.5 % cream apply topically TO PORT SITE 60 TO 90  MINS PRIOR TO TREATMENT ADN COVER WITH PLASTIC WRAP  0    Probiotic Product (PROBIOTIC DAILY PO) Take by mouth      acetaminophen (TYLENOL) 500 MG tablet Take 500 mg by mouth every 6 hours as needed for Pain      ibuprofen (ADVIL;MOTRIN) 200 MG tablet Take 200 mg by mouth every 6 hours as needed for Pain       Ascorbic Acid (VITAMIN C) 500 MG CAPS Vitamin C TABS  Refills: 0  Active      Calcium Carbonate-Vit D-Min (CALCIUM 1200 PO) Take by mouth      HYDROcodone-acetaminophen (NORCO) 5-325 MG per tablet take 1 to 2 tablets by mouth every 4 to 6 hours AS NEEDED FOR PAIN (Patient not taking: Reported on 7/21/2022)       No current facility-administered medications for this visit. No Known Allergies    Health Maintenance   Topic Date Due    Flu vaccine (1) 09/01/2022    Lipids  01/20/2023    Depression Screen  01/20/2023    Annual Wellness Visit (AWV)  01/21/2023    A1C test (Diabetic or Prediabetic)  07/21/2023    Colorectal Cancer Screen  12/23/2029    DTaP/Tdap/Td vaccine (3 - Td or Tdap) 03/11/2031    DEXA (modify frequency per FRAX score)  Completed    Shingles vaccine  Completed    Pneumococcal 65+ years Vaccine  Completed    COVID-19 Vaccine  Completed    Hepatitis C screen  Completed    Hepatitis A vaccine  Aged Out    Hepatitis B vaccine  Aged Out    Hib vaccine  Aged Out    Meningococcal (ACWY) vaccine  Aged Out    Low dose CT lung screening  Discontinued         Review of Systems    Objective:     /68 (Site: Right Upper Arm, Position: Sitting, Cuff Size: Medium Adult)   Pulse 83   Wt 123 lb (55.8 kg)   SpO2 97%   BMI 22.50 kg/m²     Physical Exam  Vitals and nursing note reviewed. Constitutional:       General: She is not in acute distress. Appearance: Normal appearance. She is well-developed. She is not ill-appearing. Comments: Alert and oriented    HENT:      Head: Normocephalic. Right Ear: External ear normal.      Left Ear: External ear normal.      Nose: Nose normal.      Mouth/Throat:      Mouth: Mucous membranes are moist.   Eyes:      General: No scleral icterus.      Conjunctiva/sclera: Conjunctivae normal.      Pupils: Pupils are equal, round, and reactive to light. Neck:      Thyroid: No thyromegaly. Vascular: Carotid bruit (bilateral harsh carotid bruits) present. Cardiovascular:      Rate and Rhythm: Normal rate and regular rhythm. Pulses: Normal pulses. Heart sounds: Normal heart sounds. No murmur heard. Pulmonary:      Effort: Pulmonary effort is normal.      Breath sounds: Normal breath sounds. Musculoskeletal:         General: No deformity. Cervical back: Normal range of motion and neck supple. No muscular tenderness. Back:       Right lower leg: No edema. Left lower leg: No edema. Lymphadenopathy:      Cervical: No cervical adenopathy. Skin:     Capillary Refill: Capillary refill takes less than 2 seconds. Findings: No erythema. Neurological:      General: No focal deficit present. Mental Status: She is alert and oriented to person, place, and time. Cranial Nerves: No cranial nerve deficit. Sensory: No sensory deficit. Comments: Still hard of hearing   Psychiatric:         Mood and Affect: Mood normal.         Behavior: Behavior normal.         Thought Content: Thought content normal.         Judgment: Judgment normal.             Multiple labs and other testing may have been ordered which may not be completely evident from the above note due to system interface incompatibilities. Patient given educational materials - see patientinstructions. Discussed use, benefit, and side effects of prescribed medications. All patient questions answered. Pt voiced understanding. Reviewed health maintenance. Instructed to continue current medications, diet andexercise. Patient agreed with treatment plan. Follow up as directed.      (Please note that portions of this note were completed with a voice-recognition program. Efforts were made to edit the dictation but occasionally words are mis-transcribed.)    Electronically signed by Klaudia Bob MD on 7/21/2022     On the basis of positive falls risk screening, assessment and plan is as follows: home safety tips provided.

## 2022-07-21 NOTE — PATIENT INSTRUCTIONS
Can use the ibuprofen-- watch for stomach upset but limit to no more than 2-3 twice per day and can also add in acetaminophen (generic tylenol) 2-3 times per day (2 tablets at the most). Can use the topical spray or rubs as needed. Also try the lidocaine patches with the trips to [facility] or other longer days. You can even use the larger patches and cut in 1/2 to fit the area of pain. Check the xray of the lower back to make sure this is \"just\" arthritis causing the back pain and the leg aching. Will also check the xray of the left hip. Will start therapy -- for the hip and back.

## 2022-07-29 DIAGNOSIS — I71.40 ABDOMINAL AORTIC ANEURYSM (AAA) 3.0 CM TO 5.0 CM IN DIAMETER IN FEMALE: Primary | ICD-10-CM

## 2022-08-25 DIAGNOSIS — F41.9 ANXIETY: ICD-10-CM

## 2022-08-25 NOTE — TELEPHONE ENCOUNTER
Aime Xavier is requesting a refill on the following medication(s):  Requested Prescriptions     Pending Prescriptions Disp Refills    escitalopram (LEXAPRO) 10 MG tablet [Pharmacy Med Name: ESCITALOPRAM 10 MG TABLET] 30 tablet 5     Sig: take 1 tablet by mouth once daily       Last Visit Date (If Applicable):  5/31/1336    Next Visit Date:    8/29/2022

## 2022-08-26 RX ORDER — ESCITALOPRAM OXALATE 10 MG/1
TABLET ORAL
Qty: 30 TABLET | Refills: 5 | Status: SHIPPED | OUTPATIENT
Start: 2022-08-26 | End: 2022-10-24 | Stop reason: SDUPTHER

## 2022-08-29 ENCOUNTER — OFFICE VISIT (OUTPATIENT)
Dept: FAMILY MEDICINE CLINIC | Age: 77
End: 2022-08-29
Payer: MEDICARE

## 2022-08-29 VITALS
HEART RATE: 92 BPM | OXYGEN SATURATION: 98 % | BODY MASS INDEX: 22.68 KG/M2 | DIASTOLIC BLOOD PRESSURE: 54 MMHG | SYSTOLIC BLOOD PRESSURE: 108 MMHG | WEIGHT: 124 LBS

## 2022-08-29 DIAGNOSIS — I71.40 ABDOMINAL AORTIC ANEURYSM (AAA) 3.0 CM TO 5.0 CM IN DIAMETER IN FEMALE: Primary | ICD-10-CM

## 2022-08-29 PROCEDURE — 99213 OFFICE O/P EST LOW 20 MIN: CPT | Performed by: FAMILY MEDICINE

## 2022-08-29 PROCEDURE — 99211 OFF/OP EST MAY X REQ PHY/QHP: CPT | Performed by: FAMILY MEDICINE

## 2022-08-29 PROCEDURE — 1123F ACP DISCUSS/DSCN MKR DOCD: CPT | Performed by: FAMILY MEDICINE

## 2022-08-29 NOTE — PROGRESS NOTES
[address] [facility]  [address]  Dept: [phone]  Dept XBT:[phone]    Stu Velasco is a 77 y.o. female who presents today for her medical conditions/complaints as notedbelow. Stu Velasco is c/o of Lung Cancer (Review CT results)        Assessment/Plan:     1. Abdominal aortic aneurysm (AAA) 3.0 cm to 5.0 cm in diameter in female [facility])  -     Андрей Borjas MD, Vascular Surgery, [address] [facility] with her oncology follow up. With the 4.5 cm aneurysm would recommend evaluation with the vascular surgeon to see if close follow up vs intervention is warranted at this time with suggestion of ulceration along the aorta on CT. Continue with the statin and good blood pressure control in the interim. Lab Results   Component Value Date    WBC 8.9 08/17/2022    HGB 10.6 (L) 08/17/2022    HCT 33.4 (L) 08/17/2022    .4 08/17/2022    CHOL 135 01/20/2022    TRIG 63 01/20/2022    HDL 67 01/20/2022    ALT 22 08/17/2022    AST 31 08/17/2022     08/17/2022    K 4.5 08/17/2022     08/17/2022    CREATININE 1.0 08/17/2022    BUN 19 (H) 08/17/2022    CO2 25 08/17/2022    TSH 1.62 06/15/2022    INR 1.1 07/15/2021    LABA1C 6.1 07/21/2022    LABA1C 5.8 03/24/2021       Return for As scheduled. Subjective:      HPI:     HPI    Lesli Forman had been having significant low back pain. An x-ray of the low back done in July showed a 4.6 cm abdominal aortic aneurysm. Because of this a CTA of the abdomen and pelvis was performed. She is here today to review those results. CT performed on 8/22 showed:     Stable mass-like density in the right apex extending to the right hilum along the mediastinum,    possibly fibrosis and scarring. 2.  Severe atherosclerotic disease of the entire aorta with multiple penetrating ulcers. 3.  Infrarenal 4.5 cm saccular aneurysm.   This has increased in size since the prior CT of the    abdomen and pelvis. 4.  Advanced thoracolumbar degenerative scoliosis with multiple mild chronic appearing endplate    compression fractures involving several vertebrae at the thoracolumbar junction.          BP Readings from Last 3 Encounters:   08/29/22 (!) 108/54   07/21/22 124/68   01/20/22 136/82          (goal 120/80)    Wt Readings from Last 3 Encounters:   08/29/22 124 lb (56.2 kg)   07/21/22 123 lb (55.8 kg)   01/20/22 126 lb (57.2 kg)        Past Medical History:   Diagnosis Date    Leukoplakia, gingiva     Lung cancer, upper lobe ([address] 2019    Osteopenia       Past Surgical History:   Procedure Laterality Date    BRONCHOSCOPY  05/02/2019    CATARACT REMOVAL Bilateral     COLONOSCOPY  2009    tubular adenoma with low grade dysplasia     TONSILLECTOMY AND ADENOIDECTOMY      TUBAL LIGATION         Family History   Problem Relation Age of Onset    Cancer Mother         multiple myeloma    Cancer Father         throat cancer    Thyroid Disease Sister     Thyroid Disease Sister     Thyroid Disease Daughter        Social History     Tobacco Use    Smoking status: Former     Packs/day: 0.50     Years: 53.00     Pack years: 26.50     Types: Cigarettes     Quit date: 4/1/2019     Years since quitting: 3.4    Smokeless tobacco: Never    Tobacco comments:     up to 1 ppd   Substance Use Topics    Alcohol use: Not on file      Current Outpatient Medications   Medication Sig Dispense Refill    escitalopram (LEXAPRO) 10 MG tablet take 1 tablet by mouth once daily 30 tablet 5    atorvastatin (LIPITOR) 40 MG tablet Take 1 tablet by mouth daily 90 tablet 3    levothyroxine (SYNTHROID) 25 MCG tablet Take 1 tablet by mouth daily 30 tablet 5    losartan-hydroCHLOROthiazide (HYZAAR) 50-12.5 MG per tablet Take 1 tablet by mouth daily 30 tablet 5    fluticasone (FLONASE) 50 MCG/ACT nasal spray instill 2 spray into each nostril once daily 48 g 3    predniSONE (DELTASONE) 10 MG tablet Take 10 mg by mouth daily Take one tablet every other day      magnesium (MAGNESIUM-OXIDE) 250 MG TABS tablet Take 250 mg by mouth daily      Vit C-Cholecalciferol-Arlette Hip 500-1000-20 MG-UNIT-MG CAPS Take by mouth      Multiple Vitamin (MULTI-VITAMIN DAILY PO) Take by mouth      aspirin 325 MG tablet Take 325 mg by mouth daily      lidocaine-prilocaine (EMLA) 2.5-2.5 % cream apply topically TO PORT SITE 60 TO 90  MINS PRIOR TO TREATMENT ADN COVER WITH PLASTIC WRAP  0    Probiotic Product (PROBIOTIC DAILY PO) Take by mouth      acetaminophen (TYLENOL) 500 MG tablet Take 500 mg by mouth every 6 hours as needed for Pain      ibuprofen (ADVIL;MOTRIN) 200 MG tablet Take 200 mg by mouth every 6 hours as needed for Pain       Ascorbic Acid (VITAMIN C) 500 MG CAPS Vitamin C TABS  Refills: 0  Active      Calcium Carbonate-Vit D-Min (CALCIUM 1200 PO) Take by mouth       No current facility-administered medications for this visit. No Known Allergies    Health Maintenance   Topic Date Due    Flu vaccine (1) 09/01/2022    Lipids  01/20/2023    Depression Screen  01/20/2023    Annual Wellness Visit (AWV)  01/21/2023    A1C test (Diabetic or Prediabetic)  07/21/2023    Colorectal Cancer Screen  12/23/2029    DTaP/Tdap/Td vaccine (3 - Td or Tdap) 03/11/2031    DEXA (modify frequency per FRAX score)  Completed    Shingles vaccine  Completed    Pneumococcal 65+ years Vaccine  Completed    COVID-19 Vaccine  Completed    Hepatitis C screen  Completed    Hepatitis A vaccine  Aged Out    Hepatitis B vaccine  Aged Out    Hib vaccine  Aged Out    Meningococcal (ACWY) vaccine  Aged Out    Low dose CT lung screening  Discontinued         Review of Systems    Objective:     BP (!) 108/54 (Site: Left Upper Arm, Position: Sitting, Cuff Size: Medium Adult)   Pulse 92   Wt 124 lb (56.2 kg)   SpO2 98%   BMI 22.68 kg/m²     Physical Exam  Vitals and nursing note reviewed. Constitutional:       Appearance: Normal appearance.    Cardiovascular:      Rate and Rhythm: Normal rate and regular rhythm. Pulses: Normal pulses. Heart sounds: Normal heart sounds. Pulmonary:      Effort: Pulmonary effort is normal.      Breath sounds: Normal breath sounds. Musculoskeletal:      Cervical back: Normal range of motion and neck supple. No tenderness. Right lower leg: No edema. Left lower leg: No edema. Lymphadenopathy:      Cervical: No cervical adenopathy. Neurological:      Mental Status: She is alert. Multiple labs and other testing may have been ordered which may not be completely evident from the above note due to system interface incompatibilities. Patient given educational materials - see patientinstructions. Discussed use, benefit, and side effects of prescribed medications. All patient questions answered. Pt voiced understanding. Reviewed health maintenance. Instructed to continue current medications, diet andexercise. Patient agreed with treatment plan. Follow up as directed.      (Please note that portions of this note were completed with a voice-recognition program. Efforts were made to edit the dictation but occasionally words are mis-transcribed.)    Electronically signed by Toño Duckworth MD on 9/4/2022

## 2022-09-15 ENCOUNTER — OFFICE VISIT (OUTPATIENT)
Dept: FAMILY MEDICINE CLINIC | Age: 77
End: 2022-09-15
Payer: MEDICARE

## 2022-09-15 VITALS
SYSTOLIC BLOOD PRESSURE: 138 MMHG | RESPIRATION RATE: 16 BRPM | OXYGEN SATURATION: 97 % | HEIGHT: 63 IN | TEMPERATURE: 97 F | DIASTOLIC BLOOD PRESSURE: 62 MMHG | HEART RATE: 93 BPM | WEIGHT: 121.2 LBS | BODY MASS INDEX: 21.48 KG/M2

## 2022-09-15 DIAGNOSIS — M54.50 ACUTE RIGHT-SIDED LOW BACK PAIN WITHOUT SCIATICA: Primary | ICD-10-CM

## 2022-09-15 DIAGNOSIS — M51.36 DDD (DEGENERATIVE DISC DISEASE), LUMBAR: ICD-10-CM

## 2022-09-15 PROBLEM — M51.369 DDD (DEGENERATIVE DISC DISEASE), LUMBAR: Status: ACTIVE | Noted: 2022-09-15

## 2022-09-15 PROCEDURE — 1123F ACP DISCUSS/DSCN MKR DOCD: CPT | Performed by: FAMILY MEDICINE

## 2022-09-15 PROCEDURE — 99213 OFFICE O/P EST LOW 20 MIN: CPT | Performed by: FAMILY MEDICINE

## 2022-09-15 PROCEDURE — 20552 NJX 1/MLT TRIGGER POINT 1/2: CPT | Performed by: FAMILY MEDICINE

## 2022-09-15 PROCEDURE — 99212 OFFICE O/P EST SF 10 MIN: CPT | Performed by: FAMILY MEDICINE

## 2022-09-15 PROCEDURE — PBSHW PBB SHADOW CHARGE: Performed by: FAMILY MEDICINE

## 2022-09-15 RX ORDER — TRIAMCINOLONE ACETONIDE 40 MG/ML
40 INJECTION, SUSPENSION INTRA-ARTICULAR; INTRAMUSCULAR ONCE
Status: COMPLETED | OUTPATIENT
Start: 2022-09-15 | End: 2022-09-15

## 2022-09-15 RX ORDER — LIDOCAINE HYDROCHLORIDE 10 MG/ML
1 INJECTION, SOLUTION INFILTRATION; PERINEURAL ONCE
Status: COMPLETED | OUTPATIENT
Start: 2022-09-15 | End: 2022-09-15

## 2022-09-15 RX ADMIN — LIDOCAINE HYDROCHLORIDE 1 ML: 10 INJECTION, SOLUTION INFILTRATION; PERINEURAL at 16:22

## 2022-09-15 RX ADMIN — TRIAMCINOLONE ACETONIDE 40 MG: 40 INJECTION, SUSPENSION INTRA-ARTICULAR; INTRAMUSCULAR at 16:23

## 2022-09-15 SDOH — ECONOMIC STABILITY: FOOD INSECURITY: WITHIN THE PAST 12 MONTHS, YOU WORRIED THAT YOUR FOOD WOULD RUN OUT BEFORE YOU GOT MONEY TO BUY MORE.: NEVER TRUE

## 2022-09-15 SDOH — ECONOMIC STABILITY: FOOD INSECURITY: WITHIN THE PAST 12 MONTHS, THE FOOD YOU BOUGHT JUST DIDN'T LAST AND YOU DIDN'T HAVE MONEY TO GET MORE.: NEVER TRUE

## 2022-09-15 ASSESSMENT — PATIENT HEALTH QUESTIONNAIRE - PHQ9
1. LITTLE INTEREST OR PLEASURE IN DOING THINGS: 0
SUM OF ALL RESPONSES TO PHQ QUESTIONS 1-9: 0
SUM OF ALL RESPONSES TO PHQ QUESTIONS 1-9: 0
SUM OF ALL RESPONSES TO PHQ9 QUESTIONS 1 & 2: 0
SUM OF ALL RESPONSES TO PHQ QUESTIONS 1-9: 0
2. FEELING DOWN, DEPRESSED OR HOPELESS: 0
SUM OF ALL RESPONSES TO PHQ QUESTIONS 1-9: 0

## 2022-09-15 ASSESSMENT — ENCOUNTER SYMPTOMS: BACK PAIN: 1

## 2022-09-15 ASSESSMENT — SOCIAL DETERMINANTS OF HEALTH (SDOH): HOW HARD IS IT FOR YOU TO PAY FOR THE VERY BASICS LIKE FOOD, HOUSING, MEDICAL CARE, AND HEATING?: NOT HARD AT ALL

## 2022-09-15 NOTE — PROGRESS NOTES
Lacey Leos (:  1945) is a 77 y.o. female,Established patient, here for evaluation of the following chief complaint(s):  Hip Pain (Sx started 2022. Right leg goes numb, has spasms. Pain is so bad that she vomits. She takes PT for left hip s/p hip replacement done one year ago. She rates the pain a 4 currently. )         ASSESSMENT/PLAN:  1. Acute right-sided low back pain without sciatica  Comments:  Has Norco at home to use. Heat. Continue PT. Cortisone trigger point injection. Unable to use NSAID due to CKD. 2. DDD (degenerative disc disease), lumbar  Comments:  Tylenol PRN. Heat. Rest.      Return in about 2 weeks (around 2022) for hip pain with PCP. Subjective   SUBJECTIVE/OBJECTIVE:  Patient states that she began to have right hip pain that worsened over the past 2 to 3 days. It is worse when she sits for a long time and then goes to stand up although walking does not really seem to aggravate it. She has a history of osteoarthritis with a left hip replacement several months ago; she is in physical therapy at this time. She denies any recent injury to the right hip or low back area. There is no radiation of the pain although she has some mild intermittent numbness of the right anterior thigh. She is on prednisone every other day due to weight loss and has a history of CKD 3. She states that heat is helpful. She also has a history of lung cancer. An x-ray of the lumbosacral spine on 2022 showed degenerative disc disease and arthritic changes. She has had no fevers. Review of Systems   Constitutional:  Negative for fever. Genitourinary:  Negative for difficulty urinating. Musculoskeletal:  Positive for arthralgias and back pain. Skin:  Negative for rash. Neurological:  Positive for numbness. Objective   Physical Exam  Constitutional:       General: She is not in acute distress. HENT:      Head: Normocephalic.    Eyes:      Extraocular Movements: Extraocular movements intact. Pupils: Pupils are equal, round, and reactive to light. Cardiovascular:      Rate and Rhythm: Normal rate and regular rhythm. Heart sounds: No murmur heard. Pulmonary:      Effort: Pulmonary effort is normal.      Breath sounds: Normal breath sounds. Musculoskeletal:         General: No swelling or signs of injury. Cervical back: Neck supple. Comments: Exam shows no tenderness over the hip joint nor is there redness or swelling. She ambulates normally except for somewhat of a tilt to her pelvis. She is markedly tender over the lateral aspect of her right SI joint reproducing her pain. No masses, rash, or redness is noted. Neurological:      General: No focal deficit present. Mental Status: She is alert and oriented to person, place, and time. Gait: Gait abnormal.   Psychiatric:         Mood and Affect: Mood normal.         Behavior: Behavior normal.        We discussed that she could not use NSAIDs due to her CKD 3. She does have Norco left over from her left hip surgery. We discussed doing a trigger point injection along the right SI joint and she was agreeable. The maximal area of tenderness was found along the lateral aspect of the right SI joint and prepped with Betadine x3 and alcohol. A 25-gauge 2 inch needle was used to inject 1 mL of 1% Xylocaine with 1 mL of Kenalog 40 mg/mL. The patient tolerated the procedure well. Afterwards a Band-Aid was placed. An electronic signature was used to authenticate this note.     --Leobardo Wills MD

## 2022-09-19 ENCOUNTER — INITIAL CONSULT (OUTPATIENT)
Dept: VASCULAR SURGERY | Age: 77
End: 2022-09-19
Payer: MEDICARE

## 2022-09-19 ENCOUNTER — PATIENT MESSAGE (OUTPATIENT)
Dept: FAMILY MEDICINE CLINIC | Age: 77
End: 2022-09-19

## 2022-09-19 ENCOUNTER — TELEPHONE (OUTPATIENT)
Dept: FAMILY MEDICINE CLINIC | Age: 77
End: 2022-09-19

## 2022-09-19 VITALS
BODY MASS INDEX: 21.44 KG/M2 | HEIGHT: 63 IN | DIASTOLIC BLOOD PRESSURE: 64 MMHG | SYSTOLIC BLOOD PRESSURE: 134 MMHG | OXYGEN SATURATION: 97 % | WEIGHT: 121 LBS | HEART RATE: 113 BPM | RESPIRATION RATE: 20 BRPM | TEMPERATURE: 98.2 F

## 2022-09-19 DIAGNOSIS — I71.40 ABDOMINAL AORTIC ANEURYSM WITHOUT RUPTURE: Primary | ICD-10-CM

## 2022-09-19 DIAGNOSIS — M54.16 LUMBAR RADICULOPATHY, RIGHT: ICD-10-CM

## 2022-09-19 PROCEDURE — 99204 OFFICE O/P NEW MOD 45 MIN: CPT | Performed by: SURGERY

## 2022-09-19 PROCEDURE — 1123F ACP DISCUSS/DSCN MKR DOCD: CPT | Performed by: SURGERY

## 2022-09-19 ASSESSMENT — ENCOUNTER SYMPTOMS
EYE PAIN: 0
BACK PAIN: 1
TROUBLE SWALLOWING: 0
CHEST TIGHTNESS: 0
VOMITING: 0
VOICE CHANGE: 0
ABDOMINAL PAIN: 0
ABDOMINAL DISTENTION: 0
COLOR CHANGE: 0
SHORTNESS OF BREATH: 1
COUGH: 0

## 2022-09-19 NOTE — TELEPHONE ENCOUNTER
Can refill the norco if she needs this if it is helping. Can try the tizanidine.  Watch for sedation with this together,  consider if moving up appt if increasing sx

## 2022-09-19 NOTE — TELEPHONE ENCOUNTER
Daughter Gaby Garcia called to inform Dr Jessica Tang that Vascular Surgeon ordered MRI it is scheduled for next Monday 9/26 , he is concerned with her not being mobile and her pain level, [place] would like a call if possible to discuss this [phone]

## 2022-09-19 NOTE — TELEPHONE ENCOUNTER
Daughter notified-would like norco sent in and will check how many tizanidine she has at home and start that. Was also wondering about gabapentin if this would be a better alternative or something else to try with her ongoing issues.  Or if there are any other ideas for help-chiropractor, ortho, etc

## 2022-09-19 NOTE — TELEPHONE ENCOUNTER
Saw Dr Jaguar Mark last week and received Cortisone injection in back. Is not better, getting worse, can hardly walk. Scheduled appt with you 9/26. Anything she can do in meantime? Been taking some Norco she had at home, but almost out. Not really helping with the pain though. Uses [facility]. Has a muscle relaxer at home that she could start if you think it would help. Tizanidine. Lori Bain at [id number] [facility].

## 2022-09-19 NOTE — PROGRESS NOTES
[address] AND VASCULAR  [address] [place] [id number]  Dept: [phone]     Patient: Esperanza Snow  : 1945  MRN: [id number]  DOS: 2022    Referring provider:  Aparna Whelan MD         HPI:  Esperanza Snow is a 77 y.o. female who comes to the office for the first time regarding an abdominal aortic aneurysm. She had a plain x-ray of her spine and hip recently which showed calcifications in the aorta and possible aneurysmal dilatation. She had a CTA done following that revealing a heavily calcified aorta at all levels with a 4 to 4-1/2 cm infrarenal abdominal aortic aneurysm which is in fact fusiform. She has never had mid back or abdominal pain. She has several other diagnoses. She had her left hip repaired within the last several years. She has stage IIIb lung cancer in the right upper lobe with 5 positive nodes in the mediastinum which was treated with radiation and chemotherapy and now a new immunotherapy regimen. She has had 1 episode of recurrence on PET CT but this has remained in remission over the last several years since . She is not on oxygen but does have COPD. Her lung cancer is most likely from a long history of smoking. She is not diabetic. She does not have significant high blood pressure. She has never had a stroke. She does not complain of claudication. She has significant right upper buttock and right lower back pain. This is been going on for approximately 2 to 4 weeks. It has worsened. She had an injection of cortisone in the hip which did not help. She has trouble finding a comfortable place in terms of sitting and or standing. Sitting is the worst.  Standing is also significant. She has the best luck with reduction in symptoms in the laying position. Even then it is quite painful. She has not had MRI of the lumbar spine. She does not complain of significant groin pain.   She does not have pain on the greater trochanter or the iliac spine area. Past Medical History:   Diagnosis Date    Leukoplakia, gingiva     Lung cancer, upper lobe ([address] 2019    Osteopenia      Family History   Problem Relation Age of Onset    Cancer Mother         multiple myeloma    Cancer Father         throat cancer    Thyroid Disease Sister     Thyroid Disease Sister     Thyroid Disease Daughter       Social History     Socioeconomic History    Marital status:      Spouse name: Not on file    Number of children: Not on file    Years of education: Not on file    Highest education level: Not on file   Occupational History    Not on file   Tobacco Use    Smoking status: Former     Packs/day: 0.50     Years: 53.00     Pack years: 26.50     Types: Cigarettes     Quit date: 4/1/2019     Years since quitting: 3.4    Smokeless tobacco: Never    Tobacco comments:     up to 1 ppd   Substance and Sexual Activity    Alcohol use: Not on file    Drug use: Not on file    Sexual activity: Not on file   Other Topics Concern    Not on file   Social History Narrative    Not on file     Social Determinants of Health     Financial Resource Strain: Low Risk     Difficulty of Paying Living Expenses: Not hard at all   Food Insecurity: No Food Insecurity    Worried About [facility] in the Last Year: Never true    [facility] in the Last Year: Never true   Transportation Needs: No Transportation Needs    Lack of Transportation (Medical): No    Lack of Transportation (Non-Medical):  No   Physical Activity: Not on file   Stress: Not on file   Social Connections: Not on file   Intimate Partner Violence: Not on file   Housing Stability: Not on file      Past Surgical History:   Procedure Laterality Date    BRONCHOSCOPY  05/02/2019    CATARACT REMOVAL Bilateral     COLONOSCOPY  2009    tubular adenoma with low grade dysplasia     TONSILLECTOMY AND ADENOIDECTOMY      TUBAL LIGATION        Review of Systems   Constitutional: Negative for activity change, fever and unexpected weight change. HENT:  Negative for trouble swallowing and voice change. Eyes:  Negative for pain and visual disturbance. Respiratory:  Positive for shortness of breath. Negative for cough and chest tightness. Cardiovascular:  Negative for chest pain and palpitations. Gastrointestinal:  Negative for abdominal distention, abdominal pain and vomiting. Endocrine: Negative for cold intolerance and heat intolerance. Genitourinary:  Negative for dysuria, flank pain and hematuria. Musculoskeletal:  Positive for back pain. Negative for joint swelling and neck pain. Skin:  Negative for color change and rash. Allergic/Immunologic: Negative for immunocompromised state. Neurological:  Negative for syncope, speech difficulty, weakness, numbness and headaches. Hematological:  Negative for adenopathy. Psychiatric/Behavioral:  Negative for behavioral problems and suicidal ideas. Vitals:    09/19/22 0912   BP: 134/64   Site: Left Upper Arm   Position: Sitting   Cuff Size: Medium Adult   Pulse: (!) 113   Resp: 20   Temp: 98.2 °F (36.8 °C)   TempSrc: Temporal   SpO2: 97%   Weight: 121 lb (54.9 kg)   Height: 5' 3\" (1.6 m)          Physical Exam  Constitutional:       General: She is not in acute distress. HENT:      Mouth/Throat:      Mouth: Mucous membranes are moist.      Pharynx: Oropharynx is clear. Eyes:      General: No scleral icterus. Extraocular Movements: Extraocular movements intact. Conjunctiva/sclera: Conjunctivae normal.   Neck:      Thyroid: No thyroid mass or thyromegaly. Cardiovascular:      Rate and Rhythm: Normal rate and regular rhythm. Heart sounds: No murmur heard. Comments: Her aorta is palpable but not tender. It is not very large. She has palpable femoral pulses bilaterally. She has weakly palpable popliteal pulses and weakly palpable dorsalis pedis pulses.   She is slightly tacky due to her discomfort today.  She cannot find a comfortable position to be in. She is fidgety and rigid in terms of her posterior trying to splint from her right buttock/back pain. She has no ulceration. She has no gangrene. She has no dependent rubor. She has no varicosities. She has no edema. Pulmonary:      Effort: No respiratory distress. Breath sounds: No rales. Abdominal:      General: There is no distension. Palpations: There is no mass. Tenderness: There is no abdominal tenderness. There is no guarding. Musculoskeletal:      Cervical back: No rigidity or tenderness. Lymphadenopathy:      Cervical: No cervical adenopathy. Skin:     Coloration: Skin is not jaundiced. Findings: No rash. Neurological:      General: No focal deficit present. Mental Status: She is alert and oriented to person, place, and time. Cranial Nerves: No cranial nerve deficit. Psychiatric:         Mood and Affect: Mood normal.       Assessment:  1. Abdominal aortic aneurysm without rupture ([address]    2. Lumbar radiculopathy, right          Plan: At this time I think the best option is to go ahead with an MRI of the lumbar spine. I will see her back in 2 weeks to follow-up with that film. As far as the aneurysm is concerned there is no need for repair at this time as it is not symptomatic and she has a size that is below the threshold of repair for women currently. We will follow this with duplex ultrasonography in 6 months to a year to make sure that it is not growing rapidly and is not in need of repair.     Electronically signed by:  Kamla Romo MD

## 2022-09-20 DIAGNOSIS — M51.36 DDD (DEGENERATIVE DISC DISEASE), LUMBAR: Primary | ICD-10-CM

## 2022-09-20 RX ORDER — HYDROCODONE BITARTRATE AND ACETAMINOPHEN 5; 325 MG/1; MG/1
1 TABLET ORAL EVERY 6 HOURS PRN
Qty: 30 TABLET | Refills: 0 | Status: SHIPPED | OUTPATIENT
Start: 2022-09-20 | End: 2022-09-26 | Stop reason: SDUPTHER

## 2022-09-20 NOTE — PROGRESS NOTES
Please let daughter know that I sent in a prescription for Norco for this patient please also inform them that this could cause increased sedation if they are using this with the muscle relaxer tizanidine.   Thank you

## 2022-09-20 NOTE — TELEPHONE ENCOUNTER
From: Oniel Kirkland  To: Dr. Monserrat Castro: 9/19/2022 5:12 PM EDT  Subject: Medication refill    Hi - I realize this may not get read until tomorrow (Tuesday), but I noticed in the notes that Dr. Letty Méndez agreed to a refill of Norco. Possibly another medication to try for the pain? We called [facility] and they informed they do not have a new script. Also, wondering if this finding could be the causing all of this pain? 4. Advanced thoracolumbar degenerative scoliosis with multiple mild chronic appearing endplate   compression fractures involving several vertebrae at the thoracolumbar junction. If someone is able to call me back re the prescription, would be appreciated.      Thank you,  Osei Teresa (Kaylan's daughter)  [phone] (cell)

## 2022-09-20 NOTE — TELEPHONE ENCOUNTER
Daughter calling back today and states norco was never sent in and Ada Biggs is really needing this-daughter is very upset and saying this should have been addressed yesterday. Informed her messages can take up to 3 days to be addressed and this was mostly taken care of yesterday.  She is requesting meds be sent in today-informed her Dr Tashia Beaver is off on Tuesdays and will send info to on call provider

## 2022-09-26 ENCOUNTER — OFFICE VISIT (OUTPATIENT)
Dept: FAMILY MEDICINE CLINIC | Age: 77
End: 2022-09-26
Payer: MEDICARE

## 2022-09-26 ENCOUNTER — HOSPITAL ENCOUNTER (OUTPATIENT)
Dept: MRI IMAGING | Age: 77
Discharge: HOME | End: 2022-09-28
Payer: MEDICARE

## 2022-09-26 VITALS
DIASTOLIC BLOOD PRESSURE: 64 MMHG | BODY MASS INDEX: 20.19 KG/M2 | WEIGHT: 114 LBS | SYSTOLIC BLOOD PRESSURE: 110 MMHG | OXYGEN SATURATION: 98 % | HEART RATE: 104 BPM

## 2022-09-26 DIAGNOSIS — M54.41 ACUTE BILATERAL LOW BACK PAIN WITH BILATERAL SCIATICA: Primary | ICD-10-CM

## 2022-09-26 DIAGNOSIS — M54.16 LUMBAR RADICULOPATHY, RIGHT: ICD-10-CM

## 2022-09-26 DIAGNOSIS — M54.42 ACUTE BILATERAL LOW BACK PAIN WITH BILATERAL SCIATICA: Primary | ICD-10-CM

## 2022-09-26 DIAGNOSIS — M51.36 DDD (DEGENERATIVE DISC DISEASE), LUMBAR: ICD-10-CM

## 2022-09-26 PROCEDURE — 99213 OFFICE O/P EST LOW 20 MIN: CPT | Performed by: FAMILY MEDICINE

## 2022-09-26 PROCEDURE — 72148 MRI LUMBAR SPINE W/O DYE: CPT

## 2022-09-26 PROCEDURE — 1123F ACP DISCUSS/DSCN MKR DOCD: CPT | Performed by: FAMILY MEDICINE

## 2022-09-26 PROCEDURE — 99214 OFFICE O/P EST MOD 30 MIN: CPT | Performed by: FAMILY MEDICINE

## 2022-09-26 RX ORDER — HYDROCODONE BITARTRATE AND ACETAMINOPHEN 5; 325 MG/1; MG/1
1 TABLET ORAL EVERY 6 HOURS PRN
Qty: 45 TABLET | Refills: 0 | Status: SHIPPED | OUTPATIENT
Start: 2022-09-26 | End: 2022-10-26

## 2022-09-26 RX ORDER — GABAPENTIN 100 MG/1
100 CAPSULE ORAL 3 TIMES DAILY
Qty: 90 CAPSULE | Refills: 1 | Status: SHIPPED | OUTPATIENT
Start: 2022-09-26 | End: 2022-10-24 | Stop reason: SDUPTHER

## 2022-09-26 NOTE — PATIENT INSTRUCTIONS
Start With 100 mg of the gabapentin at bedtime for a few days and then can increase to 200 mg at bedtime. If not helping in the day can increase to 200 mg at bedtime and 100 mg in the morning. Gradually increase up to 300 mg 3 times daily if needed.      Continue with the sennakot, the benefiber and the probiotic for the bowels regularity with the norco and the gabapentin

## 2022-09-26 NOTE — PROGRESS NOTES
[facility]  [address]  Dept: [phone]  Dept F:[phone]    Sole Novoa is a 69 y.o. female who presents today for her medical conditions/complaints as notedbelow. Sole Novoa is c/o of Back Pain (Lower back pain- has been going on for awhile and it not getting any better. )        Assessment/Plan:     1. Acute bilateral low back pain with bilateral sciatica  2. DDD (degenerative disc disease), lumbar  -     HYDROcodone-acetaminophen (NORCO) 5-325 MG per tablet; Take 1 tablet by mouth every 6 hours as needed for Pain for up to 30 days. , Disp-45 tablet, R-0Normal  -     gabapentin (NEURONTIN) 100 MG capsule; Take 1 capsule by mouth 3 times daily for 180 days. May increase up to 3 tabs 3 times if needed, Disp-90 capsule, R-1Normal    On exam back pain is most consistent with arthritis with sciatica. With her history of the cancer further imaging is indicated to verify. Would refer to pain management for injections if pain is not controlled with the conservative therapies. At this time she is tolerating the hydrocodone well can use this with the Tylenol until the pain starts to ease. Start With 100 mg of the gabapentin at bedtime for a few days and then can increase to 200 mg at bedtime. If not helping in the day can increase to 200 mg at bedtime and 100 mg in the morning. Gradually increase up to 300 mg 3 times daily if needed.      Continue with the sennakot, the benefiber and the probiotic for the bowels regularity with the norco and the gabapentin      Lab Results   Component Value Date    WBC 15.0 (H) 09/28/2022    HGB 11.6 (L) 09/28/2022    HCT 38.6 09/28/2022    .9 09/28/2022    CHOL 135 01/20/2022    TRIG 63 01/20/2022    HDL 67 01/20/2022    ALT 50 (H) 09/28/2022    AST 68 (H) 09/28/2022     (L) 09/28/2022    K 3.7 09/28/2022     09/28/2022    CREATININE 1.4 (H) 09/28/2022    BUN 31 (H) 09/28/2022 CO2 18 (L) 09/28/2022    TSH 2.77 09/28/2022    INR 1.1 07/15/2021    LABA1C 6.1 07/21/2022    LABA1C 5.8 03/24/2021       Return in about 4 weeks (around 10/24/2022). Subjective:      HPI:     HPI    Had started PT for some pain on the left lower back. Then the pain on the right side really started up. Severe. Nothing she can recall that started it. The Norco has been helpful. Will last only 4-6 hours. Sharp right in the right buttock-- around into the anterior thighs and the groins. Thighs are a deep ache. When she lays down on the couch it feels better and the heat on the right lower back feels better also. First few days could not get out of the chair without someone helping her get up. The cortisone injection seems like may have helped a little now     Appetite is not as good with the pain. When starts to think about eating the pain makes her nauseated. Also goes out to eat a lot but doesn't want to drive with the norco.    Recent cmp was unremarkable    Family has also noted that she is breathing harder-- pursed lip-- at times seems pain related. Jacobo when she walks from one end of the room to the other. Seems like it is getting more often than it was.  Also seemed more SOB when she was walking when she would go shopping for exam.     BP Readings from Last 3 Encounters:   09/26/22 110/64   09/19/22 134/64   09/15/22 138/62          (goal 120/80)    Wt Readings from Last 3 Encounters:   09/26/22 114 lb (51.7 kg)   09/19/22 121 lb (54.9 kg)   09/15/22 121 lb 3.2 oz (55 kg)        Past Medical History:   Diagnosis Date    Leukoplakia, gingiva     Lung cancer, upper lobe ([address] 2019    Osteopenia       Past Surgical History:   Procedure Laterality Date    BRONCHOSCOPY  05/02/2019    CATARACT REMOVAL Bilateral     COLONOSCOPY  2009    tubular adenoma with low grade dysplasia     TONSILLECTOMY AND ADENOIDECTOMY      TUBAL LIGATION         Family History   Problem Relation Age of Onset    Cancer Mother multiple myeloma    Cancer Father         throat cancer    Thyroid Disease Sister     Thyroid Disease Sister     Thyroid Disease Daughter        Social History     Tobacco Use    Smoking status: Former     Packs/day: 0.50     Years: 53.00     Pack years: 26.50     Types: Cigarettes     Quit date: 4/1/2019     Years since quitting: 3.5    Smokeless tobacco: Never    Tobacco comments:     up to 1 ppd   Substance Use Topics    Alcohol use: Not on file      Current Outpatient Medications   Medication Sig Dispense Refill    HYDROcodone-acetaminophen (NORCO) 5-325 MG per tablet Take 1 tablet by mouth every 6 hours as needed for Pain for up to 30 days. 45 tablet 0    gabapentin (NEURONTIN) 100 MG capsule Take 1 capsule by mouth 3 times daily for 180 days.  May increase up to 3 tabs 3 times if needed 90 capsule 1    LIDOCAINE-MENTHOL, SPRAY, EX Apply topically      escitalopram (LEXAPRO) 10 MG tablet take 1 tablet by mouth once daily 30 tablet 5    atorvastatin (LIPITOR) 40 MG tablet Take 1 tablet by mouth daily 90 tablet 3    levothyroxine (SYNTHROID) 25 MCG tablet Take 1 tablet by mouth daily 30 tablet 5    losartan-hydroCHLOROthiazide (HYZAAR) 50-12.5 MG per tablet Take 1 tablet by mouth daily 30 tablet 5    fluticasone (FLONASE) 50 MCG/ACT nasal spray instill 2 spray into each nostril once daily 48 g 3    predniSONE (DELTASONE) 10 MG tablet Take 10 mg by mouth daily Take one tablet every other day      magnesium (MAGNESIUM-OXIDE) 250 MG TABS tablet Take 250 mg by mouth daily      Vit C-Cholecalciferol-Arlette Hip 500-1000-20 MG-UNIT-MG CAPS Take by mouth      Multiple Vitamin (MULTI-VITAMIN DAILY PO) Take by mouth      aspirin 325 MG tablet Take 325 mg by mouth daily      lidocaine-prilocaine (EMLA) 2.5-2.5 % cream apply topically TO PORT SITE 60 TO 90  MINS PRIOR TO TREATMENT ADN COVER WITH PLASTIC WRAP  0    Probiotic Product (PROBIOTIC DAILY PO) Take by mouth      acetaminophen (TYLENOL) 500 MG tablet Take 500 mg by mouth every 6 hours as needed for Pain      ibuprofen (ADVIL;MOTRIN) 200 MG tablet Take 200 mg by mouth every 6 hours as needed for Pain       Ascorbic Acid (VITAMIN C) 500 MG CAPS Vitamin C TABS  Refills: 0  Active      Calcium Carbonate-Vit D-Min (CALCIUM 1200 PO) Take by mouth       No current facility-administered medications for this visit. No Known Allergies    Health Maintenance   Topic Date Due    Flu vaccine (1) 08/01/2022    Lipids  01/20/2023    Annual Wellness Visit (AWV)  01/21/2023    Depression Screen  09/15/2023    DTaP/Tdap/Td vaccine (3 - Td or Tdap) 03/11/2031    DEXA (modify frequency per FRAX score)  Completed    Shingles vaccine  Completed    Pneumococcal 65+ years Vaccine  Completed    COVID-19 Vaccine  Completed    Hepatitis C screen  Completed    Hepatitis A vaccine  Aged Out    Hepatitis B vaccine  Aged Out    Hib vaccine  Aged Out    Meningococcal (ACWY) vaccine  Aged Out    Low dose CT lung screening  Discontinued         Review of Systems    Objective:     /64 (Site: Left Upper Arm, Position: Sitting, Cuff Size: Medium Adult)   Pulse (!) 104   Wt 114 lb (51.7 kg)   SpO2 98%   BMI 20.19 kg/m²     Physical Exam  Vitals and nursing note reviewed. Constitutional:       Appearance: Normal appearance. Cardiovascular:      Rate and Rhythm: Normal rate and regular rhythm. Pulses: Normal pulses. Heart sounds: Normal heart sounds. Pulmonary:      Effort: Pulmonary effort is normal.      Breath sounds: Normal breath sounds. Musculoskeletal:      Cervical back: Normal range of motion and neck supple. No tenderness. Back:       Right lower leg: No edema. Left lower leg: No edema. Lymphadenopathy:      Cervical: No cervical adenopathy. Neurological:      General: No focal deficit present. Mental Status: She is alert and oriented to person, place, and time.    Psychiatric:         Mood and Affect: Mood normal.         Behavior: Behavior normal. Thought Content: Thought content normal.         Judgment: Judgment normal.             Multiple labs and other testing may have been ordered which may not be completely evident from the above note due to system interface incompatibilities. Patient given educational materials - see patientinstructions. Discussed use, benefit, and side effects of prescribed medications. All patient questions answered. Pt voiced understanding. Reviewed health maintenance. Instructed to continue current medications, diet andexercise. Patient agreed with treatment plan. Follow up as directed.      (Please note that portions of this note were completed with a voice-recognition program. Efforts were made to edit the dictation but occasionally words are mis-transcribed.)    Electronically signed by Lewis King MD on 10/2/2022

## 2022-09-29 ENCOUNTER — TELEPHONE (OUTPATIENT)
Dept: FAMILY MEDICINE CLINIC | Age: 77
End: 2022-09-29

## 2022-09-29 DIAGNOSIS — M51.36 DDD (DEGENERATIVE DISC DISEASE), LUMBAR: ICD-10-CM

## 2022-09-29 DIAGNOSIS — S32.030S COMPRESSION FRACTURE OF L3 LUMBAR VERTEBRA, SEQUELA: ICD-10-CM

## 2022-09-29 DIAGNOSIS — M54.50 ACUTE RIGHT-SIDED LOW BACK PAIN WITHOUT SCIATICA: Primary | ICD-10-CM

## 2022-09-30 NOTE — TELEPHONE ENCOUNTER
Bean notified -she will call Dr Arabella Marie office and cancel if they are not needing to see her, she also states since this is now know to be the pinched nerve they will give it another couple weeks with the gabapentin and other remedies and if no relief at all will try pain management.  Will let us know at upcoming follow up 10/24/22

## 2022-09-30 NOTE — TELEPHONE ENCOUNTER
Iliana Del Angel calling to review MRI results, was told if MRI was okay no need to go to follow up with DR Carbajal.  Has not heard and appt is Monday morning would like to know today before we leave ext:[phone]

## 2022-10-03 RX ORDER — LEVOTHYROXINE SODIUM 0.03 MG/1
TABLET ORAL
Qty: 30 TABLET | Refills: 5 | Status: SHIPPED | OUTPATIENT
Start: 2022-10-03 | End: 2022-10-24 | Stop reason: SDUPTHER

## 2022-10-03 NOTE — TELEPHONE ENCOUNTER
Adalberto Castro is requesting a refill on the following medication(s):  Requested Prescriptions     Pending Prescriptions Disp Refills    levothyroxine (SYNTHROID) 25 MCG tablet [Pharmacy Med Name: LEVOTHYROXINE 25 MCG TABLET] 30 tablet 5     Sig: take 1 tablet by mouth once daily       Last Visit Date (If Applicable):  8/70/9198    Next Visit Date:    10/24/2022

## 2022-10-08 RX ORDER — LOSARTAN POTASSIUM AND HYDROCHLOROTHIAZIDE 12.5; 5 MG/1; MG/1
1 TABLET ORAL DAILY
Qty: 30 TABLET | Refills: 5 | Status: SHIPPED | OUTPATIENT
Start: 2022-10-08 | End: 2022-10-24 | Stop reason: SDUPTHER

## 2022-10-08 RX ORDER — GABAPENTIN 300 MG/1
300 CAPSULE ORAL 3 TIMES DAILY
Qty: 90 CAPSULE | Refills: 0 | Status: SHIPPED | OUTPATIENT
Start: 2022-10-08 | End: 2022-10-29 | Stop reason: SDUPTHER

## 2022-10-10 ENCOUNTER — TELEPHONE (OUTPATIENT)
Dept: FAMILY MEDICINE CLINIC | Age: 77
End: 2022-10-10

## 2022-10-20 ENCOUNTER — OFFICE VISIT (OUTPATIENT)
Dept: OTOLARYNGOLOGY | Age: 77
End: 2022-10-20
Payer: MEDICARE

## 2022-10-20 VITALS
SYSTOLIC BLOOD PRESSURE: 126 MMHG | HEIGHT: 63 IN | RESPIRATION RATE: 16 BRPM | DIASTOLIC BLOOD PRESSURE: 64 MMHG | HEART RATE: 93 BPM | OXYGEN SATURATION: 98 % | BODY MASS INDEX: 20.27 KG/M2 | WEIGHT: 114.4 LBS

## 2022-10-20 DIAGNOSIS — H61.23 BILATERAL IMPACTED CERUMEN: ICD-10-CM

## 2022-10-20 DIAGNOSIS — H72.91 TYMPANIC MEMBRANE PERFORATION, RIGHT: ICD-10-CM

## 2022-10-20 DIAGNOSIS — H90.6 MIXED CONDUCTIVE AND SENSORINEURAL HEARING LOSS OF BOTH EARS: Primary | ICD-10-CM

## 2022-10-20 PROCEDURE — 99213 OFFICE O/P EST LOW 20 MIN: CPT | Performed by: OTOLARYNGOLOGY

## 2022-10-20 PROCEDURE — 1123F ACP DISCUSS/DSCN MKR DOCD: CPT | Performed by: OTOLARYNGOLOGY

## 2022-10-20 PROCEDURE — 69210 REMOVE IMPACTED EAR WAX UNI: CPT | Performed by: OTOLARYNGOLOGY

## 2022-10-20 RX ORDER — CIPROFLOXACIN AND DEXAMETHASONE 3; 1 MG/ML; MG/ML
4 SUSPENSION/ DROPS AURICULAR (OTIC) 2 TIMES DAILY
Qty: 1 EACH | Refills: 0 | Status: SHIPPED | OUTPATIENT
Start: 2022-10-20 | End: 2022-10-27

## 2022-10-20 NOTE — PROGRESS NOTES
10/20/2022 2:53 PM EDT  Rakan Quinteros (:  1945) is a 69 y.o. female,New patient, here for evaluation of the following chief complaint(s):  Cerumen Impaction (1 yr ear clean )      ASSESSMENT/PLAN:  1. Mixed conductive and sensorineural hearing loss of both ears    2. Tympanic membrane perforation, right    3. Bilateral impacted cerumen      1. Mixed conductive and sensorineural hearing loss of both ears  2. Tympanic membrane perforation, right  3. Bilateral impacted cerumen    Fu in 2-3 weeks to recheck left ear   Ciprodex AS   Consider repeat audio   Dc [id number]     Put ears under otomicroscope at follow up  Repeat audio after treatment   Discussed etd and possible otosclerosis/tympanosclerosis   No follow-ups on file. SUBJECTIVE/OBJECTIVE:  Ear Problem  74yo woman with a plugged feeling in her left ear. Ears were recently flushed. Is wondering if the left ear has cerumen. Audio 21: Moderate to severe mixed HL AU   Type As tymp AU   % AU     Was noted to have plaque of cerumen deep in the left ear canal that did not respond to hydrogen peroxide. She has been doing hydrogen peroxide soaks for the last few days. Returned for repeat cleaning that was successful. Noted to be fungal in nature. Treated with boric acid x2. States the hearing is much better in her left ear. Less itching. Does not think she needs to pursue hearing aids at this time. Here for ear check. Decreased hearing and drainage AS.     Past Medical History:   Diagnosis Date    Leukoplakia, gingiva     Lung cancer, upper lobe ([address] 2019    Osteopenia      Past Surgical History:   Procedure Laterality Date    BRONCHOSCOPY  2019    CATARACT REMOVAL Bilateral     COLONOSCOPY  2009    tubular adenoma with low grade dysplasia     TONSILLECTOMY AND ADENOIDECTOMY      TUBAL LIGATION       Social History       Tobacco History       Smoking Status  Former Smoker Quit date  2019 Smoking Frequency  0.5 packs/day for 53 years (26.5 pk yrs) Smoking Tobacco Type  Cigarettes      Smokeless Tobacco Use  Never Used      Tobacco Comment  up to 1 ppd              Alcohol History       Alcohol Use Status  Not Asked              Drug Use       Drug Use Status  Not Asked              Sexual Activity       Sexually Active  Not Asked                  Family History   Problem Relation Age of Onset    Cancer Mother         multiple myeloma    Cancer Father         throat cancer    Thyroid Disease Sister     Thyroid Disease Sister     Thyroid Disease Daughter      Current Outpatient Medications   Medication Instructions    acetaminophen (TYLENOL) 500 mg, Oral, EVERY 6 HOURS PRN    Ascorbic Acid (VITAMIN C) 500 MG CAPS Vitamin C TABS  Refills: 0  Active    aspirin 325 mg, Oral, DAILY    atorvastatin (LIPITOR) 40 mg, Oral, DAILY    Calcium Carbonate-Vit D-Min (CALCIUM 1200 PO) Oral    ciprofloxacin-dexamethasone (CIPRODEX) 0.3-0.1 % otic suspension 4 drops, Left Ear, 2 TIMES DAILY    escitalopram (LEXAPRO) 10 MG tablet take 1 tablet by mouth once daily    fluticasone (FLONASE) 50 MCG/ACT nasal spray instill 2 spray into each nostril once daily    gabapentin (NEURONTIN) 100 mg, Oral, 3 TIMES DAILY, May increase up to 3 tabs 3 times if needed    gabapentin (NEURONTIN) 300 mg, Oral, 3 TIMES DAILY, Intended supply: 30 days    HYDROcodone-acetaminophen (NORCO) 5-325 MG per tablet 1 tablet, Oral, EVERY 6 HOURS PRN    ibuprofen (ADVIL;MOTRIN) 200 mg, Oral, EVERY 6 HOURS PRN    levothyroxine (SYNTHROID) 25 MCG tablet take 1 tablet by mouth once daily    LIDOCAINE-MENTHOL, SPRAY, EX Apply externally    lidocaine-prilocaine (EMLA) 2.5-2.5 % cream apply topically TO PORT SITE 60 TO 90  MINS PRIOR TO TREATMENT ADN COVER WITH PLASTIC WRAP    losartan-hydroCHLOROthiazide (HYZAAR) 50-12.5 MG per tablet 1 tablet, Oral, DAILY    magnesium (MAGNESIUM-OXIDE) 250 mg, Oral, DAILY    Multiple Vitamin (MULTI-VITAMIN DAILY PO) Oral    predniSONE (DELTASONE) 10 mg, Oral, DAILY, Take one tablet every other day     Probiotic Product (PROBIOTIC DAILY PO) Oral    Vit C-Cholecalciferol-Arlette Hip 500-1000-20 MG-UNIT-MG CAPS Oral     No Known Allergies    ENT ROS: positive for - hearing changes     General: The patient is found to be alert and normally responsive female with grossly normal hearing, clear voice and normal articulation. Communication is without difficulty. Voice: Clear   Skin: The skin has normal colour and turgor. Face: The facial contour is symmetric at rest and with movement. Ears: The pinnae have normal contours. AD: EAC with cerumen/KO removed with alligators, TM with perforation anterior/inferior with clean and dry edges   AS thickened TM, shallow retraction pocket in pars flaccida, tm perforation with clean edges   AS: EAC with wet cloudy otorrhea, suctioned #5/3, tm thickened  Eye: The ocular movements are full and symmetric, the conjunctiva is unremarkable; sclera are anicteric, pupillary response is symmetric. No nystagmus is found. Nose:   The external nasal contour is normal  The nasal mucosa has a normal appearance. The nasal septum is straight. The turbinates have a normal appearance  The nares are patent without evidence of polyposis   Oral cavity:   The dentition is healthy. The oral mucosa is without lesions;  the tongue is symmetric with full mobility and is without fasciculation. The soft palate is symmetric. The oropharynx is unremarkable. Neck: The neck has a normal contour; no masses are found on palpation    An electronic signature was used to authenticate this note.     --Ashley Myles MD     10/20/2022 2:53 PM EDT

## 2022-10-24 ENCOUNTER — OFFICE VISIT (OUTPATIENT)
Dept: FAMILY MEDICINE CLINIC | Age: 77
End: 2022-10-24
Payer: MEDICARE

## 2022-10-24 VITALS
BODY MASS INDEX: 20.37 KG/M2 | DIASTOLIC BLOOD PRESSURE: 80 MMHG | WEIGHT: 115 LBS | SYSTOLIC BLOOD PRESSURE: 136 MMHG | HEART RATE: 91 BPM | OXYGEN SATURATION: 96 %

## 2022-10-24 DIAGNOSIS — M51.36 DDD (DEGENERATIVE DISC DISEASE), LUMBAR: ICD-10-CM

## 2022-10-24 DIAGNOSIS — Z23 NEED FOR INFLUENZA VACCINATION: ICD-10-CM

## 2022-10-24 DIAGNOSIS — E44.1 MILD PROTEIN-CALORIE MALNUTRITION (HCC): ICD-10-CM

## 2022-10-24 DIAGNOSIS — G89.29 CHRONIC BILATERAL LOW BACK PAIN WITH RIGHT-SIDED SCIATICA: Primary | ICD-10-CM

## 2022-10-24 DIAGNOSIS — F41.9 ANXIETY: ICD-10-CM

## 2022-10-24 DIAGNOSIS — M54.41 CHRONIC BILATERAL LOW BACK PAIN WITH RIGHT-SIDED SCIATICA: Primary | ICD-10-CM

## 2022-10-24 PROBLEM — E46 PROTEIN CALORIE MALNUTRITION (HCC): Status: ACTIVE | Noted: 2022-10-24

## 2022-10-24 PROCEDURE — 90471 IMMUNIZATION ADMIN: CPT | Performed by: FAMILY MEDICINE

## 2022-10-24 PROCEDURE — 99214 OFFICE O/P EST MOD 30 MIN: CPT | Performed by: FAMILY MEDICINE

## 2022-10-24 PROCEDURE — PBSHW INFLUENZA, FLUAD, (AGE 65 Y+), IM, PF, 0.5 ML: Performed by: FAMILY MEDICINE

## 2022-10-24 PROCEDURE — 3078F DIAST BP <80 MM HG: CPT | Performed by: FAMILY MEDICINE

## 2022-10-24 PROCEDURE — 99213 OFFICE O/P EST LOW 20 MIN: CPT | Performed by: FAMILY MEDICINE

## 2022-10-24 PROCEDURE — 3074F SYST BP LT 130 MM HG: CPT | Performed by: FAMILY MEDICINE

## 2022-10-24 PROCEDURE — 1123F ACP DISCUSS/DSCN MKR DOCD: CPT | Performed by: FAMILY MEDICINE

## 2022-10-24 RX ORDER — LOSARTAN POTASSIUM AND HYDROCHLOROTHIAZIDE 12.5; 5 MG/1; MG/1
1 TABLET ORAL DAILY
Qty: 30 TABLET | Refills: 5 | Status: SHIPPED | OUTPATIENT
Start: 2022-10-24

## 2022-10-24 RX ORDER — LEVOTHYROXINE SODIUM 0.03 MG/1
TABLET ORAL
Qty: 90 TABLET | Refills: 3 | Status: SHIPPED | OUTPATIENT
Start: 2022-10-24

## 2022-10-24 RX ORDER — ESCITALOPRAM OXALATE 10 MG/1
TABLET ORAL
Qty: 90 TABLET | Refills: 3 | Status: SHIPPED | OUTPATIENT
Start: 2022-10-24

## 2022-10-24 NOTE — PATIENT INSTRUCTIONS
Can try adding in the advil at this time cautiously. Try 2 over the counter advil in the morning and the 2 advil at bedtime with the tylenol in the middle of the day. Watch for upset stomach, black or bloody stools or bloodin the stools. Take this with food! No more than 8 tylenol in a 24 hour period. Can try adding in an addition 100 mg of gabapentin in the evening and in the morning if the advil does not seem to help.

## 2022-10-24 NOTE — PROGRESS NOTES
[address] [facility]  [address]  Dept: [phone]  Dept AOD:[phone]    Fernando Meza is a 69 y.o. female who presents today for her medical conditions/complaints as notedbelow. Fernando Meza is c/o of Back Pain (4 week follow up - her back pain is not any better.)        Assessment/Plan:     1. Chronic bilateral low back pain with right-sided sciatica  2. DDD (degenerative disc disease), lumbar  3. Mild protein-calorie malnutrition ([address]  4. Anxiety  -     escitalopram (LEXAPRO) 10 MG tablet; take 1 tablet by mouth once daily, Disp-90 tablet, R-3Normal  5. Need for influenza vaccination  -     Influenza, FLUAD, (age 72 y+), IM, PF, 0.5 mL    Can try adding in the advil at this time cautiously. Try 2 over the counter advil in the morning and the 2 advil at bedtime with the tylenol in the middle of the day. Watch for upset stomach, black or bloody stools or bloodin the stools. Take this with food! No more than 8 tylenol in a 24 hour period. Can try adding in an addition 100 mg of gabapentin in the evening and in the morning if the advil does not seem to help. Anxiety appears well controlled. No other changes in the medications at this time in spite of the pain. Continue to monitor the weight closely off of the prednisone    Lab Results   Component Value Date    WBC 11.8 (H) 10/12/2022    HGB 9.3 (L) 10/12/2022    HCT 31.5 (L) 10/12/2022    .3 10/12/2022    CHOL 135 01/20/2022    TRIG 63 01/20/2022    HDL 67 01/20/2022    ALT 26 10/12/2022    AST 39 (H) 10/12/2022     10/12/2022    K 3.7 10/12/2022     10/12/2022    CREATININE 0.8 10/12/2022    BUN 26 (H) 10/12/2022    CO2 27 10/12/2022    TSH 1.48 10/12/2022    INR 1.1 07/15/2021    LABA1C 6.1 07/21/2022    LABA1C 5.8 03/24/2021       Return if symptoms worsen or fail to improve.         Subjective:      HPI:     HPI    Has the back pain persistently -- better with the gabapentin but still present. Has not been taking the norco because makes her drowsy. Pain is only 25% better than it was. The pain is across the lower back. The more she does the more it hurts but cannot necessarily say when it is the worst or the best.  The pain down the front of thighs is bad when she first goes to bed until she gets settled. Does not feel more drowsy now on the gabapentin now but did when she was still taking the norco.  Can hardly  1 spot for more than a couple of minutes due to the pain. Does still use the heat which helps. Is set up with pain management on November 17. Did stop the prednisone last week. Was on this primarily for her poor appetite with her cancer and the weight loss associated. Has been eating much better and has not noticed any change in her pain on vs off of the prednisone.     BP Readings from Last 3 Encounters:   10/24/22 136/80   10/20/22 126/64   09/26/22 110/64          (goal 120/80)    Wt Readings from Last 3 Encounters:   10/24/22 115 lb (52.2 kg)   10/20/22 114 lb 6.4 oz (51.9 kg)   09/26/22 114 lb (51.7 kg)        Past Medical History:   Diagnosis Date    Leukoplakia, gingiva     Lung cancer, upper lobe ([address] 2019    Osteopenia       Past Surgical History:   Procedure Laterality Date    BRONCHOSCOPY  05/02/2019    CATARACT REMOVAL Bilateral     COLONOSCOPY  2009    tubular adenoma with low grade dysplasia     TONSILLECTOMY AND ADENOIDECTOMY      TUBAL LIGATION         Family History   Problem Relation Age of Onset    Cancer Mother         multiple myeloma    Cancer Father         throat cancer    Thyroid Disease Sister     Thyroid Disease Sister     Thyroid Disease Daughter        Social History     Tobacco Use    Smoking status: Former     Packs/day: 0.50     Years: 53.00     Pack years: 26.50     Types: Cigarettes     Quit date: 4/1/2019     Years since quitting: 3.5    Smokeless tobacco: Never    Tobacco comments:     up to 1 ppd Substance Use Topics    Alcohol use: Not on file      Current Outpatient Medications   Medication Sig Dispense Refill    escitalopram (LEXAPRO) 10 MG tablet take 1 tablet by mouth once daily 90 tablet 3    levothyroxine (SYNTHROID) 25 MCG tablet take 1 tablet by mouth once daily 90 tablet 3    losartan-hydroCHLOROthiazide (HYZAAR) 50-12.5 MG per tablet Take 1 tablet by mouth daily 30 tablet 5    gabapentin (NEURONTIN) 300 MG capsule Take 1 capsule by mouth 3 times daily for 30 days. Intended supply: 30 days 90 capsule 0    LIDOCAINE-MENTHOL, SPRAY, EX Apply topically      atorvastatin (LIPITOR) 40 MG tablet Take 1 tablet by mouth daily 90 tablet 3    fluticasone (FLONASE) 50 MCG/ACT nasal spray instill 2 spray into each nostril once daily 48 g 3    magnesium (MAGNESIUM-OXIDE) 250 MG TABS tablet Take 250 mg by mouth daily      Vit C-Cholecalciferol-Arlette Hip 500-1000-20 MG-UNIT-MG CAPS Take by mouth      Multiple Vitamin (MULTI-VITAMIN DAILY PO) Take by mouth      aspirin 325 MG tablet Take 325 mg by mouth daily      lidocaine-prilocaine (EMLA) 2.5-2.5 % cream apply topically TO PORT SITE 60 TO 90  MINS PRIOR TO TREATMENT ADN COVER WITH PLASTIC WRAP  0    Probiotic Product (PROBIOTIC DAILY PO) Take by mouth      acetaminophen (TYLENOL) 500 MG tablet Take 500 mg by mouth every 6 hours as needed for Pain      ibuprofen (ADVIL;MOTRIN) 200 MG tablet Take 200 mg by mouth every 6 hours as needed for Pain       Ascorbic Acid (VITAMIN C) 500 MG CAPS Vitamin C TABS  Refills: 0  Active      Calcium Carbonate-Vit D-Min (CALCIUM 1200 PO) Take by mouth       No current facility-administered medications for this visit.      No Known Allergies    Health Maintenance   Topic Date Due    COVID-19 Vaccine (5 - Booster for Moderna series) 07/30/2022    Lipids  01/20/2023    Annual Wellness Visit (AWV)  01/21/2023    Depression Screen  09/15/2023    DTaP/Tdap/Td vaccine (3 - Td or Tdap) 03/11/2031    DEXA (modify frequency per [facility] score)  Completed    Flu vaccine  Completed    Shingles vaccine  Completed    Pneumococcal 65+ years Vaccine  Completed    Hepatitis C screen  Completed    Hepatitis A vaccine  Aged Out    Hib vaccine  Aged Out    Meningococcal (ACWY) vaccine  Aged Out    Low dose CT lung screening  Discontinued         Review of Systems    Objective:     /80 (Site: Left Upper Arm, Position: Sitting, Cuff Size: Medium Adult)   Pulse 91   Wt 115 lb (52.2 kg)   SpO2 96%   BMI 20.37 kg/m²     Physical Exam  Vitals and nursing note reviewed. Constitutional:       Appearance: Normal appearance. Cardiovascular:      Rate and Rhythm: Normal rate and regular rhythm. Pulses: Normal pulses. Heart sounds: Normal heart sounds. Pulmonary:      Effort: Pulmonary effort is normal.      Breath sounds: Normal breath sounds. Musculoskeletal:         General: No tenderness or deformity. Cervical back: Normal range of motion and neck supple. No tenderness. Back:       Right lower leg: No edema. Left lower leg: No edema. Comments: Can and on heels and toes   Lymphadenopathy:      Cervical: No cervical adenopathy. Neurological:      General: No focal deficit present. Mental Status: She is alert and oriented to person, place, and time. Psychiatric:         Mood and Affect: Mood normal.         Behavior: Behavior normal.         Thought Content: Thought content normal.         Judgment: Judgment normal.             Multiple labs and other testing may have been ordered which may not be completely evident from the above note due to system interface incompatibilities. Patient given educational materials - see patientinstructions. Discussed use, benefit, and side effects of prescribed medications. All patient questions answered. Pt voiced understanding. Reviewed health maintenance. Instructed to continue current medications, diet andexercise. Patient agreed with treatment plan.  Follow up as directed.      (Please note that portions of this note were completed with a voice-recognition program. Efforts were made to edit the dictation but occasionally words are mis-transcribed.)    Electronically signed by Johnna Kim MD on 10/29/2022

## 2022-10-24 NOTE — PROGRESS NOTES
Have you had an allergic reaction to the flu (influenza) shot? NO  Are you allergic to eggs or any component of the flu vaccine? NO  Do you have a history of Guillain-Brighton Syndrome (GBS), which is paralysis after receiving the flu vaccine? NO  Are you feeling well today? YES  Flu vaccine given as ordered. Patient tolerated it well. No questions re: VIS information. After obtaining consent, and per orders of Dr. Soy Rajput, injection of FLU VACCINE given in Left deltoid by Joel Durham LPN. Patient tolerated well. Patient instructed to remain in clinic for 20 minutes afterwards, and to report any adverse reaction immediately.

## 2022-10-28 ENCOUNTER — TELEPHONE (OUTPATIENT)
Dept: FAMILY MEDICINE CLINIC | Age: 77
End: 2022-10-28

## 2022-10-28 NOTE — TELEPHONE ENCOUNTER
Courtesy notification of patient's concern. Caller reports that patient tested positive on 2 home covid tests , has a history of lung cancer since spring 2019, currently on immunotherapy, has mild symptoms of headache and cough, caller states that patient is with her brother at this time, caller asking to page afterhours for covid treatment and to notify of the positive test result for covid, writer informed caller of covid and paxlovid policy after hours that patient will need evaluation in order to be prescribed Paxlovid and that that medication is prescribed 5-7 days after onset of symptoms. Writer informed caller, per policy, that urgent symptoms need to be seen in the ER and if there are mild symptoms they may call office on Monday the next business day.  Caller states that she will call her mother's specialist next

## 2022-10-31 RX ORDER — GABAPENTIN 300 MG/1
300 CAPSULE ORAL 3 TIMES DAILY
Qty: 90 CAPSULE | Refills: 1 | Status: SHIPPED | OUTPATIENT
Start: 2022-10-31 | End: 2022-11-30

## 2022-10-31 NOTE — TELEPHONE ENCOUNTER
Lula Radha is requesting a refill on the following medication(s):  Requested Prescriptions     Pending Prescriptions Disp Refills    gabapentin (NEURONTIN) 300 MG capsule 90 capsule 0     Sig: Take 1 capsule by mouth 3 times daily for 30 days.  Intended supply: 30 days       Last Visit Date (If Applicable):  32/86/5073    Next Visit Date:    1/23/2023

## 2022-10-31 NOTE — TELEPHONE ENCOUNTER
Spoke with Carolina- she thinks that she is doing a little bit better. The cough is better. Carolina lives out of town and only has been in contact with patient via phone/text. Spoke with patient- she said that her symptoms have cleared up. She said she really didin't have any symptoms. She was shocked when her son said that her test was positive. She states she does not know why she took a test. Her son did the test for her. She denies any questions or concerns.

## 2022-11-09 LAB
HCT VFR BLD CALC: 21.7 % (ref 37–47)
HEMOGLOBIN: 7.4 (ref 12–16)

## 2022-11-10 LAB
ANION GAP SERPL CALCULATED.3IONS-SCNC: 5.9 MMOL/L
BASOPHILS %: 0.73 (ref 0–3)
BASOPHILS ABSOLUTE: 0.04 (ref 0–0.3)
BUN BLDV-MCNC: 19 MG/DL (ref 7–17)
CALCIUM SERPL-MCNC: 8.2 MG/DL (ref 8.4–10.2)
CHLORIDE BLD-SCNC: 109 MMOL/L (ref 98–120)
CO2: 22 MMOL/L (ref 22–31)
CREAT SERPL-MCNC: 0.7 MG/DL (ref 0.5–1)
CREATININE CLEARANCE: 37.7
EOSINOPHILS %: 2.34 (ref 0–10)
EOSINOPHILS ABSOLUTE: 0.14 (ref 0–1.1)
GFR CALCULATED: > 60
GLUCOSE: 81 MG/DL (ref 65–105)
HCT VFR BLD CALC: 26.4 % (ref 37–47)
HCT VFR BLD CALC: 28.8 % (ref 37–47)
HCT VFR BLD CALC: 29 % (ref 37–47)
HEMOGLOBIN: 8.7 (ref 12–16)
HEMOGLOBIN: 9.1 (ref 12–16)
HEMOGLOBIN: 9.3 (ref 12–16)
LYMPHOCYTE %: 27.4 (ref 20–51.1)
LYMPHOCYTES ABSOLUTE: 1.62 (ref 1–5.5)
MCH RBC QN AUTO: 29.8 PG (ref 28.5–32.5)
MCHC RBC AUTO-ENTMCNC: 32 G/DL (ref 32–37)
MCV RBC AUTO: 93.1 FL (ref 80–94)
MONOCYTES %: 11.56 (ref 1.7–9.3)
MONOCYTES ABSOLUTE: 0.68 (ref 0.1–1)
NEUTROPHILS %: 57.97 (ref 42.2–75.2)
NEUTROPHILS ABSOLUTE: 3.43 (ref 2–8.1)
PDW BLD-RTO: 14.6 % (ref 8.5–15.5)
PLATELET # BLD: 259.2 THOU/MM3 (ref 130–400)
POTASSIUM SERPL-SCNC: 3.8 MMOL/L (ref 3.6–5)
RBC: 3.11 M/UL (ref 4.2–5.4)
SODIUM BLD-SCNC: 137 MMOL/L (ref 135–145)
WBC: 5.9 THOU/ML3 (ref 4.8–10.8)

## 2022-11-11 LAB
BASOPHILS %: 1.38 (ref 0–3)
BASOPHILS ABSOLUTE: 0.08 (ref 0–0.3)
EOSINOPHILS %: 2.2 (ref 0–10)
EOSINOPHILS ABSOLUTE: 0.13 (ref 0–1.1)
HCT VFR BLD CALC: 24.9 % (ref 37–47)
HEMOGLOBIN: 8.6 (ref 12–16)
IRON SATURATION: 14 % (ref 15–38)
IRON: 31 MG/DL (ref 37–170)
LYMPHOCYTE %: 17.81 (ref 20–51.1)
LYMPHOCYTES ABSOLUTE: 1.08 (ref 1–5.5)
MCH RBC QN AUTO: 31.3 PG (ref 28.5–32.5)
MCHC RBC AUTO-ENTMCNC: 34.5 G/DL (ref 32–37)
MCV RBC AUTO: 90.8 FL (ref 80–94)
MONOCYTES %: 9.69 (ref 1.7–9.3)
MONOCYTES ABSOLUTE: 0.59 (ref 0.1–1)
NEUTROPHILS %: 68.91 (ref 42.2–75.2)
NEUTROPHILS ABSOLUTE: 4.16 (ref 2–8.1)
PDW BLD-RTO: 14.9 % (ref 8.5–15.5)
PLATELET # BLD: 256.8 THOU/MM3 (ref 130–400)
RBC: 2.74 M/UL (ref 4.2–5.4)
TOTAL IRON BINDING CAPACITY: 227 MG/DL (ref 261–497)
WBC: 6 THOU/ML3 (ref 4.8–10.8)

## 2022-11-13 LAB
ANION GAP SERPL CALCULATED.3IONS-SCNC: 8.5 MMOL/L
BASOPHILS %: 1.37 (ref 0–3)
BASOPHILS ABSOLUTE: 0.08 (ref 0–0.3)
BUN BLDV-MCNC: 9 MG/DL (ref 7–17)
CALCIUM SERPL-MCNC: 7.9 MG/DL (ref 8.4–10.2)
CHLORIDE BLD-SCNC: 106 MMOL/L (ref 98–120)
CO2: 25 MMOL/L (ref 22–31)
CREAT SERPL-MCNC: 0.7 MG/DL (ref 0.5–1)
CREATININE CLEARANCE: 37.7
EOSINOPHILS %: 5.39 (ref 0–10)
EOSINOPHILS ABSOLUTE: 0.31 (ref 0–1.1)
GFR CALCULATED: > 60
GLUCOSE: 127 MG/DL (ref 65–105)
HCT VFR BLD CALC: 23.8 % (ref 37–47)
HEMOGLOBIN: 8.1 (ref 12–16)
LYMPHOCYTE %: 16.4 (ref 20–51.1)
LYMPHOCYTES ABSOLUTE: 0.96 (ref 1–5.5)
MCH RBC QN AUTO: 30.8 PG (ref 28.5–32.5)
MCHC RBC AUTO-ENTMCNC: 34 G/DL (ref 32–37)
MCV RBC AUTO: 90.6 FL (ref 80–94)
MONOCYTES %: 15.13 (ref 1.7–9.3)
MONOCYTES ABSOLUTE: 0.88 (ref 0.1–1)
NEUTROPHILS %: 61.7 (ref 42.2–75.2)
NEUTROPHILS ABSOLUTE: 3.6 (ref 2–8.1)
PDW BLD-RTO: 15 % (ref 8.5–15.5)
PLATELET # BLD: 298.1 THOU/MM3 (ref 130–400)
POTASSIUM SERPL-SCNC: 3.5 MMOL/L (ref 3.6–5)
RBC: 2.63 M/UL (ref 4.2–5.4)
SODIUM BLD-SCNC: 136 MMOL/L (ref 135–145)
WBC: 5.8 THOU/ML3 (ref 4.8–10.8)

## 2022-11-14 LAB
HCT VFR BLD CALC: 27 % (ref 37–47)
HEMOGLOBIN: 8.8 (ref 12–16)
IMMATURE RETIC FRACT: 24.7 % (ref 2.7–18.3)
RETIC HEMOGLOBIN: 34.5 PG (ref 28.2–35.7)
RETIC: 2 % (ref 0.5–1.9)
RETICULOCYTE ABSOLUTE COUNT: 0.06 M/UL (ref 0.03–0.08)

## 2022-11-16 ENCOUNTER — TELEPHONE (OUTPATIENT)
Dept: FAMILY MEDICINE CLINIC | Age: 77
End: 2022-11-16

## 2022-11-16 RX ORDER — SUCRALFATE ORAL 1 G/10ML
1 SUSPENSION ORAL 4 TIMES DAILY
Qty: 1200 ML | Refills: 3 | Status: SHIPPED | OUTPATIENT
Start: 2022-11-16

## 2022-11-16 NOTE — TELEPHONE ENCOUNTER
Pts daughter states that pt was ordered Carafate, Pt daughter called around to different pharmacies and the carafate is on backorder. Is there a different medication you would like this substituted for?  You can call katherine back at her cell or ext [phone]

## 2022-11-16 NOTE — TELEPHONE ENCOUNTER
Care Transitions Initial Follow Up Call    Outreach made within 2 business days of discharge: Yes    Patient: Meghann Lance Patient : 1945   MRN: [id number]  Reason for Admission: GI bleed   Discharge Date: 11/15/22        Spoke with: patient     Discharge department/facility: [facility] Interactive Patient Contact:  Was patient able to fill all prescriptions: Yes  Was patient instructed to bring all medications to the follow-up visit: Yes  Is patient taking all medications as directed in the discharge summary?  Yes  Does patient understand their discharge instructions: Yes  Does patient have questions or concerns that need addressed prior to 7-14 day follow up office visit: no    Scheduled appointment with PCP within 7-14 days    Follow Up  Future Appointments   Date Time Provider Ignacio Gracia   2022  3:00 PM Inessa Lew MD [facility]   2022  2:00 PM Amber Landeros MD [facility]   2023 10:40 AM Inessa Lew MD [facility]       Nicolasa Howard LPN

## 2022-11-17 NOTE — TELEPHONE ENCOUNTER
Bibiana Reyes called to let Dr Yuriy Mason know she was decreased to 2 of the tizanidine but she is now cutting that in half as an [place]

## 2022-11-22 ENCOUNTER — TELEPHONE (OUTPATIENT)
Dept: FAMILY MEDICINE CLINIC | Age: 77
End: 2022-11-22

## 2022-11-23 ENCOUNTER — TELEPHONE (OUTPATIENT)
Dept: FAMILY MEDICINE CLINIC | Age: 77
End: 2022-11-23

## 2022-11-23 ENCOUNTER — OFFICE VISIT (OUTPATIENT)
Dept: FAMILY MEDICINE CLINIC | Age: 77
End: 2022-11-23
Payer: MEDICARE

## 2022-11-23 VITALS
HEIGHT: 63 IN | BODY MASS INDEX: 20.91 KG/M2 | HEART RATE: 98 BPM | WEIGHT: 118 LBS | DIASTOLIC BLOOD PRESSURE: 80 MMHG | SYSTOLIC BLOOD PRESSURE: 126 MMHG | OXYGEN SATURATION: 92 %

## 2022-11-23 DIAGNOSIS — E44.0 MODERATE PROTEIN-CALORIE MALNUTRITION (HCC): ICD-10-CM

## 2022-11-23 DIAGNOSIS — E87.5 HYPERKALEMIA: ICD-10-CM

## 2022-11-23 DIAGNOSIS — Z09 HOSPITAL DISCHARGE FOLLOW-UP: ICD-10-CM

## 2022-11-23 DIAGNOSIS — K25.4 GASTRIC EROSION WITH BLEEDING: ICD-10-CM

## 2022-11-23 DIAGNOSIS — R06.02 SHORTNESS OF BREATH: ICD-10-CM

## 2022-11-23 DIAGNOSIS — R60.0 BILATERAL LEG EDEMA: ICD-10-CM

## 2022-11-23 DIAGNOSIS — S22.000A COMPRESSION FRACTURE OF BODY OF THORACIC VERTEBRA (HCC): ICD-10-CM

## 2022-11-23 DIAGNOSIS — R41.0 ACUTE CONFUSION: Primary | ICD-10-CM

## 2022-11-23 DIAGNOSIS — D50.0 BLOOD LOSS ANEMIA: ICD-10-CM

## 2022-11-23 DIAGNOSIS — E87.5 SERUM POTASSIUM ELEVATED: Primary | ICD-10-CM

## 2022-11-23 LAB
AMMONIA: < 9 UMOL/L (ref 9–30)
B-TYPE NATRIURETIC PEPTIDE: 219 PG/ML (ref 0–100)

## 2022-11-23 PROCEDURE — 99215 OFFICE O/P EST HI 40 MIN: CPT | Performed by: FAMILY MEDICINE

## 2022-11-23 PROCEDURE — 1111F DSCHRG MED/CURRENT MED MERGE: CPT | Performed by: FAMILY MEDICINE

## 2022-11-23 PROCEDURE — 1123F ACP DISCUSS/DSCN MKR DOCD: CPT | Performed by: FAMILY MEDICINE

## 2022-11-23 PROCEDURE — 99213 OFFICE O/P EST LOW 20 MIN: CPT | Performed by: FAMILY MEDICINE

## 2022-11-23 PROCEDURE — 3078F DIAST BP <80 MM HG: CPT | Performed by: FAMILY MEDICINE

## 2022-11-23 PROCEDURE — 3074F SYST BP LT 130 MM HG: CPT | Performed by: FAMILY MEDICINE

## 2022-11-23 PROCEDURE — 99417 PROLNG OP E/M EACH 15 MIN: CPT | Performed by: FAMILY MEDICINE

## 2022-11-23 RX ORDER — PANTOPRAZOLE SODIUM 40 MG/1
TABLET, DELAYED RELEASE ORAL
COMMUNITY
Start: 2022-11-15

## 2022-11-23 RX ORDER — HYDROCODONE BITARTRATE AND ACETAMINOPHEN 5; 325 MG/1; MG/1
TABLET ORAL
COMMUNITY
Start: 2022-11-21

## 2022-11-23 RX ORDER — TIZANIDINE 2 MG/1
TABLET ORAL
COMMUNITY
Start: 2022-11-15

## 2022-11-23 NOTE — PATIENT INSTRUCTIONS
Stop the OTC hydration supplements-- boost, ensure or similar or plain water is fine. Stop the zanaflex AND THE [address]- may not sleep as well but less likely to fall and also less likely to be so sleepy. Put the tylenol back in the time spots for pain and the lidocaine patch as needed. CXR today as well-- with the recent new pneumonia and the hospital noted pleural     Blood work to check the ammonia, BNP . Check the urine also to make sure we are on the right track without persistent infection.

## 2022-11-23 NOTE — TELEPHONE ENCOUNTER
Potassium is markedly elevated. Please have Lakshmi hold her magnesium. Make sure she is not drinking Gatorade or similar electrolyte containing products. Make sure she is not taking any over-the-counter supplements that have potassium in them and if so have her stop these. Please have her repeat a potassium/basic metabolic profile on Friday.

## 2022-11-23 NOTE — TELEPHONE ENCOUNTER
Patient has labs drawn by Dr Stan Jc. Potassium was elevated. They would like to know if you will address? Dr Stan Jc is out of the office.    (He is holding her [place] until after the first of the year)

## 2022-11-23 NOTE — TELEPHONE ENCOUNTER
Patient and daughter Kasey notified   Patient's multivitamin has 80mg of potassium. Patient has also been adding liquid IV to her water which contains 370mg of potassium. She was advised to stop both and recheck labs on Friday. Labs ordered.

## 2022-11-23 NOTE — PROGRESS NOTES
Post-Discharge Transitional Care Follow Up    Eddie Moore   YOB: 1945    Date of Office Visit:  11/23/2022  Date of Hospital Admission: 11/8/2022  Date of Hospital Discharge: 11/15    Care management risk score Rising risk (score 2-5) and Complex Care (Scores >=6): No Risk Score On File     Non face to face  following discharge, date last encounter closed (first attempt may have been earlier): 11/16/2022     Call initiated 2 business days of discharge: Yes    ASSESSMENT/PLAN:   Acute confusion  -     Urinalysis with Reflex to Culture; Future  -     Brain Natriuretic Peptide; Future  -     Ammonia; Future  -     XR CHEST (2 VW); Future  Hospital discharge follow-up  -     NE DISCHARGE MEDS RECONCILED W/ CURRENT OUTPATIENT MED LIST  Gastric erosion with bleeding  Blood loss anemia  -     iron sucrose (VENOFER) 20 MG/ML injection; Infuse 10 mLs intravenously every 3 days For a total of 4 days, Disp-10 mL, R-0Normal  Compression fracture of body of thoracic vertebra (HCC)  Hyperkalemia  Moderate protein-calorie malnutrition (HCC)  Bilateral leg edema  -     XR CHEST (2 VW); Future  -     iron sucrose (VENOFER) 20 MG/ML injection; Infuse 10 mLs intravenously every 3 days For a total of 4 days, Disp-10 mL, R-0Normal  Shortness of breath   -     Brain Natriuretic Peptide; Future  -     XR CHEST (2 VW); Future    Due to the hyperkalemia- Stop the OTC hydration supplements-- boost, ensure or similar or plain water is fine. Labs already ordered-- repeat Basic metabolic panel on Firday (48 hours) to ensure improving. For the confusion-  Stop the zanaflex AND THE [address]- may not sleep as well but less likely to fall and also less likely to be so sleepy. Put the tylenol back in the time spots for pain and the lidocaine patch as needed. Hopefully with improved hydration and the sedating medications out of her system her cognition will improve.     CXR today as well-- with the recent new pneumonia and the hospital noted pleural effusion. Blood work to check the ammonia, BNP. Check the urine also to make sure we are on the right track without persistent infection. Will monitor the edema-- suspect high out put CHF due to the anemia-- will arrange the outpatient venofer to improve the anemia more quickly but with poor po intake hesitate to add additional diuretics. Discussed options with the family for Elzbieta's care- if the confusion is not clearing in the next 24 hours may need further evaluation if above is not revealing. Consideration for ECF respite care in the interim if family not available to provide 24 hour care. Family will let us know their decision. Medical Decision Making: high complexity  Return in 2 weeks (on 12/7/2022). On this date 11/23/2022 I have spent 70 minutes reviewing previous notes, test results and face to face with the patient discussing the diagnosis and importance of compliance with the treatment plan as well as documenting on the day of the visit. Subjective:   DENYS Darnell was admitted with nausea vomiting and diarrhea to [facility] which began about 11/5. She was admitted 11/8. She had bloody stools. EGD performed while in the hospital showed gastric erosions. She did receive several units of packed RBCs as well as an iron infusion while she was in the hospital.  She was started on a PPI twice daily on her hospital admission and advised not to use any NSAIDs. It was also noted while she was in the hospital that she had progression of her previously seen compression fracture at T12. Inpatient course: Discharge summary reviewed- see chart. Interval history/Current status: received call this am about hyperkalemia-- Potassium up to 5.7 from oncology. Family has been encouraging \"liquid IV\" to keep her hydrated since she has been home. Has had minimal water in over the last few days.     Is taking the norco in the morning and at dinnertime also-- pain has been significantly better - actually not having any pain, but has been less alert and seems more confused the last few days also. Trying to put the pill cup back into her pill box. Difficulty following directions-- can't  things. Repeating questions/ not comprehending things. Not remembering if she eaten or drank anything. Labs from yesterday also showed a stable hemoglobin at 8.8. Finished the antibiotics for the UTI and aspiration pneumonia on Friday-- still had some loose stools with the antibiotics but this has resolved. Denies current UTI sx- no incontinence, dysuria/urgency/ frequency or chills. Has not had cough or fevers or chills. Feet and ankles have been swollen -- has been keeping her feet up since she has been home-- better when she gets up in the morning. Has been wheezing also since Late on Sunday evening. Not always. Comes and goes. When she was walking and up and down the pulse Ox did drop to 88% also when PT was with her. Dropped and has not been above. Denies CP/ palpitations or PND/ orthopnea. PT has been there and has been coming in regularly. Also complains of some pain in her right hip-- complains that it is really sore to touch but not interfering with ambulation or pain when reclined. Usually only when she pushes on it on the right. Did have an xr of the hip this week which was negative for fracture. Family is very concerned that she is not able to stay at home alone at all or take her medications independently. Primary caregivers will be out of town on vacation the next 10 days and will have no one available to stay with her 24 hours.      Patient Active Problem List   Diagnosis    Leukoplakia, gingiva    Osteopenia    Simple chronic bronchitis (HCC)    Lung cancer, upper lobe (HCC)    Squamous cell carcinoma of lung, stage III, right (HCC)    Acquired hypothyroidism    Anxiety    Chronic allergic rhinitis    Essential hypertension Bilateral carotid artery stenosis    Chronic renal disease, stage III ([facility]) [638366]    Abdominal aortic aneurysm (AAA) 3.0 cm to 5.0 cm in diameter in female    Acute right-sided low back pain without sciatica    DDD (degenerative disc disease), lumbar    Protein calorie malnutrition       Medication list at time of discharge reviewed: Yes    Medications marked \"taking\" at this time  Outpatient Medications Marked as Taking for the 11/23/22 encounter (Office Visit) with Shaun Tadeo MD   Medication Sig Dispense Refill    HYDROcodone-acetaminophen (NORCO) 5-325 MG per tablet take 1 tablet by mouth every 12 hours AS NEEDED FOR PAIN (TO LAST 14 DAYS)      pantoprazole (PROTONIX) 40 MG tablet take 1 tablet by mouth twice a day before meals AT 6:30 AM AND 3 PM      tiZANidine (ZANAFLEX) 2 MG tablet take 1 tablet by mouth every 8 hours if needed for muscle spasm      iron sucrose (VENOFER) 20 MG/ML injection Infuse 10 mLs intravenously every 3 days For a total of 4 days 10 mL 0    sucralfate (CARAFATE) 1 GM/10ML suspension Take 10 mLs by mouth 4 times daily 1200 mL 3    gabapentin (NEURONTIN) 300 MG capsule Take 1 capsule by mouth 3 times daily for 30 days.  Intended supply: 30 days 90 capsule 1    escitalopram (LEXAPRO) 10 MG tablet take 1 tablet by mouth once daily 90 tablet 3    levothyroxine (SYNTHROID) 25 MCG tablet take 1 tablet by mouth once daily 90 tablet 3    losartan-hydroCHLOROthiazide (HYZAAR) 50-12.5 MG per tablet Take 1 tablet by mouth daily 30 tablet 5    LIDOCAINE-MENTHOL, SPRAY, EX Apply topically      atorvastatin (LIPITOR) 40 MG tablet Take 1 tablet by mouth daily 90 tablet 3    fluticasone (FLONASE) 50 MCG/ACT nasal spray instill 2 spray into each nostril once daily 48 g 3    Vit C-Cholecalciferol-Arlette Hip 500-1000-20 MG-UNIT-MG CAPS Take by mouth      aspirin 325 MG tablet Take 325 mg by mouth daily      lidocaine-prilocaine (EMLA) 2.5-2.5 % cream apply topically TO PORT SITE 60 TO 90  MINS PRIOR TO TREATMENT ADN COVER WITH PLASTIC WRAP  0    Probiotic Product (PROBIOTIC DAILY PO) Take by mouth      acetaminophen (TYLENOL) 500 MG tablet Take 500 mg by mouth every 6 hours as needed for Pain      ibuprofen (ADVIL;MOTRIN) 200 MG tablet Take 200 mg by mouth every 6 hours as needed for Pain       Ascorbic Acid (VITAMIN C) 500 MG CAPS Vitamin C TABS  Refills: 0  Active      Calcium Carbonate-Vit D-Min (CALCIUM 1200 PO) Take by mouth          Medications patient taking as of now reconciled against medications ordered at time of hospital discharge: Yes    Review of Systems    Objective:    /80 (Site: Right Upper Arm, Position: Sitting, Cuff Size: Small Adult)   Pulse 98   Ht 5' 3\" (1.6 m)   Wt 118 lb (53.5 kg)   SpO2 92%   BMI 20.90 kg/m²   Physical Exam  Vitals and nursing note reviewed. Constitutional:       Appearance: Normal appearance. Cardiovascular:      Rate and Rhythm: Normal rate and regular rhythm. Pulses: Normal pulses. Heart sounds: Normal heart sounds. Pulmonary:      Effort: Pulmonary effort is normal.      Breath sounds: Normal breath sounds. Comments: Minimally diminished in the bases but clear  Musculoskeletal:         General: No tenderness or deformity. Cervical back: Normal range of motion and neck supple. No tenderness. Back:       Right lower leg: Edema present. Left lower leg: Edema present. Legs:       Comments: Significant severe kyphoscoliosis  2+ soft pitting edema to the mid calf bilaterally   Lymphadenopathy:      Cervical: No cervical adenopathy. Neurological:      General: No focal deficit present. Mental Status: She is alert and oriented to person, place, and time. Psychiatric:         Mood and Affect: Mood normal.         Behavior: Behavior normal.         Thought Content: Thought content normal.         Judgment: Judgment normal.         An electronic signature was used to authenticate this note.   --Eugene Hall, MD

## 2022-11-24 LAB
BACTERIA, URINE: ABNORMAL
BILIRUBIN URINE: NEGATIVE
BLOOD, URINE: ABNORMAL
CASTS UA: ABNORMAL
CLARITY: ABNORMAL
COLOR, URINE: YELLOW
CRYSTALS, UA: ABNORMAL
GLUCOSE URINE: NEGATIVE MG/DL
KETONES, URINE: NEGATIVE MG/DL
LEUKOCYTE ESTERASE, URINE: ABNORMAL
MUCUS, URINE: ABNORMAL
NITRITE, URINE: NEGATIVE
PH UA: 6 (ref 5–8.5)
PROTEIN UA: NEGATIVE MG/DL
RBC URINE: ABNORMAL (ref 0–2)
SPECIFIC GRAVITY, URINE: 1 MG/DL (ref 1–1.03)
SQUAMOUS EPITHELIAL: ABNORMAL
TRICHOMONAS, URINE: ABNORMAL
UROBILINOGEN, URINE: 0.2 MG/DL (ref 0.2–1)
WBC URINE: ABNORMAL (ref 0–4)
YEAST, URINE: ABNORMAL

## 2022-11-28 DIAGNOSIS — R41.0 ACUTE CONFUSION: ICD-10-CM

## 2022-11-28 NOTE — RESULT ENCOUNTER NOTE
Please notify patient the labs/results are normal-- improving-- was admitted to [facility] this weekend I believe.

## 2022-12-01 ENCOUNTER — PATIENT MESSAGE (OUTPATIENT)
Dept: FAMILY MEDICINE CLINIC | Age: 77
End: 2022-12-01

## 2022-12-01 DIAGNOSIS — D50.0 BLOOD LOSS ANEMIA: Primary | ICD-10-CM

## 2022-12-01 NOTE — TELEPHONE ENCOUNTER
From: Harjit Garcia  To: Dr. Myrtle Dandy: 12/1/2022 9:52 AM EST  Subject: Follow up test/infusion     Hi -    This is Carolina sending a note on moms behalf. We know she needed a lab follow up as well as an iron infusion. As you know, she has been at [facility] in respite care this whole week. Roxana Angeles wondering if she can get those things done while shes there or if Dr. Frankie Savage wants her to wait until she is discharged this weekend.      Thank you,    Carolina

## 2022-12-03 NOTE — TELEPHONE ENCOUNTER
I wanted the labs as a follow up TO her iron infusions. Please see where she is at getting the infusions and then the labs can be done after these.

## 2022-12-07 ENCOUNTER — OFFICE VISIT (OUTPATIENT)
Dept: OTOLARYNGOLOGY | Age: 77
End: 2022-12-07
Payer: MEDICARE

## 2022-12-07 VITALS
WEIGHT: 118 LBS | SYSTOLIC BLOOD PRESSURE: 118 MMHG | OXYGEN SATURATION: 98 % | BODY MASS INDEX: 20.91 KG/M2 | RESPIRATION RATE: 16 BRPM | HEART RATE: 90 BPM | HEIGHT: 63 IN | DIASTOLIC BLOOD PRESSURE: 68 MMHG

## 2022-12-07 DIAGNOSIS — H90.6 MIXED CONDUCTIVE AND SENSORINEURAL HEARING LOSS OF BOTH EARS: Primary | ICD-10-CM

## 2022-12-07 DIAGNOSIS — H72.91 TYMPANIC MEMBRANE PERFORATION, RIGHT: ICD-10-CM

## 2022-12-07 PROCEDURE — 3078F DIAST BP <80 MM HG: CPT | Performed by: OTOLARYNGOLOGY

## 2022-12-07 PROCEDURE — 99213 OFFICE O/P EST LOW 20 MIN: CPT | Performed by: OTOLARYNGOLOGY

## 2022-12-07 PROCEDURE — 3074F SYST BP LT 130 MM HG: CPT | Performed by: OTOLARYNGOLOGY

## 2022-12-07 PROCEDURE — 1123F ACP DISCUSS/DSCN MKR DOCD: CPT | Performed by: OTOLARYNGOLOGY

## 2022-12-07 NOTE — TELEPHONE ENCOUNTER
Can you check into this? I was certain we did the orders for the iron infusion. If not can you get this set up ASAP. The urine showed no significant growth-- was contamination only. If she is still quite confused would re check urine. May need repeat CBC for the iron as has been over a week. ORder for this is in the chart. Rest of the labs were either normal or stable from the previous. May want to push the visit back a few days until this is settled out since we have nothing really to review unless they want to keep.

## 2022-12-07 NOTE — TELEPHONE ENCOUNTER
Spoke with Sofiya Monk from oncology -  she has the order and is going to call today to schedule.  Message sent back to mariano

## 2022-12-07 NOTE — PROGRESS NOTES
2022 2:53 PM EDT  Justo Birmingham (:  1945) is a 69 y.o. female,New patient, here for evaluation of the following chief complaint(s):  Ear Problem (3 wks ck left ear after ear gtts)      ASSESSMENT/PLAN:  1. Mixed conductive and sensorineural hearing loss of both ears    2. Tympanic membrane perforation, right      1. Mixed conductive and sensorineural hearing loss of both ears  2. Tympanic membrane perforation, right    Fu in 1 weeks to recheck left ear   Consider Ciprodex AS   Powder AU   Consider repeat audio   Wearing HA s     Repeat audio after treatment   Discussed etd and possible otosclerosis/tympanosclerosis     No follow-ups on file. SUBJECTIVE/OBJECTIVE:  Ear Problem  76yo woman with a plugged feeling in her left ear. Ears were recently flushed. Is wondering if the left ear has cerumen. Audio 21: Moderate to severe mixed HL AU   Type As tymp AU   % AU     Was noted to have plaque of cerumen deep in the left ear canal that did not respond to hydrogen peroxide. She has been doing hydrogen peroxide soaks for the last few days. Returned for repeat cleaning that was successful. Noted to be fungal in nature. Treated with boric acid x2. States the hearing is much better in her left ear. Less itching. Does not think she needs to pursue hearing aids at this time. Here for ear check. Decreased hearing and drainage AD. AS ear better with ciprodex otic gtts. Worsening hearing on the right.      Past Medical History:   Diagnosis Date    Leukoplakia, gingiva     Lung cancer, upper lobe ([address] 2019    Osteopenia      Past Surgical History:   Procedure Laterality Date    BRONCHOSCOPY  2019    CATARACT REMOVAL Bilateral     COLONOSCOPY  2009    tubular adenoma with low grade dysplasia     TONSILLECTOMY AND ADENOIDECTOMY      TUBAL LIGATION      UPPER GASTROINTESTINAL ENDOSCOPY  2022    [facility] [address] Dr. Cabrera Olivas History       Tobacco History       Smoking Status  Former Smoker Quit date  4/1/2019 Smoking Frequency  0.5 packs/day for 53 years (26.5 pk yrs) Smoking Tobacco Type  Cigarettes      Smokeless Tobacco Use  Never Used      Tobacco Comment  up to 1 ppd              Alcohol History       Alcohol Use Status  Not Asked              Drug Use       Drug Use Status  Not Asked              Sexual Activity       Sexually Active  Not Asked                  Family History   Problem Relation Age of Onset    Cancer Mother         multiple myeloma    Cancer Father         throat cancer    Thyroid Disease Sister     Thyroid Disease Sister     Thyroid Disease Daughter      Current Outpatient Medications   Medication Instructions    acetaminophen (TYLENOL) 500 mg, Oral, EVERY 6 HOURS PRN    Ascorbic Acid (VITAMIN C) 500 MG CAPS Vitamin C TABS  Refills: 0  Active    aspirin 325 mg, Oral, DAILY    atorvastatin (LIPITOR) 40 mg, Oral, DAILY    Calcium Carbonate-Vit D-Min (CALCIUM 1200 PO) Oral    escitalopram (LEXAPRO) 10 MG tablet take 1 tablet by mouth once daily    fluticasone (FLONASE) 50 MCG/ACT nasal spray instill 2 spray into each nostril once daily    gabapentin (NEURONTIN) 300 mg, Oral, 3 TIMES DAILY, Intended supply: 30 days    HYDROcodone-acetaminophen (NORCO) 5-325 MG per tablet take 1 tablet by mouth every 12 hours AS NEEDED FOR PAIN (TO LAST 14 DAYS)    ibuprofen (ADVIL;MOTRIN) 200 mg, Oral, EVERY 6 HOURS PRN    iron sucrose (VENOFER) 200 mg, IntraVENous, EVERY 3 DAYS, For a total of 4 days    levothyroxine (SYNTHROID) 25 MCG tablet take 1 tablet by mouth once daily    LIDOCAINE-MENTHOL, SPRAY, EX Apply externally    lidocaine-prilocaine (EMLA) 2.5-2.5 % cream apply topically TO PORT SITE 60 TO 90  MINS PRIOR TO TREATMENT ADN COVER WITH PLASTIC WRAP    losartan-hydroCHLOROthiazide (HYZAAR) 50-12.5 MG per tablet 1 tablet, Oral, DAILY    magnesium (MAGNESIUM-OXIDE) 250 mg, DAILY    Multiple Vitamin (MULTI-VITAMIN DAILY PO) Take by mouth    pantoprazole (PROTONIX) 40 MG tablet take 1 tablet by mouth twice a day before meals AT 6:30 AM AND 3 PM    Probiotic Product (PROBIOTIC DAILY PO) Oral    sucralfate (CARAFATE) 1 g, Oral, 4 TIMES DAILY    tiZANidine (ZANAFLEX) 2 MG tablet take 1 tablet by mouth every 8 hours if needed for muscle spasm    Vit C-Cholecalciferol-Arlette Hip 500-1000-20 MG-UNIT-MG CAPS Oral     No Known Allergies    ENT ROS: positive for - hearing changes     General: The patient is found to be alert and normally responsive female with grossly normal hearing, clear voice and normal articulation. Communication is without difficulty. Voice: Clear   Skin: The skin has normal colour and turgor. Face: The facial contour is symmetric at rest and with movement. Ears: The pinnae have normal contours. Otomicroscope   AD: EAC with wet debris, suctioned #3 and #5, TM with perforation anterior/inferior   powder  AS thickened TM, shallow retraction pocket in pars flaccida, tm thickened  Powder   AS: EAC with wet cloudy otorrhea, suctioned #5/3, tm thickened  Eye: The ocular movements are full and symmetric, the conjunctiva is unremarkable; sclera are anicteric, pupillary response is symmetric. No nystagmus is found. Nose:   The external nasal contour is normal  The nasal mucosa has a normal appearance. The nasal septum is straight. The turbinates have a normal appearance  The nares are patent without evidence of polyposis   Oral cavity:   The dentition is healthy. The oral mucosa is without lesions;  the tongue is symmetric with full mobility and is without fasciculation. The soft palate is symmetric. The oropharynx is unremarkable. Neck: The neck has a normal contour; no masses are found on palpation    An electronic signature was used to authenticate this note.     --Ana Martin MD     12/7/2022 2:53 PM EDT

## 2022-12-13 NOTE — TELEPHONE ENCOUNTER
Joao Armond calling states they had consult with dr Talib Sykes for kyphoplasty-possibly will be done in January, they should be faxing preop form/med consult note to sign so she can have this done.  She is also in process for getting iron infusion next couple days

## 2022-12-15 ENCOUNTER — OFFICE VISIT (OUTPATIENT)
Dept: OTOLARYNGOLOGY | Age: 77
End: 2022-12-15
Payer: MEDICARE

## 2022-12-15 VITALS
HEIGHT: 63 IN | WEIGHT: 118 LBS | OXYGEN SATURATION: 95 % | DIASTOLIC BLOOD PRESSURE: 64 MMHG | HEART RATE: 101 BPM | SYSTOLIC BLOOD PRESSURE: 134 MMHG | RESPIRATION RATE: 17 BRPM | BODY MASS INDEX: 20.91 KG/M2

## 2022-12-15 DIAGNOSIS — H90.6 MIXED CONDUCTIVE AND SENSORINEURAL HEARING LOSS OF BOTH EARS: Primary | ICD-10-CM

## 2022-12-15 DIAGNOSIS — H72.91 TYMPANIC MEMBRANE PERFORATION, RIGHT: ICD-10-CM

## 2022-12-15 PROCEDURE — 99213 OFFICE O/P EST LOW 20 MIN: CPT | Performed by: OTOLARYNGOLOGY

## 2022-12-15 PROCEDURE — 1123F ACP DISCUSS/DSCN MKR DOCD: CPT | Performed by: OTOLARYNGOLOGY

## 2022-12-15 PROCEDURE — 3078F DIAST BP <80 MM HG: CPT | Performed by: OTOLARYNGOLOGY

## 2022-12-15 PROCEDURE — 3074F SYST BP LT 130 MM HG: CPT | Performed by: OTOLARYNGOLOGY

## 2022-12-15 NOTE — PROGRESS NOTES
12/15/2022 2:53 PM EDT  Paolo Emerson (:  1945) is a 69 y.o. female,New patient, here for evaluation of the following chief complaint(s):  Ear Problem (1 wk ck ears after powder)      ASSESSMENT/PLAN:  1. Mixed conductive and sensorineural hearing loss of both ears    2. Tympanic membrane perforation, right      1. Mixed conductive and sensorineural hearing loss of both ears  2. Tympanic membrane perforation, right    Doing well   Continue ARAMBULA    Discussed etd and possible otosclerosis/tympanosclerosis     No follow-ups on file. SUBJECTIVE/OBJECTIVE:  Ear Problem  74yo woman with a plugged feeling in her left ear. Ears were recently flushed. Is wondering if the left ear has cerumen. Has known right TM perforation for years. Audio 21: Moderate to severe mixed HL AU   Type As tymp AU   % AU     Was noted to have plaque of cerumen deep in the left ear canal that did not respond to hydrogen peroxide. She has been doing hydrogen peroxide soaks for the last few days. Returned for repeat cleaning that was successful. Noted to be fungal in nature. Treated with boric acid x2. States the hearing is much better in her left ear. Less itching. Does not think she needs to pursue hearing aids at this time. At follow up, decreased hearing and drainage AD. AS ear better with ciprodex otic gtts. Worsening hearing on the right. Today, follows up 1 week later. Powder AU. Feeling good. No pain or drainage. Stable hearing.      Past Medical History:   Diagnosis Date    Leukoplakia, gingiva     Lung cancer, upper lobe ([address] 2019    Osteopenia      Past Surgical History:   Procedure Laterality Date    BRONCHOSCOPY  2019    CATARACT REMOVAL Bilateral     COLONOSCOPY  2009    tubular adenoma with low grade dysplasia     TONSILLECTOMY AND ADENOIDECTOMY      TUBAL LIGATION      UPPER GASTROINTESTINAL ENDOSCOPY  2022    Dr. Kady Reyes History       Tobacco History       Smoking Status  Former Smoker Quit date  4/1/2019 Smoking Frequency  0.5 packs/day for 53 years (26.5 pk yrs) Smoking Tobacco Type  Cigarettes      Smokeless Tobacco Use  Never Used      Tobacco Comment  up to 1 ppd              Alcohol History       Alcohol Use Status  Not Asked              Drug Use       Drug Use Status  Not Asked              Sexual Activity       Sexually Active  Not Asked                  Family History   Problem Relation Age of Onset    Cancer Mother         multiple myeloma    Cancer Father         throat cancer    Thyroid Disease Sister     Thyroid Disease Sister     Thyroid Disease Daughter      Current Outpatient Medications   Medication Instructions    acetaminophen (TYLENOL) 500 mg, Oral, EVERY 6 HOURS PRN    Ascorbic Acid (VITAMIN C) 500 MG CAPS Vitamin C TABS  Refills: 0  Active    aspirin 325 mg, Oral, DAILY    atorvastatin (LIPITOR) 40 mg, Oral, DAILY    Calcium Carbonate-Vit D-Min (CALCIUM 1200 PO) Oral    escitalopram (LEXAPRO) 10 MG tablet take 1 tablet by mouth once daily    fluticasone (FLONASE) 50 MCG/ACT nasal spray instill 2 spray into each nostril once daily    gabapentin (NEURONTIN) 300 mg, Oral, 3 TIMES DAILY, Intended supply: 30 days    HYDROcodone-acetaminophen (NORCO) 5-325 MG per tablet take 1 tablet by mouth every 12 hours AS NEEDED FOR PAIN (TO LAST 14 DAYS)    ibuprofen (ADVIL;MOTRIN) 200 mg, Oral, EVERY 6 HOURS PRN    iron sucrose (VENOFER) 200 mg, IntraVENous, EVERY 3 DAYS, For a total of 4 days    levothyroxine (SYNTHROID) 25 MCG tablet take 1 tablet by mouth once daily    LIDOCAINE-MENTHOL, SPRAY, EX Apply externally    lidocaine-prilocaine (EMLA) 2.5-2.5 % cream apply topically TO PORT SITE 60 TO 90  MINS PRIOR TO TREATMENT ADN COVER WITH PLASTIC WRAP    losartan-hydroCHLOROthiazide (HYZAAR) 50-12.5 MG per tablet 1 tablet, Oral, DAILY    magnesium (MAGNESIUM-OXIDE) 250 mg, DAILY    Multiple Vitamin (MULTI-VITAMIN DAILY PO) Take by mouth    pantoprazole (PROTONIX) 40 MG tablet take 1 tablet by mouth twice a day before meals AT 6:30 AM AND 3 PM    Probiotic Product (PROBIOTIC DAILY PO) Oral    sucralfate (CARAFATE) 1 g, Oral, 4 TIMES DAILY    tiZANidine (ZANAFLEX) 2 MG tablet take 1 tablet by mouth every 8 hours if needed for muscle spasm    Vit C-Cholecalciferol-Arlette Hip 500-1000-20 MG-UNIT-MG CAPS Oral     No Known Allergies    ENT ROS: positive for - hearing changes     General: The patient is found to be alert and normally responsive female with grossly normal hearing, clear voice and normal articulation. Communication is without difficulty. Voice: Clear   Skin: The skin has normal colour and turgor. Face: The facial contour is symmetric at rest and with movement. Ears: The pinnae have normal contours. Otomicroscope   AD: EAC clear and dry, perforation anterior/inferior   AS thickened TM, shallow retraction pocket in pars flaccida, tm thickened  AS: EAC with wet cloudy otorrhea, suctioned #5/3, tm thickened  Eye: The ocular movements are full and symmetric, the conjunctiva is unremarkable; sclera are anicteric, pupillary response is symmetric. No nystagmus is found. Nose:   The external nasal contour is normal  The nasal mucosa has a normal appearance. The nasal septum is straight. The turbinates have a normal appearance  The nares are patent without evidence of polyposis   Oral cavity:   The dentition is healthy. The oral mucosa is without lesions;  the tongue is symmetric with full mobility and is without fasciculation. The soft palate is symmetric. The oropharynx is unremarkable. Neck: The neck has a normal contour; no masses are found on palpation    An electronic signature was used to authenticate this note.     --Ashley Myles MD     12/15/2022 2:53 PM EDT

## 2022-12-19 ENCOUNTER — TELEPHONE (OUTPATIENT)
Dept: FAMILY MEDICINE CLINIC | Age: 77
End: 2022-12-19

## 2022-12-19 NOTE — TELEPHONE ENCOUNTER
Getting last infusion this Wednesday, Dec. 21.  Has appt with you this Friday, Dec. 23.  Does she need the CBC that you ordered done before she sees you?

## 2022-12-23 ENCOUNTER — TELEMEDICINE (OUTPATIENT)
Dept: FAMILY MEDICINE CLINIC | Age: 77
End: 2022-12-23
Payer: MEDICARE

## 2022-12-23 DIAGNOSIS — R60.0 BILATERAL LEG EDEMA: ICD-10-CM

## 2022-12-23 DIAGNOSIS — E44.0 MODERATE PROTEIN-CALORIE MALNUTRITION (HCC): ICD-10-CM

## 2022-12-23 DIAGNOSIS — S22.000A COMPRESSION FRACTURE OF BODY OF THORACIC VERTEBRA (HCC): ICD-10-CM

## 2022-12-23 DIAGNOSIS — R41.0 ACUTE CONFUSION: ICD-10-CM

## 2022-12-23 DIAGNOSIS — D50.0 BLOOD LOSS ANEMIA: Primary | ICD-10-CM

## 2022-12-23 DIAGNOSIS — R06.02 SOB (SHORTNESS OF BREATH) ON EXERTION: ICD-10-CM

## 2022-12-23 PROCEDURE — 99212 OFFICE O/P EST SF 10 MIN: CPT | Performed by: FAMILY MEDICINE

## 2022-12-23 PROCEDURE — 99443 PR PHYS/QHP TELEPHONE EVALUATION 21-30 MIN: CPT | Performed by: FAMILY MEDICINE

## 2022-12-23 RX ORDER — LANOLIN ALCOHOL/MO/W.PET/CERES
1000 CREAM (GRAM) TOPICAL DAILY
COMMUNITY

## 2022-12-23 RX ORDER — FERROUS SULFATE 137(45) MG
142 TABLET, EXTENDED RELEASE ORAL DAILY
COMMUNITY

## 2022-12-23 RX ORDER — PEMBROLIZUMAB 25 MG/ML
INJECTION, SOLUTION INTRAVENOUS
COMMUNITY

## 2022-12-23 NOTE — PROGRESS NOTES
2022    TELEHEALTH EVALUATION -- Audio/Visual (During [id number] public health emergency)    Chief Complaint   Patient presents with    Altered Mental Status     Drug over dosing norco/Zanaflex    2 Week Follow-Up     After doing iron infusion completed yesterday, had repeat cbc Wednesday. T12-L1 kyphoplasty with Dr Teodoro Tatum consult-cleared and scheduled 22          Felisa Shelton (:  1945) has requested an audio/video evaluation for the following concern(s):    HPI  Has stopped both the norco and the tizanidine and since that time has been Less confused than she was. Completed the iron transfusion and had a repeat CBC on Wednesday. Hemoglobin is up slightly to 9.0. But also up slightly. The leg edema has still been there and the shortness of breath has been still there when she is walking or trying to do things. Has been elevating the ankles and this helps alot. Is scheduled for kyphoplasty T12-L1 with Dr. Claudette Fair on . Continuing to use the Tylenol as needed for pain. Is eating better than she was. Appetite is still not quite back to where it was. Has been trying to eat a gluten-free diet. Family has been helping bring him meals. Has continued nasal congestion -- flonase has not really helped-- will try the asteopro    Review of Systems    Prior to Visit Medications    Medication Sig Taking?  Authorizing Provider   vitamin B-12 (CYANOCOBALAMIN) 1000 MCG tablet Take 1,000 mcg by mouth daily Yes Historical Provider, MD   ferrous sulfate (SLOW FE) 142 (45 Fe) MG extended release tablet Take 142 mg by mouth daily Yes Historical Provider, MD   pembrolizumab (KEYTRUDA) 100 MG/4ML SOLN Infuse intravenously Yes Historical Provider, MD   pantoprazole (PROTONIX) 40 MG tablet take 1 tablet by mouth twice a day before meals AT 6:30 AM AND 3 PM Yes Historical Provider, MD   sucralfate (CARAFATE) 1 GM/10ML suspension Take 10 mLs by mouth 4 times daily Yes Drake Kurtz MD   gabapentin (NEURONTIN) 300 MG capsule Take 1 capsule by mouth 3 times daily for 30 days.  Intended supply: 30 days Yes Darius Renteria MD   escitalopram (LEXAPRO) 10 MG tablet take 1 tablet by mouth once daily Yes Darius Renteria MD   levothyroxine (SYNTHROID) 25 MCG tablet take 1 tablet by mouth once daily Yes Darius Renteria MD   losartan[facility]) 50-12.5 MG per tablet Take 1 tablet by mouth daily Yes Darius Renteria MD   LIDOCAINE-MENTHOL, SPRAY, EX Apply topically Yes Historical Provider, MD   atorvastatin (LIPITOR) 40 MG tablet Take 1 tablet by mouth daily Yes Darius Renteria MD   fluticasone (FLONASE) 50 MCG/ACT nasal spray instill 2 spray into each nostril once daily Yes Darius Renteria MD   lidocaine-prilocaine (EMLA) 2.5-2.5 % cream apply topically TO PORT SITE 60 TO 80  MINS PRIOR TO TREATMENT ADN COVER WITH PLASTIC WRAP Yes Historical Provider, MD   Probiotic Product (PROBIOTIC DAILY PO) Take by mouth Yes Historical Provider, MD   acetaminophen (TYLENOL) 500 MG tablet Take 500 mg by mouth every 6 hours as needed for Pain Yes Historical Provider, MD   Ascorbic Acid (VITAMIN C) 500 MG CAPS Vitamin C TABS  Refills: 0  Active Yes Historical Provider, MD   Calcium Carbonate-Vit D-Min (CALCIUM 1200 PO) Take by mouth Yes Historical Provider, MD   magnesium (MAGNESIUM-OXIDE) 250 MG TABS tablet Take 250 mg by mouth daily  Patient not taking: No sig reported  Historical Provider, MD   Multiple Vitamin (MULTI-VITAMIN DAILY PO) Take by mouth  Patient not taking: No sig reported  Historical Provider, MD       Social History     Tobacco Use    Smoking status: Former     Packs/day: 0.50     Years: 53.00     Pack years: 26.50     Types: Cigarettes     Quit date: 4/1/2019     Years since quitting: 3.7    Smokeless tobacco: Never    Tobacco comments:     up to 1 ppd        No Known Allergies,   Past Medical History:   Diagnosis Date    Leukoplakia, gingiva     Lung cancer, upper lobe ([address] 2019    Osteopenia ,   Past Surgical History:   Procedure Laterality Date    BRONCHOSCOPY  05/02/2019    CATARACT REMOVAL Bilateral     COLONOSCOPY  2009    tubular adenoma with low grade dysplasia     TONSILLECTOMY AND ADENOIDECTOMY      TUBAL LIGATION      UPPER GASTROINTESTINAL ENDOSCOPY  11/09/2022    [facility] Dr. Sudhir Echevarria   ,   Social History     Tobacco Use    Smoking status: Former     Packs/day: 0.50     Years: 53.00     Pack years: 26.50     Types: Cigarettes     Quit date: 4/1/2019     Years since quitting: 3.7    Smokeless tobacco: Never    Tobacco comments:     up to 1 ppd   ,   Family History   Problem Relation Age of Onset    Cancer Mother         multiple myeloma    Cancer Father         throat cancer    Thyroid Disease Sister     Thyroid Disease Sister     Thyroid Disease Daughter    ,   Immunization History   Administered Date(s) Administered    COVID-19, MODERNA BLUE border, Primary or Immunocompromised, (age 12y+), IM, 100 mcg/0.5mL 02/11/2021, 03/11/2021, 10/28/2021    COVID-19, MODERNA Booster BLUE border, (age 18y+), IM, 50mcg/0.25mL 06/04/2022    INFLUENZA, INTRADERMAL, QUADRIVALENT, PRESERVATIVE FREE 10/04/2019    Influenza A ([id number]) Vaccine PF IM 12/14/2009, 12/14/2009    Influenza, FLUAD, (age 72 y+), Adjuvanted, 0.5mL 09/25/2020, 09/20/2021, 10/24/2022    Influenza, High Dose (Fluzone 65 yrs and older) 09/16/2014, 10/21/2015, 09/28/2016, 11/07/2017, 10/10/2018    Influenza, Triv, inactivated, subunit, adjuvanted, IM (Fluad 65 yrs and older) 11/07/2017, 10/10/2018    Pneumococcal Conjugate 13-valent ([id number]) 10/21/2015    Pneumococcal Polysaccharide ([id number]) 03/28/2012, 10/14/2021    Tdap (Boostrix, Adacel) 03/11/2011, 03/11/2021    Zoster Recombinant (Shingrix) 08/09/2012, 03/14/2022, 05/23/2022   ,   Health Maintenance   Topic Date Due    COVID-19 Vaccine (5 - Booster for Moderna series) 07/30/2022    Lipids  01/20/2023    Annual Wellness Visit (AWV)  01/21/2023    Depression Screen  09/15/2023 DTaP/Tdap/Td vaccine (3 - Td or Tdap) 03/11/2031    DEXA (modify frequency per FRAX score)  Completed    Flu vaccine  Completed    Shingles vaccine  Completed    Pneumococcal 65+ years Vaccine  Completed    Hepatitis C screen  Completed    Hepatitis A vaccine  Aged Out    Hib vaccine  Aged Out    Meningococcal (ACWY) vaccine  Aged Out    Low dose CT lung screening  Discontinued       PHYSICAL EXAMINATION:  [ INSTRUCTIONS:  \"[x]\" Indicates a positive item  \"[]\" Indicates a negative item  -- DELETE ALL ITEMS NOT EXAMINED]  Vital Signs: (As obtained by patient/caregiver or practitioner observation)  Patient-Reported Vitals 12/23/2022   Patient-Reported Weight 108lb   Patient-Reported Height 5'3   Patient-Reported Systolic 734   Patient-Reported Diastolic 719     Other pertinent observable physical exam findings-  Initiated as video but patient video failed. Due to this being a TeleHealth encounter, evaluation of the following organ systems is limited: Vitals/Constitutional/EENT/Resp/CV/GI//MS/Neuro/Skin/Heme-Lymph-Imm. ASSESSMENT/PLAN:  1. Blood loss anemia  Stable and slowly improving. We will recheck blood count in about 3 weeks with the iron studies and retake count. Hopefully with the improvement of the anemia the shortness of breath and leg edema will continue to improve. - Reticulocytes; Future  - CBC with Auto Differential; Future  - Iron and TIBC; Future    2. Acute confusion  Essentially resolved with the cessation of the mind altering medications. Continue to avoid these. 3. Compression fracture of body of thoracic vertebra (HCC)  Cleared to continue with her planned kyphoplasty. 4. Bilateral leg edema  Continue elevation  - Brain Natriuretic Peptide; Future    5. SOB (shortness of breath) on exertion    - Brain Natriuretic Peptide; Future    6. Moderate protein-calorie malnutrition ([address]  Continue to push the gluten-free foods. Continue to monitor the weight carefully.     Will recheck CBC reticulocyte count and iron studies in 2 weeks. If still iron deficient would repeat the iron infusions. Continue to hold any blood thinners. This includes aspirin and NSAIDs. With cleared mental status continue to hold sedating medications including opioids and muscle relaxers. Return in about 4 weeks (around 1/20/2023) for As scheduled. An  electronic signature was used to authenticate this note. [facility], was evaluated through a synchronous (real-time) audio-video encounter. The patient (or guardian if applicable) is aware that this is a billable service, which includes applicable co-pays. This Virtual Visit was conducted with patient's (and/or legal guardian's) consent. The visit was conducted pursuant to the emergency declaration under the [address] [place] [address] waiver authority and the [employer] General Act. Patient identification was verified, and a caregiver was present when appropriate. The patient was located at Home: [place] [id number]. Provider was located at [facility] (Appt Dept): [address]  [address] [place] [address].        --Shanae Ramsey MD on 12/23/2022 at 8:50 AM    Total time spent on this encounter:  38 minutes

## 2023-01-09 RX ORDER — GABAPENTIN 300 MG/1
CAPSULE ORAL
Qty: 90 CAPSULE | Refills: 1 | Status: SHIPPED | OUTPATIENT
Start: 2023-01-09 | End: 2023-02-08

## 2023-01-09 NOTE — TELEPHONE ENCOUNTER
Zulema Marroquin is requesting a refill on the following medication(s):  Requested Prescriptions     Pending Prescriptions Disp Refills    gabapentin (NEURONTIN) 300 MG capsule [Pharmacy Med Name: GABAPENTIN 300 MG CAPSULE] 90 capsule 1     Sig: take 1 capsule by mouth three times a day       Last Visit Date (If Applicable):  15/83/1704    Next Visit Date:    1/23/2023

## 2023-01-18 NOTE — RESULT ENCOUNTER NOTE
Notified patient by the phone.
Notify normal
2005  FT m 6#9  2009  FT m 8#0    AP course uncomplicated

## 2023-01-23 ENCOUNTER — OFFICE VISIT (OUTPATIENT)
Dept: FAMILY MEDICINE CLINIC | Age: 78
End: 2023-01-23

## 2023-01-23 ENCOUNTER — TELEPHONE (OUTPATIENT)
Dept: FAMILY MEDICINE CLINIC | Age: 78
End: 2023-01-23

## 2023-01-23 VITALS
DIASTOLIC BLOOD PRESSURE: 62 MMHG | WEIGHT: 110 LBS | BODY MASS INDEX: 19.49 KG/M2 | HEART RATE: 89 BPM | OXYGEN SATURATION: 94 % | HEIGHT: 63 IN | SYSTOLIC BLOOD PRESSURE: 128 MMHG

## 2023-01-23 DIAGNOSIS — M51.36 DDD (DEGENERATIVE DISC DISEASE), LUMBAR: Primary | ICD-10-CM

## 2023-01-23 DIAGNOSIS — E44.0 MODERATE PROTEIN-CALORIE MALNUTRITION (HCC): ICD-10-CM

## 2023-01-23 DIAGNOSIS — M25.552 LEFT HIP PAIN: ICD-10-CM

## 2023-01-23 DIAGNOSIS — S22.000A COMPRESSION FRACTURE OF BODY OF THORACIC VERTEBRA (HCC): ICD-10-CM

## 2023-01-23 DIAGNOSIS — Z00.00 MEDICARE ANNUAL WELLNESS VISIT, SUBSEQUENT: Primary | ICD-10-CM

## 2023-01-23 DIAGNOSIS — N18.31 STAGE 3A CHRONIC KIDNEY DISEASE (HCC): ICD-10-CM

## 2023-01-23 DIAGNOSIS — I71.43 INFRARENAL ABDOMINAL AORTIC ANEURYSM (AAA) WITHOUT RUPTURE: ICD-10-CM

## 2023-01-23 DIAGNOSIS — C34.91 SQUAMOUS CELL CARCINOMA OF LUNG, STAGE III, RIGHT (HCC): ICD-10-CM

## 2023-01-23 DIAGNOSIS — J41.0 SIMPLE CHRONIC BRONCHITIS (HCC): ICD-10-CM

## 2023-01-23 RX ORDER — KETOROLAC TROMETHAMINE 10 MG/1
10 TABLET, FILM COATED ORAL 3 TIMES DAILY PRN
COMMUNITY
Start: 2023-01-15 | End: 2023-01-23 | Stop reason: ALTCHOICE

## 2023-01-23 RX ORDER — ACETAMINOPHEN 500 MG
1000 TABLET ORAL EVERY 6 HOURS PRN
Qty: 120 TABLET | Refills: 5
Start: 2023-01-23

## 2023-01-23 ASSESSMENT — PATIENT HEALTH QUESTIONNAIRE - PHQ9
8. MOVING OR SPEAKING SO SLOWLY THAT OTHER PEOPLE COULD HAVE NOTICED. OR THE OPPOSITE, BEING SO FIGETY OR RESTLESS THAT YOU HAVE BEEN MOVING AROUND A LOT MORE THAN USUAL: 0
SUM OF ALL RESPONSES TO PHQ9 QUESTIONS 1 & 2: 3
1. LITTLE INTEREST OR PLEASURE IN DOING THINGS: 2
4. FEELING TIRED OR HAVING LITTLE ENERGY: 3
SUM OF ALL RESPONSES TO PHQ QUESTIONS 1-9: 11
3. TROUBLE FALLING OR STAYING ASLEEP: 1
SUM OF ALL RESPONSES TO PHQ QUESTIONS 1-9: 11
2. FEELING DOWN, DEPRESSED OR HOPELESS: 1
6. FEELING BAD ABOUT YOURSELF - OR THAT YOU ARE A FAILURE OR HAVE LET YOURSELF OR YOUR FAMILY DOWN: 1
10. IF YOU CHECKED OFF ANY PROBLEMS, HOW DIFFICULT HAVE THESE PROBLEMS MADE IT FOR YOU TO DO YOUR WORK, TAKE CARE OF THINGS AT HOME, OR GET ALONG WITH OTHER PEOPLE: 1
9. THOUGHTS THAT YOU WOULD BE BETTER OFF DEAD, OR OF HURTING YOURSELF: 0
SUM OF ALL RESPONSES TO PHQ QUESTIONS 1-9: 11
5. POOR APPETITE OR OVEREATING: 3
SUM OF ALL RESPONSES TO PHQ QUESTIONS 1-9: 11
7. TROUBLE CONCENTRATING ON THINGS, SUCH AS READING THE NEWSPAPER OR WATCHING TELEVISION: 0

## 2023-01-23 ASSESSMENT — LIFESTYLE VARIABLES
HOW MANY STANDARD DRINKS CONTAINING ALCOHOL DO YOU HAVE ON A TYPICAL DAY: PATIENT DOES NOT DRINK
HOW OFTEN DO YOU HAVE A DRINK CONTAINING ALCOHOL: NEVER

## 2023-01-23 NOTE — PATIENT INSTRUCTIONS
Personalized Preventive Plan for Emory Alanis - 1/23/2023  Medicare offers a range of preventive health benefits. Some of the tests and screenings are paid in full while other may be subject to a deductible, co-insurance, and/or copay. Some of these benefits include a comprehensive review of your medical history including lifestyle, illnesses that may run in your family, and various assessments and screenings as appropriate. After reviewing your medical record and screening and assessments performed today your provider may have ordered immunizations, labs, imaging, and/or referrals for you. A list of these orders (if applicable) as well as your Preventive Care list are included within your After Visit Summary for your review. Other Preventive Recommendations:    A preventive eye exam performed by an eye specialist is recommended every 1-2 years to screen for glaucoma; cataracts, macular degeneration, and other eye disorders. A preventive dental visit is recommended every 6 months. Try to get at least 150 minutes of exercise per week or 10,000 steps per day on a pedometer . Order or download the FREE \"Exercise & Physical Activity: Your Everyday Guide\" from [employer]. Call 7-256.381.6893 or search [employer] online. You need 1661-8502 mg of calcium and 3162-2873 IU of vitamin D per day. It is possible to meet your calcium requirement with diet alone, but a vitamin D supplement is usually necessary to meet this goal.  When exposed to the sun, use a sunscreen that protects against both UVA and UVB radiation with an SPF of 30 or greater. Reapply every 2 to 3 hours or after sweating, drying off with a towel, or swimming. Always wear a seat belt when traveling in a car. Always wear a helmet when riding a bicycle or motorcycle.        Preventing Falls: Care Instructions  Overview     Getting around your home safely can be a challenge if you have injuries or health problems that make it easy for you to fall. Loose rugs and furniture in walkways are among the dangers for many older people who have problems walking or who have poor eyesight. People who have conditions such as arthritis, osteoporosis, or dementia also have to be careful not to fall. You can make your home safer with a few simple measures. Follow-up care is a key part of your treatment and safety. Be sure to make and go to all appointments, and call your doctor if you are having problems. It's also a good idea to know your test results and keep a list of the medicines you take. How can you care for yourself at home? Taking care of yourself  Exercise regularly to improve your strength, muscle tone, and balance. Walk if you can. Swimming may be a good choice if you cannot walk easily. Have your vision and hearing checked each year or any time you notice a change. If you have trouble seeing and hearing, you might not be able to avoid objects and could lose your balance. Know the side effects of the medicines you take. Ask your doctor or pharmacist whether the medicines you take can affect your balance. Sleeping pills or sedatives can affect your balance. Limit the amount of alcohol you drink. Alcohol can impair your balance and other senses. Ask your doctor whether calluses or corns on your feet need to be removed. If you wear loose-fitting shoes because of calluses or corns, you can lose your balance and fall. Talk to your doctor if you have numbness in your feet. You may get dizzy if you do not drink enough water. To prevent dehydration, drink plenty of fluids. Choose water and other clear liquids. If you have kidney, heart, or liver disease and have to limit fluids, talk with your doctor before you increase the amount of fluids you drink. Preventing falls at home  Remove raised doorway thresholds, throw rugs, and clutter. Repair loose carpet or raised areas in the floor.   Move furniture and electrical cords to keep them out of walking paths. Use nonskid floor wax, and wipe up spills right away, especially on ceramic tile floors. If you use a walker or cane, put rubber tips on it. If you use crutches, clean the bottoms of them regularly with an abrasive pad, such as steel wool. Keep your house well lit, especially stairways, porches, and outside walkways. Use night-lights in areas such as hallways and bathrooms. Add extra light switches or use remote switches (such as switches that go on or off when you clap your hands) to make it easier to turn lights on if you have to get up during the night. Install sturdy handrails on stairways. Move items in your cabinets so that the things you use a lot are on the lower shelves (about waist level). Keep a cordless phone and a flashlight with new batteries by your bed. If possible, put a phone in each of the main rooms of your house, or carry a cell phone in case you fall and cannot reach a phone. Or, you can wear a device around your neck or wrist. You push a button that sends a signal for help. Wear low-heeled shoes that fit well and give your feet good support. Use footwear with nonskid soles. Check the heels and soles of your shoes for wear. Repair or replace worn heels or soles. Do not wear socks without shoes on smooth floors, such as wood. Walk on the grass when the sidewalks are slippery. If you live in an area that gets snow and ice in the winter, sprinkle salt on slippery steps and sidewalks. Or ask a family member or friend to do this for you. Preventing falls in the bath  Install grab bars and nonskid mats inside and outside your shower or tub and near the toilet and sinks. Use shower chairs and bath benches. Use a hand-held shower head that will allow you to sit while showering.   Get into a tub or shower by putting the weaker leg in first. Get out of a tub or shower with your strong side first.  Repair loose toilet seats and consider installing a raised toilet seat to make getting on and off the toilet easier. Keep your bathroom door unlocked while you are in the shower. Where can you learn more? Go to http://www.mast.com/ and enter G117 to learn more about \"Preventing Falls: Care Instructions. \"  Current as of: May 4, 2022               Content Version: 13.5  © 2006-2022 [employer]. Care instructions adapted under license by [facility] ([facility]). If you have questions about a medical condition or this instruction, always ask your healthcare professional. [address] any warranty or liability for your use of this information. Learning About Mindfulness for Stress  What are mindfulness and stress? Stress is what you feel when you have to handle more than you are used to. A lot of things can cause stress. You may feel stress when you go on a job interview, take a test, or run a race. This kind of short-term stress is normal and even useful. It can help you if you need to work hard or react quickly. Stress also can last a long time. Long-term stress is caused by stressful situations or events. Examples of long-term stress include long-term health problems, ongoing problems at work, and conflicts in your family. Long-term stress can harm your health. Mindfulness is a focus only on things happening in the present moment. It's a process of purposefully paying attention to and being aware of your surroundings, your emotions, your thoughts, and how your body feels. You are aware of these things, but you aren't judging these experiences as \"good\" or \"bad. \" Mindfulness can help you learn to calm your mind and body to help you cope with illness, pain, and stress. How does mindfulness help to relieve stress? Mindfulness can help quiet your mind and relax your body. Studies show that it can help some people sleep better, feel less anxious, and bring their blood pressure down.  And it's been shown to help some people live and cope better with certain health problems like heart disease, depression, chronic pain, and cancer. How do you practice mindfulness? To be mindful is to pay attention, to be present, and to be accepting. When you're mindful, you do just one thing and you pay close attention to that one thing. For example, you may sit quietly and notice your emotions or how your food tastes and smells. When you're present, you focus on the things that are happening right now. You let go of your thoughts about the past and the future. When you dwell on the past or the future, you miss moments that can heal and strengthen you. You may miss moments like hearing a child laugh or seeing a friendly face when you think you're all alone. When you're accepting, you don't  the present moment. Instead you accept your thoughts and feelings as they come. You can practice anytime, anywhere, and in any way you choose. You can practice in many ways. Here are a few ideas:  While doing your chores, like washing the dishes, let your mind focus on what's in your hand. What does the dish feel like? Is the water warm or cold? Go outside and take a few deep breaths. What is the air like? Is it warm or cold? When you can, take some time at the start of your day to sit alone and think. Take a slow walk by yourself. Count your steps while you breathe in and out. Try yoga breathing exercises, stretches, and poses to strengthen and relax your muscles. At work, if you can, try to stop for a few moments each hour. Note how your body feels. Let yourself regroup and let your mind settle before you return to what you were doing. If you struggle with anxiety or \"worry thoughts,\" imagine your mind as a blue john and your worry thoughts as clouds. Now imagine those worry thoughts floating across your mind's john. Just let them pass by as you watch. Follow-up care is a key part of your treatment and safety.  Be sure to make and go to all appointments, and call your doctor if you are having problems. It's also a good idea to know your test results and keep a list of the medicines you take. Where can you learn more? Go to http://www.mast.com/ and enter M676 to learn more about \"Learning About Mindfulness for Stress. \"  Current as of: February 9, 2022               Content Version: 13.5  © 2006-2022 [employer]. Care instructions adapted under license by [facility] ([facility]). If you have questions about a medical condition or this instruction, always ask your healthcare professional. [place] [address] any warranty or liability for your use of this information. Fatigue: Care Instructions  Your Care Instructions     Fatigue is a feeling of tiredness, exhaustion, or lack of energy. You may feel fatigue because of too much or not enough activity. It can also come from stress, lack of sleep, boredom, and poor diet. Many medical problems, such as viral infections, can cause fatigue. Emotional problems, especially depression, are often the cause of fatigue. Fatigue is most often a symptom of another problem. Treatment for fatigue depends on the cause. For example, if you have fatigue because you have a certain health problem, treating this problem also treats your fatigue. If depression or anxiety is the cause, treatment may help. Follow-up care is a key part of your treatment and safety. Be sure to make and go to all appointments, and call your doctor if you are having problems. It's also a good idea to know your test results and keep a list of the medicines you take. How can you care for yourself at home? Get regular exercise. But don't overdo it. Go back and forth between rest and exercise. Get plenty of rest.  Eat a healthy diet. Do not skip meals, especially breakfast.  Reduce your use of caffeine, tobacco, and alcohol.  Caffeine is most often found in coffee, tea, cola drinks, and chocolate. Limit medicines that can cause fatigue. This includes tranquilizers and cold and allergy medicines. When should you call for help? Watch closely for changes in your health, and be sure to contact your doctor if:    You have new symptoms such as fever or a rash.     Your fatigue gets worse.     You have been feeling down, depressed, or hopeless. Or you may have lost interest in things that you usually enjoy.     You are not getting better as expected. Where can you learn more? Go to http://www.woods.com/ and enter Y945 to learn more about \"Fatigue: Care Instructions. \"  Current as of: February 9, 2022               Content Version: 13.5  © 2006-2022 [employer]. Care instructions adapted under license by [facility] ([facility]). If you have questions about a medical condition or this instruction, always ask your healthcare professional. [place] [address] any warranty or liability for your use of this information. Learning About Stress  What is stress? Stress is what you feel when you have to handle more than you are used to. Stress is a fact of life for most people, and it affects everyone differently. What causes stress for you may not be stressful for someone else. A lot of things can cause stress. You may feel stress when you go on a job interview, take a test, or run a race. This kind of short-term stress is normal and even useful. It can help you if you need to work hard or react quickly. For example, stress can help you finish an important job on time. Stress also can last a long time. Long-term stress is caused by stressful situations or events. Examples of long-term stress include long-term health problems, ongoing problems at work, or conflicts in your family. Long-term stress can harm your health. How does stress affect your health? When you are stressed, your body responds as though you are in danger.  It makes hormones that speed up your heart, make you breathe faster, and give you a burst of energy. This is called the fight-or-flight stress response. If the stress is over quickly, your body goes back to normal and no harm is done. But if stress happens too often or lasts too long, it can have bad effects. Long-term stress can make you more likely to get sick, and it can make symptoms of some diseases worse. If you tense up when you are stressed, you may develop neck, shoulder, or low back pain. Stress is linked to high blood pressure and heart disease. Stress also harms your emotional health. It can make you montaño, tense, or depressed. Your relationships may suffer, and you may not do well at work or school. What can you do to manage stress? How to relax your mind   Write. It may help to write about things that are bothering you. This helps you find out how much stress you feel and what is causing it. When you know this, you can find better ways to cope. Let your feelings out. Talk, laugh, cry, and express anger when you need to. Talking with friends, family, a counselor, or a member of the clergy about your feelings is a healthy way to relieve stress. Do something you enjoy. For example, listen to music or go to a movie. Practice your hobby or do volunteer work. Meditate. This can help you relax, because you are not worrying about what happened before or what may happen in the future. Do guided imagery. Imagine yourself in any setting that helps you feel calm. You can use audiotapes, books, or a teacher to guide you. How to relax your body   Do something active. Exercise or activity can help reduce stress. Walking is a great way to get started. Even everyday activities such as housecleaning or yard work can help. Do breathing exercises. For example:  From a standing position, bend forward from the waist with your knees slightly bent. Let your arms dangle close to the floor.   Breathe in slowly and deeply as you return to a standing position. Roll up slowly and lift your head last.  Hold your breath for just a few seconds in the standing position.  Breathe out slowly and bend forward from the waist.  Try yoga or cecilia chi. These techniques combine exercise and meditation. You may need some training at first to learn them.  What can you do to prevent stress?  Manage your time. This helps you find time to do the things you want and need to do.  Get enough sleep. Your body recovers from the stresses of the day while you are sleeping.  Get support. Your family, friends, and community can make a difference in how you experience stress.  Where can you learn more?  Go to https://www.[facility].net/patientEd and enter N032 to learn more about \"Learning About Stress.\"  Current as of: October 6, 2021               Content Version: 13.5  © 9982-6174 [employer].   Care instructions adapted under license by [employer]. If you have questions about a medical condition or this instruction, always ask your healthcare professional. [employer] disclaims any warranty or liability for your use of this information.           Learning About Being Active as an Older Adult  Why is being active important as you get older?     Being active is one of the best things you can do for your health. And it's never too late to start. Being active--or getting active, if you aren't already--has definite benefits. It can:  Give you more energy,  Keep your mind sharp.  Improve balance to reduce your risk of falls.  Help you manage chronic illness with fewer medicines.  No matter how old you are, how fit you are, or what health problems you have, there is a form of activity that will work for you. And the more physical activity you can do, the better your overall health will be.  What kinds of activity can help you stay healthy?  Being more active will make your daily activities easier. Physical activity includes planned exercise and things you do in daily  life. There are four types of activity:  Aerobic. Doing aerobic activity makes your heart and lungs strong. Includes walking, dancing, and gardening. Aim for at least 2½ hours spread throughout the week. It improves your energy and can help you sleep better. Muscle-strengthening. This type of activity can help maintain muscle and strengthen bones. Includes climbing stairs, using resistance bands, and lifting or carrying heavy loads. Aim for at least twice a week. It can help protect the knees and other joints. Stretching. Stretching gives you better range of motion in joints and muscles. Includes upper arm stretches, calf stretches, and gentle yoga. Aim for at least twice a week, preferably after your muscles are warmed up from other activities. It can help you function better in daily life. Balancing. This helps you stay coordinated and have good posture. Includes heel-to-toe walking, cecilia chi, and certain types of yoga. Aim for at least 3 days a week. It can reduce your risk of falling. Even if you have a hard time meeting the recommendations, it's better to be more active than less active. All activity done in each category counts toward your weekly total. You'd be surprised how daily things like carrying groceries, keeping up with grandchildren, and taking the stairs can add up. What keeps you from being active? If you've had a hard time being more active, you're not alone. Maybe you remember being able to do more. Or maybe you've never thought of yourself as being active. It's frustrating when you can't do the things you want. Being more active can help. What's holding you back? Getting started. Have a goal, but break it into easy tasks. Small steps build into big accomplishments. Staying motivated. If you feel like skipping your activity, remember your goal. Maybe you want to move better and stay independent. Every activity gets you one step closer.   Not feeling your best.  Start with 5 minutes of an activity you enjoy. Prove to yourself you can do it. As you get comfortable, increase your time. You may not be where you want to be. But you're in the process of getting there. Everyone starts somewhere. How can you find safe ways to stay active? Talk with your doctor about any physical challenges you're facing. Make a plan with your doctor if you have a health problem or aren't sure how to get started with activity. If you're already active, ask your doctor if there is anything you should change to stay safe as your body and health change. If you tend to feel dizzy after you take medicine, avoid activity at that time. Try being active before you take your medicine. This will reduce your risk of falls. If you plan to be active at home, make sure to clear your space before you get started. Remove things like TV cords, coffee tables, and throw rugs. It's safest to have plenty of space to move freely. The key to getting more active is to take it slow and steady. Try to improve only a little bit at a time. Pick just one area to improve on at first. And if an activity hurts, stop and talk to your doctor. Where can you learn more? Go to http://www.mast.com/ and enter P600 to learn more about \"Learning About Being Active as an Older Adult. \"  Current as of: October 10, 2022               Content Version: 13.5  © 2570-6204 [employer]. Care instructions adapted under license by [facility] ([facility]). If you have questions about a medical condition or this instruction, always ask your healthcare professional. [address] any warranty or liability for your use of this information. Hearing Loss: Care Instructions  Overview     Hearing loss is a sudden or slow decrease in how well you hear. It can range from mild to severe. Permanent hearing loss can occur with aging. It also can happen when you are exposed long-term to loud noise.  Examples include listening to loud music, riding motorcycles, or being around other loud machines. Hearing loss can affect your work and home life. It can make you feel lonely or depressed. You may feel that you have lost your independence. But hearing aids and other devices can help you hear better and feel connected to others. Follow-up care is a key part of your treatment and safety. Be sure to make and go to all appointments, and call your doctor if you are having problems. It's also a good idea to know your test results and keep a list of the medicines you take. How can you care for yourself at home? Avoid loud noises whenever possible. This helps keep your hearing from getting worse. Always wear hearing protection around loud noises. Wear a hearing aid as directed. See a professional who can help you pick a hearing aid that fits you. Have hearing tests as your doctor suggests. They can show whether your hearing has changed. Your hearing aid may need to be adjusted. Use other devices as needed. These may include:  Telephone amplifiers and hearing aids that can connect to a television, stereo, radio, or microphone. Devices that use lights or vibrations. These alert you to the doorbell, a ringing telephone, or a baby monitor. Television closed-captioning. This shows the words at the bottom of the screen. Most new TVs can do this. TTY (text telephone). This lets you type messages back and forth on the telephone instead of talking or listening. These devices are also called TDD. When messages are typed on the keyboard, they are sent over the phone line to a receiving TTY. The message is shown on a monitor. Use text messaging, social media, and email if it is hard for you to communicate by telephone. Try to learn a listening technique called speechreading. It is not lipreading. You pay attention to people's gestures, expressions, posture, and tone of voice. These clues can help you understand what a person is saying. Face the person you are talking to, and have them face you. Make sure the lighting is good. You need to see the other person's face clearly. Think about counseling if you need help to adjust to your hearing loss. When should you call for help? Watch closely for changes in your health, and be sure to contact your doctor if:    You think your hearing is getting worse.     You have new symptoms, such as dizziness or nausea. Where can you learn more? Go to http://www.mast.com/ and enter R798 to learn more about \"Hearing Loss: Care Instructions. \"  Current as of: May 4, 2022               Content Version: 13.5  © 1302-7369 [employer]. Care instructions adapted under license by [facility] [facility] ([facility]). If you have questions about a medical condition or this instruction, always ask your healthcare professional. [address] any warranty or liability for your use of this information. Learning About Activities of Daily Living  What are activities of daily living? Activities of daily living (ADLs) are the basic self-care tasks you do every day. As you age, and if you have health problems, you may find that it's harder to do these things for yourself. That's when you may need some help. Your doctor uses ADLs to measure how much help you need. Knowing what you can and can't do for yourself is an important first step to getting help. And when you have the help you need, you can stay as independent as possible. Your doctor will want to know if you are able to do tasks such as: Take a bath or shower without help. Go to the bathroom by yourself. Dress and undress without help. Shave, comb your hair, and brush teeth on your own. Get in and out of bed or a chair without help. Feed yourself without help. If you are having trouble doing basic self-care tasks, talk with your doctor.  You may want to bring a caregiver or family member who can help the doctor understand your needs and abilities. How will a doctor assess your ADLs? Asking about ADLs is part of a routine health checkup your doctor will likely do as you age. Your health check might be done in a doctor's office, in your home, or at a hospital. The goal is to find out if you are having any problems that could make your health problems worse or that make it unsafe for you to be on your own. To measure your ADLs, your doctor will ask how hard it is for you to do routine tasks. He or she may also want to know if you have changed the way you do a task because of a health problem. He or she may watch how you:  Walk back and forth. Keep your balance while you stand or walk. Move from sitting to standing or from a bed to a chair. Button or unbutton a shirt or sweater. Remove and put on your shoes. It's normal to feel a little worried or anxious if you find you can't do all the things you used to be able to do. Talking with your doctor about ADLs isn't a test that you either pass or fail. It's just a way to get more information about your health and safety. Follow-up care is a key part of your treatment and safety. Be sure to make and go to all appointments, and call your doctor if you are having problems. It's also a good idea to know your test results and keep a list of the medicines you take. Current as of: October 6, 2021               Content Version: 13.5  © 2006-2022 Healthwise, Incorporated. Care instructions adapted under license by [facility] ([facility]). If you have questions about a medical condition or this instruction, always ask your healthcare professional. [place] [address] any warranty or liability for your use of this information. Advance Directives: Care Instructions  Overview  An advance directive is a legal way to state your wishes at the end of your life. It tells your family and your doctor what to do if you can't say what you want.   There are two main types of advance directives. You can change them any time your wishes change. Living will. This form tells your family and your doctor your wishes about life support and other treatment. The form is also called a declaration. Medical power of . This form lets you name a person to make treatment decisions for you when you can't speak for yourself. This person is called a health care agent (health care proxy, health care surrogate). The form is also called a durable power of  for health care. If you do not have an advance directive, decisions about your medical care may be made by a family member, or by a doctor or a  who doesn't know you. It may help to think of an advance directive as a gift to the people who care for you. If you have one, they won't have to make tough decisions by themselves. For more information, including forms for your state, see the H?[facility] website (www.caringinfo.org/planning/advance-directives/). Follow-up care is a key part of your treatment and safety. Be sure to make and go to all appointments, and call your doctor if you are having problems. It's also a good idea to know your test results and keep a list of the medicines you take. What should you include in an advance directive? Many states have a unique advance directive form. (It may ask you to address specific issues.) Or you might use a universal form that's approved by many states. If your form doesn't tell you what to address, it may be hard to know what to include in your advance directive. Use the questions below to help you get started. Who do you want to make decisions about your medical care if you are not able to? What life-support measures do you want if you have a serious illness that gets worse over time or can't be cured? What are you most afraid of that might happen? (Maybe you're afraid of having pain, losing your independence, or being kept alive by machines.)  Where would you prefer to die?  (Your home? A hospital? A nursing home?)  Do you want to donate your organs when you die? Do you want certain Moravian practices performed before you die? When should you call for help? Be sure to contact your doctor if you have any questions. Where can you learn more? Go to http://www.mast.com/ and enter R264 to learn more about \"Advance Directives: Care Instructions. \"  Current as of: June 16, 2022               Content Version: 13.5  © 2006-2022 Healthwise, Incorporated. Care instructions adapted under license by [facility] ([facility]). If you have questions about a medical condition or this instruction, always ask your healthcare professional. [address] any warranty or liability for your use of this information. Personalized Preventive Plan for [place] - 1/23/2023  Medicare offers a range of preventive health benefits. Some of the tests and screenings are paid in full while other may be subject to a deductible, co-insurance, and/or copay. Some of these benefits include a comprehensive review of your medical history including lifestyle, illnesses that may run in your family, and various assessments and screenings as appropriate. After reviewing your medical record and screening and assessments performed today your provider may have ordered immunizations, labs, imaging, and/or referrals for you. A list of these orders (if applicable) as well as your Preventive Care list are included within your After Visit Summary for your review. Other Preventive Recommendations:    A preventive eye exam performed by an eye specialist is recommended every 1-2 years to screen for glaucoma; cataracts, macular degeneration, and other eye disorders. A preventive dental visit is recommended every 6 months. Try to get at least 150 minutes of exercise per week or 10,000 steps per day on a pedometer .   Order or download the FREE \"Exercise & Physical Activity: Your Everyday Guide\" from [employer]. Call 7-177.805.9544 or search [employer] online. You need 2973-1492 mg of calcium and 7329-4967 IU of vitamin D per day. It is possible to meet your calcium requirement with diet alone, but a vitamin D supplement is usually necessary to meet this goal.  When exposed to the sun, use a sunscreen that protects against both UVA and UVB radiation with an SPF of 30 or greater. Reapply every 2 to 3 hours or after sweating, drying off with a towel, or swimming. Always wear a seat belt when traveling in a car. Always wear a helmet when riding a bicycle or motorcycle.

## 2023-01-23 NOTE — PROGRESS NOTES
Medicare Annual Wellness Visit    Debra Barclay is here for Medicare AWV and Back Pain (Left side low back pain - she was in the ER 1/15/23 after a fall on 1/14/23. She was given a shot of toradol and a prescription for toradol for pain. She has only been taking tylenol in the morning and at night and is still having pain. They want to know if she should try the toradol for the pain or increase tylenol. )    Assessment & Plan   Medicare annual wellness visit, subsequent  Left hip pain  -     acetaminophen (TYLENOL) 500 MG tablet; Take 2 tablets by mouth every 6 hours as needed for Pain, Disp-120 tablet, R-5Adjust Sig  Squamous cell carcinoma of lung, stage III, right (HCC)  Moderate protein-calorie malnutrition (HCC)  Simple chronic bronchitis (HCC)  Stage 3a chronic kidney disease (HCC)  Infrarenal abdominal aortic aneurysm (AAA) without rupture        Increase the tylenol to 4 times daily while the pain is present. Heat as needed. PT/ OT referral placed for assisted living and the H and P form completed. Continue with the follow up with Oncology- stable on the keytruda at this time. Tolerating well-- continue to follow the labs ordered every 3 weeks. Will observe the nail fungus-- if she has difficulty trimming nails can refer to podiatry at the assisted living     Hypothyroid is asx and controlled. No changes indicated. Recommendations for Preventive Services Due: see orders and patient instructions/AVS.  Recommended screening schedule for the next 5-10 years is provided to the patient in written form: see Patient Instructions/AVS.     Return in 3 months (on 4/23/2023) for Medicare Annual Wellness Visit in 1 year, Medication recheck. Subjective   The following acute and/or chronic problems were also addressed today:  Did have a fall about 10 days ago-- not sure what happened. Did not feel unstable or dizzy. Just stumbled and went down onto her left side. Was in the ER and the hip was OK. Has pain in the left upper outer gluteal area. Has the pain which comes and goes. Walking actually seems to help. Using the tylenol twice daily now. Will be moving in the next week to [facility]-- family is concerned with her being alone. Concerned with toenails turing color-- No pain-- ? Fungus. Patient's complete Health Risk Assessment and screening values have been reviewed and are found in Flowsheets. The following problems were reviewed today and where indicated follow up appointments were made and/or referrals ordered. Positive Risk Factor Screenings with Interventions:    Fall Risk:  Do you feel unsteady or are you worried about falling? : (!) yes  2 or more falls in past year?: (!) yes  Fall with injury in past year?: (!) yes     Interventions:    See A/P for plan and any pertinent orders    Cognitive:    Words recalled: 3 Words Recalled   Clock Drawing Test (CDT): (!) Abnormal   Total Score: 3   Total Score Interpretation: Normal Mini-Cog      Depression:  PHQ-2 Score: 3  PHQ-9 Total Score: 11    Interpretation:   1-4 = minimal  5-9 = mild  10-14 = moderate  15-19 = moderately severe  20-27 = severe  Interventions:  See progress note          General HRA Questions:  Select all that apply: (!) New or Increased Pain, New or Increased Fatigue, Stress    Pain Interventions:  See A/P for plan and any pertinent orders    Fatigue Interventions:  See A/P for plan and any pertinent orders    Stress Interventions:  See A/P for plan and any pertinent orders       Weight and Activity:  Physical Activity: Inactive    Days of Exercise per Week: 0 days    Minutes of Exercise per Session: 0 min     On average, how many days per week do you engage in moderate to strenuous exercise (like a brisk walk)?: 0 days  Have you lost any weight without trying in the past 3 months?: (!) Yes  Body mass index: 19.48    Inactivity Interventions:  Patient declined any further interventions or treatment    Unintentional Weight Loss Interventions:  See A/P for plan and any pertinent orders          ADL's:   Patient reports needing help with:  Select all that apply: (!) Transportation  Interventions:  Patient relies on family for transportation    Lung Cancer Screening:  Under treatment for the lung cancer            Objective   Vitals:    01/23/23 1047   BP: 128/62   Site: Left Upper Arm   Position: Sitting   Cuff Size: Medium Adult   Pulse: 89   SpO2: 94%   Weight: 110 lb (49.9 kg)   Height: 5' 3\" (1.6 m)      Body mass index is 19.49 kg/m². Physical Exam  Vitals and nursing note reviewed. Constitutional:       Appearance: Normal appearance. Cardiovascular:      Rate and Rhythm: Normal rate and regular rhythm. Pulses: Normal pulses. Heart sounds: Normal heart sounds. Pulmonary:      Effort: Pulmonary effort is normal.      Breath sounds: Normal breath sounds. Comments: Minimally diminished in the bases but clear  Musculoskeletal:         General: No tenderness or deformity. Cervical back: Normal range of motion and neck supple. No tenderness. Legs:       Comments: Significant severe kyphoscoliosis  No significant edema   Lymphadenopathy:      Cervical: No cervical adenopathy. Neurological:      General: No focal deficit present. Mental Status: She is alert and oriented to person, place, and time. Psychiatric:         Mood and Affect: Mood normal.         Behavior: Behavior normal.         Thought Content: Thought content normal.         Judgment: Judgment normal.          No Known Allergies  Prior to Visit Medications    Medication Sig Taking?  Authorizing Provider   Azelastine HCl (ASTEPRO NA) by Nasal route Yes Historical Provider, MD   acetaminophen (TYLENOL) 500 MG tablet Take 2 tablets by mouth every 6 hours as needed for Pain Yes Alexandre Pope MD   gabapentin (NEURONTIN) 300 MG capsule take 1 capsule by mouth three times a day Yes Alexandre Pope MD   vitamin B-12 (CYANOCOBALAMIN) 1000 MCG tablet Take 1,000 mcg by mouth daily Yes Historical Provider, MD   ferrous sulfate (SLOW FE) 142 (45 Fe) MG extended release tablet Take 142 mg by mouth daily Yes Historical Provider, MD   pembrolizumab (KEYTRUDA) 100 MG/4ML SOLN Infuse intravenously Yes Historical Provider, MD   pantoprazole (PROTONIX) 40 MG tablet take 1 tablet by mouth twice a day before meals AT 6:30 AM AND 3 PM Yes Historical Provider, MD   sucralfate (CARAFATE) 1 GM/10ML suspension Take 10 mLs by mouth 4 times daily Yes Landon Solares MD   escitalopram (LEXAPRO) 10 MG tablet take 1 tablet by mouth once daily Yes Landon Solares MD   levothyroxine (SYNTHROID) 25 MCG tablet take 1 tablet by mouth once daily Yes Landon Solares MD   losartan-hydroCHLOROthiazide [facility]) 50-12.5 MG per tablet Take 1 tablet by mouth daily Yes Landon Solares MD   atorvastatin (LIPITOR) 40 MG tablet Take 1 tablet by mouth daily Yes Landon Solares MD   Multiple Vitamin (MULTI-VITAMIN DAILY PO) Take by mouth Yes Historical Provider, MD   lidocaine-prilocaine (EMLA) 2.5-2.5 % cream apply topically TO PORT SITE 61 TO 80  MINS PRIOR TO TREATMENT ADN COVER WITH PLASTIC WRAP Yes Historical Provider, MD   Probiotic Product (PROBIOTIC DAILY PO) Take by mouth Yes Historical Provider, MD   Ascorbic Acid (VITAMIN C) 500 MG CAPS Vitamin C TABS  Refills: 0  Active Yes Historical Provider, MD   Calcium Carbonate-Vit D-Min (CALCIUM 1200 PO) Take by mouth Yes Historical Provider, MD   Handicap Placard MISC by Does not apply route EXP 1/23/2028  [facility] (Including outside providers/suppliers regularly involved in providing care):   Patient Care Team:  Landon Solares MD as PCP - General (Family Medicine)  Landon Solares MD as PCP - [facility]  Jamilah Shah MD as Consulting Physician (Hematology and Oncology)     Reviewed and updated this visit:  Tobacco  Allergies  Meds  Problems  Med Hx  Surg Hx  Soc Hx  Fam Hx

## 2023-01-27 DIAGNOSIS — M51.36 DDD (DEGENERATIVE DISC DISEASE), LUMBAR: Primary | ICD-10-CM

## 2023-01-27 DIAGNOSIS — C34.91 SQUAMOUS CELL CARCINOMA OF LUNG, STAGE III, RIGHT (HCC): ICD-10-CM

## 2023-01-27 NOTE — TELEPHONE ENCOUNTER
Attached scripts could find in system, send scripts to [facility].  Call [address] when done [phone]

## 2023-02-15 ENCOUNTER — OFFICE VISIT (OUTPATIENT)
Dept: SURGERY | Age: 78
End: 2023-02-15
Payer: MEDICARE

## 2023-02-15 VITALS
BODY MASS INDEX: 19.84 KG/M2 | DIASTOLIC BLOOD PRESSURE: 82 MMHG | HEIGHT: 63 IN | HEART RATE: 83 BPM | SYSTOLIC BLOOD PRESSURE: 136 MMHG | WEIGHT: 112 LBS | TEMPERATURE: 98.5 F | OXYGEN SATURATION: 98 % | RESPIRATION RATE: 18 BRPM

## 2023-02-15 DIAGNOSIS — K26.9 DUODENAL ULCER: Primary | ICD-10-CM

## 2023-02-15 DIAGNOSIS — K90.0 CELIAC DISEASE: ICD-10-CM

## 2023-02-15 PROCEDURE — 1123F ACP DISCUSS/DSCN MKR DOCD: CPT | Performed by: SURGERY

## 2023-02-15 PROCEDURE — 3079F DIAST BP 80-89 MM HG: CPT | Performed by: SURGERY

## 2023-02-15 PROCEDURE — 99204 OFFICE O/P NEW MOD 45 MIN: CPT | Performed by: SURGERY

## 2023-02-15 PROCEDURE — 3075F SYST BP GE 130 - 139MM HG: CPT | Performed by: SURGERY

## 2023-02-15 NOTE — ASSESSMENT & PLAN NOTE
Patient seems to be doing well and there is no evidence that the previously diagnosed ulcer is causing any ongoing issues. I am going to go ahead and set her up for repeat EGD to ensure good healing of the ulcer. I will plan for this in the next few weeks. Risks of the procedure including bleeding, infection, perforation, need for the surgery and anesthesia risk are discussed and consent is obtained. We will continue Protonix and Carafate as currently prescribed for now. As long as we do not find any concerning findings on the repeat EGD will likely discontinue both of these medications.

## 2023-02-15 NOTE — ASSESSMENT & PLAN NOTE
Has known diagnosis of celiac disease. She is adhering to a gluten-free diet. We did discuss the importance of trying to remain gluten-free. We also discussed that the fact that she was not following that diet probably led to the ulcer that we found the cause of her hospitalization. She is continuing to work with a dietitian at her care facility. I have discussed with her that should she have questions or want to meet with a different dietitian I can always get her set up on outpatient basis with the dietitian as well.

## 2023-02-15 NOTE — PROGRESS NOTES
Karley Matamoros is a 69 y.o. female who presents today for follow-up evaluation after recent hospitalization for GI bleed. Patient was taken for EGD and was found to have a duodenal ulcer. During the encounter during the hospitalization turned out that the patient had previously been diagnosed with celiac disease but was not following a gluten-free diet. It was felt that this was probably the cause of the inflammation that led to the ulcer. She was given dietary education during the hospitalization and was also started on Carafate and Protonix which she has continued. Comes in today for follow-up and to discuss repeat EGD to ensure healing of the ulcer. Since her hospitalization she reports that she has been following a gluten-free diet. She lives in a care facility and has had a couple instances where she has had some difficulty with understanding what may be an the meals that are prepared but there is a dietitian that is giving her some guidance at the facility. Has continued her Protonix and Carafate. Denies any abdominal pain. No hematemesis or emesis. No melena. No other complaints. Past Medical History:   Diagnosis Date    Leukoplakia, gingiva     Lung cancer, upper lobe ([address] 2019    Osteopenia        Past Surgical History:   Procedure Laterality Date    BRONCHOSCOPY  05/02/2019    CATARACT REMOVAL Bilateral     COLONOSCOPY  2009    tubular adenoma with low grade dysplasia     TONSILLECTOMY AND ADENOIDECTOMY      TUBAL LIGATION      UPPER GASTROINTESTINAL ENDOSCOPY  11/09/2022    [facility], Dr. Cedric Figueroa       Current Outpatient Medications   Medication Sofiya Rank by Does not apply route 1 each 0    Misc. Devices (STEEL ROLLING WALKER) MISC 1 each by Does not apply route daily Elenker Upright walker, stand up folding Navya Pruittna with 10\" front wheels, padded armrests, seat and backrest 1 each 0    Misc.  Devices (ELONGATED TOILET SEAT ELEVATOR) MISC 1 each by Does not apply route daily 1 each [address] 1959 by Does not apply route ADJUSTING MOTION TWIN BED SYSTEM 1 each 0    Azelastine HCl (ASTEPRO NA) by Nasal route      acetaminophen (TYLENOL) 500 MG tablet Take 2 tablets by mouth every 6 hours as needed for Pain 120 tablet 5    Handicap Placard MISC by Does not apply route EXP 1/23/2028 1 each 0    gabapentin (NEURONTIN) 300 MG capsule take 1 capsule by mouth three times a day 90 capsule 1    vitamin B-12 (CYANOCOBALAMIN) 1000 MCG tablet Take 1,000 mcg by mouth daily      ferrous sulfate (SLOW FE) 142 (45 Fe) MG extended release tablet Take 142 mg by mouth daily      pembrolizumab (KEYTRUDA) 100 MG/4ML SOLN Infuse intravenously      pantoprazole (PROTONIX) 40 MG tablet take 1 tablet by mouth twice a day before meals AT 6:30 AM AND 3 PM      sucralfate (CARAFATE) 1 GM/10ML suspension Take 10 mLs by mouth 4 times daily 1200 mL 3    escitalopram (LEXAPRO) 10 MG tablet take 1 tablet by mouth once daily 90 tablet 3    levothyroxine (SYNTHROID) 25 MCG tablet take 1 tablet by mouth once daily 90 tablet 3    losartan-hydroCHLOROthiazide (HYZAAR) 50-12.5 MG per tablet Take 1 tablet by mouth daily 30 tablet 5    atorvastatin (LIPITOR) 40 MG tablet Take 1 tablet by mouth daily 90 tablet 3    Multiple Vitamin (MULTI-VITAMIN DAILY PO) Take by mouth      lidocaine-prilocaine (EMLA) 2.5-2.5 % cream apply topically TO PORT SITE 60 TO 90  MINS PRIOR TO TREATMENT ADN COVER WITH PLASTIC WRAP  0    Probiotic Product (PROBIOTIC DAILY PO) Take by mouth      Ascorbic Acid (VITAMIN C) 500 MG CAPS Vitamin C TABS  Refills: 0  Active      Calcium Carbonate-Vit D-Min (CALCIUM 1200 PO) Take by mouth       No current facility-administered medications for this visit.        No Known Allergies    Family History   Problem Relation Age of Onset    Cancer Mother         multiple myeloma    Cancer Father         throat cancer    Thyroid Disease Sister     Thyroid Disease Sister     Thyroid Disease Daughter        Social History     Socioeconomic History    Marital status:      Spouse name: Not on file    Number of children: Not on file    Years of education: Not on file    Highest education level: Not on file   Occupational History    Not on file   Tobacco Use    Smoking status: Former     Packs/day: 0.50     Years: 53.00     Pack years: 26.50     Types: Cigarettes     Quit date: 4/1/2019     Years since quitting: 3.8    Smokeless tobacco: Never    Tobacco comments:     up to 1 ppd   Substance and Sexual Activity    Alcohol use: Not on file    Drug use: Not on file    Sexual activity: Not on file   Other Topics Concern    Not on file   Social History Narrative    Not on file     Social Determinants of Health     Financial Resource Strain: Low Risk     Difficulty of Paying Living Expenses: Not hard at all   Food Insecurity: No Food Insecurity    Worried About [address] in the Last Year: Never true    [address]  [place] in the Last Year: Never true   Transportation Needs: No Transportation Needs    Lack of Transportation (Medical): No    Lack of Transportation (Non-Medical):  No   Physical Activity: Inactive    Days of Exercise per Week: 0 days    Minutes of Exercise per Session: 0 min   Stress: Not on file   Social Connections: Not on file   Intimate Partner Violence: Not on file   Housing Stability: Not on file       ROS:   Review of Systems - General ROS: negative  Psychological ROS: negative  Ophthalmic ROS: negative  ENT ROS: negative  Respiratory ROS: no cough, shortness of breath, or wheezing  Cardiovascular ROS: no chest pain or dyspnea on exertion  Gastrointestinal ROS: per HPI  Genito-Urinary ROS: no dysuria, trouble voiding, or hematuria  Musculoskeletal ROS: negative  Dermatological ROS: negative      Objective   Vitals:    02/15/23 0845   BP: 136/82   Pulse: 83   Resp: 18   Temp: 98.5 °F (36.9 °C)   SpO2: 98%     General:in no apparent distress and well developed and well nourished  Eyes: No gross abnormalities. Ears, Nose, Throat: hearing grossly normal bilaterally  Neck: neck supple and non tender without mass  Lungs: clear to auscultation without wheezes or rales   Heart: S1S2, no mumurs, RRR  Abdomen: soft, nontender, no HSM, no guarding, no rebound, no masses  Extremity: negative  Neuro: CN II-XII grossly intact      1. Duodenal ulcer  Assessment & Plan:  Patient seems to be doing well and there is no evidence that the previously diagnosed ulcer is causing any ongoing issues. I am going to go ahead and set her up for repeat EGD to ensure good healing of the ulcer. I will plan for this in the next few weeks. Risks of the procedure including bleeding, infection, perforation, need for the surgery and anesthesia risk are discussed and consent is obtained. We will continue Protonix and Carafate as currently prescribed for now. As long as we do not find any concerning findings on the repeat EGD will likely discontinue both of these medications. 2. Celiac disease  Assessment & Plan:  Has known diagnosis of celiac disease. She is adhering to a gluten-free diet. We did discuss the importance of trying to remain gluten-free. We also discussed that the fact that she was not following that diet probably led to the ulcer that we found the cause of her hospitalization. She is continuing to work with a dietitian at her care facility. I have discussed with her that should she have questions or want to meet with a different dietitian I can always get her set up on outpatient basis with the dietitian as well.        (Please note that portions of this note were completed with a voice recognition program.  Efforts were made to edit the dictations but occasionally words are mis-transcribed.)

## 2023-02-24 ENCOUNTER — TELEPHONE (OUTPATIENT)
Dept: SURGERY | Age: 78
End: 2023-02-24

## 2023-02-28 ENCOUNTER — TELEPHONE (OUTPATIENT)
Dept: FAMILY MEDICINE CLINIC | Age: 78
End: 2023-02-28

## 2023-02-28 NOTE — TELEPHONE ENCOUNTER
Had EGD with Dr Alexis Espinoza and he told her to stop the Carafate and Protonix. They stopped it yesterday. Just wanted you to be aware.

## 2023-03-06 NOTE — TELEPHONE ENCOUNTER
Priti Reed is requesting a refill on the following medication(s):  Requested Prescriptions     Pending Prescriptions Disp Refills    gabapentin (NEURONTIN) 300 MG capsule [Pharmacy Med Name: GABAPENTIN 300 MG CAPSULE] 90 capsule 1     Sig: take 1 capsule by mouth three times a day       Last Visit Date (If Applicable):  1/33/3354    Next Visit Date:    4/24/2023

## 2023-03-07 RX ORDER — GABAPENTIN 300 MG/1
CAPSULE ORAL
Qty: 90 CAPSULE | Refills: 1 | Status: SHIPPED | OUTPATIENT
Start: 2023-03-07 | End: 2023-04-06

## 2023-04-24 ENCOUNTER — OFFICE VISIT (OUTPATIENT)
Dept: FAMILY MEDICINE CLINIC | Age: 78
End: 2023-04-24
Payer: MEDICARE

## 2023-04-24 VITALS
OXYGEN SATURATION: 97 % | HEART RATE: 80 BPM | BODY MASS INDEX: 20.37 KG/M2 | DIASTOLIC BLOOD PRESSURE: 66 MMHG | SYSTOLIC BLOOD PRESSURE: 136 MMHG | WEIGHT: 115 LBS

## 2023-04-24 DIAGNOSIS — E78.2 MIXED HYPERLIPIDEMIA: ICD-10-CM

## 2023-04-24 DIAGNOSIS — I65.23 BILATERAL CAROTID ARTERY STENOSIS: ICD-10-CM

## 2023-04-24 DIAGNOSIS — K90.0 CELIAC DISEASE: ICD-10-CM

## 2023-04-24 DIAGNOSIS — E03.9 ACQUIRED HYPOTHYROIDISM: ICD-10-CM

## 2023-04-24 DIAGNOSIS — C34.91 SQUAMOUS CELL CARCINOMA OF LUNG, STAGE III, RIGHT (HCC): ICD-10-CM

## 2023-04-24 DIAGNOSIS — I10 ESSENTIAL HYPERTENSION: Primary | ICD-10-CM

## 2023-04-24 DIAGNOSIS — D50.0 BLOOD LOSS ANEMIA: ICD-10-CM

## 2023-04-24 PROCEDURE — 3078F DIAST BP <80 MM HG: CPT | Performed by: FAMILY MEDICINE

## 2023-04-24 PROCEDURE — 99214 OFFICE O/P EST MOD 30 MIN: CPT | Performed by: FAMILY MEDICINE

## 2023-04-24 PROCEDURE — 3074F SYST BP LT 130 MM HG: CPT | Performed by: FAMILY MEDICINE

## 2023-04-24 PROCEDURE — 99212 OFFICE O/P EST SF 10 MIN: CPT | Performed by: FAMILY MEDICINE

## 2023-04-24 PROCEDURE — 1123F ACP DISCUSS/DSCN MKR DOCD: CPT | Performed by: FAMILY MEDICINE

## 2023-04-24 SDOH — ECONOMIC STABILITY: FOOD INSECURITY: WITHIN THE PAST 12 MONTHS, YOU WORRIED THAT YOUR FOOD WOULD RUN OUT BEFORE YOU GOT MONEY TO BUY MORE.: NEVER TRUE

## 2023-04-24 SDOH — ECONOMIC STABILITY: HOUSING INSECURITY
IN THE LAST 12 MONTHS, WAS THERE A TIME WHEN YOU DID NOT HAVE A STEADY PLACE TO SLEEP OR SLEPT IN A SHELTER (INCLUDING NOW)?: NO

## 2023-04-24 SDOH — ECONOMIC STABILITY: INCOME INSECURITY: HOW HARD IS IT FOR YOU TO PAY FOR THE VERY BASICS LIKE FOOD, HOUSING, MEDICAL CARE, AND HEATING?: NOT HARD AT ALL

## 2023-04-24 SDOH — ECONOMIC STABILITY: FOOD INSECURITY: WITHIN THE PAST 12 MONTHS, THE FOOD YOU BOUGHT JUST DIDN'T LAST AND YOU DIDN'T HAVE MONEY TO GET MORE.: NEVER TRUE

## 2023-04-24 NOTE — PROGRESS NOTES
[address] [facility]  [address]  Dept: [phone]  Dept VAC:[phone]    Brigida Choudhary is a 69 y.o. female who presents today for her medical conditions/complaints as notedbelow. Brigida Choudhary is c/o of Hypertension (3mo follow up)    Assessment/Plan:     1. Essential hypertension  2. Blood loss anemia  3. Squamous cell carcinoma of lung, stage III, right ([address]  4. Celiac disease  5. Acquired hypothyroidism  6. Bilateral carotid artery stenosis  7. Mixed hyperlipidemia  -     Lipid Panel; Future    Hypertension is asymptomatic well-controlled at this time. No changes in her current medications. Continue with the lisinopril and hydrochlorothiazide combination at that dosage    Celiac disease is currently asymptomatic. The importance of a continued diet reviewed. Continue to follow hemoglobin regularly with the oncology labs. Can wean down on PPI as instructed by general surgery. Hypothyroidism has been asymptomatic. Recheck labs in 6 months. Hyperlipidemia asymptomatic. No new signs of carotid disease with no new neurologic symptoms. Continue on atorvastatin for respect medication. Recheck labs with her next oncology labs. Lab Results   Component Value Date    WBC 7.8 04/19/2023    HGB 10.6 (L) 04/19/2023    HCT 33.5 (L) 04/19/2023     04/19/2023    CHOL 135 01/20/2022    TRIG 63 01/20/2022    HDL 67 01/20/2022    ALT 17 04/19/2023    AST 23 04/19/2023     04/19/2023    K 4.2 04/19/2023     04/19/2023    CREATININE 0.9 04/19/2023    BUN 26 (H) 04/19/2023    CO2 24 04/19/2023    TSH 0.89 11/22/2022    INR 1.0 12/21/2022    LABA1C 6.1 07/21/2022    LABA1C 5.8 03/24/2021       Return in about 6 months (around 10/24/2023) for Medication recheck. Subjective:      HPI:     HPI    Has been adjusting to the assisted living. Gluten free diet very well now at the assisted living. Has been able to follow this pretty well.

## 2023-05-04 LAB
CHOLESTEROL/HDL RATIO: 1.83 RATIO (ref 0–4.5)
CHOLESTEROL: 121 MG/DL (ref 50–200)
HDLC SERPL-MCNC: 66 MG/DL (ref 36–68)
LDL CHOLESTEROL CALCULATED: 46.4 MG/DL (ref 0–160)
TRIGL SERPL-MCNC: 43 MG/DL (ref 10–250)
VLDLC SERPL CALC-MCNC: 9 MG/DL (ref 0–50)

## 2023-05-08 RX ORDER — GABAPENTIN 300 MG/1
CAPSULE ORAL
Qty: 90 CAPSULE | Refills: 1 | Status: SHIPPED | OUTPATIENT
Start: 2023-05-08 | End: 2023-06-07

## 2023-05-08 RX ORDER — LOSARTAN POTASSIUM AND HYDROCHLOROTHIAZIDE 12.5; 5 MG/1; MG/1
TABLET ORAL
Qty: 180 TABLET | Refills: 1 | Status: SHIPPED | OUTPATIENT
Start: 2023-05-08

## 2023-05-08 NOTE — TELEPHONE ENCOUNTER
Misael Franklin is requesting a refill on the following medication(s):  Requested Prescriptions     Pending Prescriptions Disp Refills    losartan-hydroCHLOROthiazide (HYZAAR) 50-12.5 MG per tablet [Pharmacy Med Name: LOSARTAN-HCTZ 50-12.5 MG TAB] 180 tablet 1     Sig: take 1 tablet by mouth once daily    gabapentin (NEURONTIN) 300 MG capsule [Pharmacy Med Name: GABAPENTIN 300 MG CAPSULE] 90 capsule 1     Sig: take 1 capsule by mouth three times a day       Last Visit Date (If Applicable):  1/14/4666    Next Visit Date:    10/23/2023

## 2023-06-19 DIAGNOSIS — R30.0 DYSURIA: ICD-10-CM

## 2023-06-28 ENCOUNTER — TELEPHONE (OUTPATIENT)
Dept: FAMILY MEDICINE CLINIC | Age: 78
End: 2023-06-28

## 2023-06-28 DIAGNOSIS — H91.93 BILATERAL HEARING LOSS, UNSPECIFIED HEARING LOSS TYPE: Primary | ICD-10-CM

## 2023-07-03 RX ORDER — GABAPENTIN 300 MG/1
CAPSULE ORAL
Qty: 90 CAPSULE | Refills: 1 | Status: SHIPPED | OUTPATIENT
Start: 2023-07-03 | End: 2023-08-02

## 2023-07-03 RX ORDER — ATORVASTATIN CALCIUM 40 MG/1
TABLET, FILM COATED ORAL
Qty: 90 TABLET | Refills: 3 | Status: SHIPPED | OUTPATIENT
Start: 2023-07-03

## 2023-07-03 NOTE — TELEPHONE ENCOUNTER
Ritu Lama is requesting a refill on the following medication(s):  Requested Prescriptions     Pending Prescriptions Disp Refills    atorvastatin (LIPITOR) 40 MG tablet [Pharmacy Med Name: ATORVASTATIN 40 MG TABLET] 90 tablet 3     Sig: take 1 tablet by mouth once daily    gabapentin (NEURONTIN) 300 MG capsule [Pharmacy Med Name: GABAPENTIN 300 MG CAPSULE] 90 capsule 1     Sig: take 1 capsule by mouth three times a day       Last Visit Date (If Applicable):  3/51/6998    Next Visit Date:    10/23/2023

## 2023-08-02 ENCOUNTER — OFFICE VISIT (OUTPATIENT)
Dept: FAMILY MEDICINE CLINIC | Age: 78
End: 2023-08-02
Payer: MEDICARE

## 2023-08-02 VITALS
WEIGHT: 112 LBS | HEART RATE: 95 BPM | SYSTOLIC BLOOD PRESSURE: 134 MMHG | OXYGEN SATURATION: 98 % | DIASTOLIC BLOOD PRESSURE: 66 MMHG | BODY MASS INDEX: 19.84 KG/M2

## 2023-08-02 DIAGNOSIS — R31.9 HEMATURIA, UNSPECIFIED TYPE: ICD-10-CM

## 2023-08-02 DIAGNOSIS — M54.50 LOW BACK PAIN WITHOUT SCIATICA, UNSPECIFIED BACK PAIN LATERALITY, UNSPECIFIED CHRONICITY: Primary | ICD-10-CM

## 2023-08-02 DIAGNOSIS — N39.0 RECURRENT UTI: ICD-10-CM

## 2023-08-02 LAB
BACTERIA, URINE: ABNORMAL /HPF
BILIRUBIN URINE: NEGATIVE
BILIRUBIN, POC: NEGATIVE
BLOOD URINE, POC: NORMAL
BLOOD, URINE: ABNORMAL
CASTS UA: ABNORMAL /LPF
CLARITY, POC: NORMAL
CLARITY: ABNORMAL
COLOR, POC: NORMAL
COLOR, URINE: YELLOW
CRYSTALS, UA: ABNORMAL
GLUCOSE URINE, POC: NEGATIVE
GLUCOSE URINE: NEGATIVE MG/DL
KETONES, POC: NEGATIVE
KETONES, URINE: NEGATIVE MG/DL
LEUKOCYTE EST, POC: NORMAL
LEUKOCYTE ESTERASE, URINE: ABNORMAL
NITRITE, POC: NEGATIVE
NITRITE, URINE: NEGATIVE
PH UA: 6.5 (ref 5–8.5)
PH, POC: 6.5
PROTEIN UA: ABNORMAL MG/DL
PROTEIN, POC: NORMAL
RBC URINE: ABNORMAL /HPF (ref 0–2)
SPECIFIC GRAVITY, POC: 1.01
SPECIFIC GRAVITY, URINE: 1.01 MG/DL (ref 1–1.03)
SQUAMOUS EPITHELIAL: ABNORMAL /HPF
UROBILINOGEN, POC: 0.2
UROBILINOGEN, URINE: 0.2 MG/DL (ref 0.2–1)
WBC URINE: >100 /HPF (ref 0–4)
YEAST, URINE: ABNORMAL

## 2023-08-02 PROCEDURE — 3078F DIAST BP <80 MM HG: CPT | Performed by: FAMILY MEDICINE

## 2023-08-02 PROCEDURE — PBSHW POCT URINALYSIS DIPSTICK: Performed by: FAMILY MEDICINE

## 2023-08-02 PROCEDURE — 3074F SYST BP LT 130 MM HG: CPT | Performed by: FAMILY MEDICINE

## 2023-08-02 PROCEDURE — 99214 OFFICE O/P EST MOD 30 MIN: CPT | Performed by: FAMILY MEDICINE

## 2023-08-02 PROCEDURE — 99058 OFFICE EMERGENCY CARE: CPT | Performed by: FAMILY MEDICINE

## 2023-08-02 PROCEDURE — 81002 URINALYSIS NONAUTO W/O SCOPE: CPT | Performed by: FAMILY MEDICINE

## 2023-08-02 PROCEDURE — 1123F ACP DISCUSS/DSCN MKR DOCD: CPT | Performed by: FAMILY MEDICINE

## 2023-08-02 PROCEDURE — 99211 OFF/OP EST MAY X REQ PHY/QHP: CPT | Performed by: FAMILY MEDICINE

## 2023-08-02 RX ORDER — CEFDINIR 300 MG/1
300 CAPSULE ORAL 2 TIMES DAILY
Qty: 20 CAPSULE | Refills: 0 | Status: SHIPPED | OUTPATIENT
Start: 2023-08-02 | End: 2023-08-12

## 2023-08-02 NOTE — PROGRESS NOTES
[address] [facility]  [address] [facility], [address] [facility], [address]  Dept: [phone]  Dept [facility]:[phone]    Araceli Iqbal is a 69 y.o. female who presents today for her medical conditions/complaints as notedbelow. Araceli Iqbal is c/o of Back Pain (Patient was an oncology this morning and her WBC count was elevated. They questioned her about any s/s of infection and she mentioned she has frequent UTIs and is having back pain and fatigue. Patient here for UA. )      Assessment/Plan:     1. Low back pain without sciatica, unspecified back pain laterality, unspecified chronicity  -     POCT Urinalysis no Micro  -     Urinalysis with Reflex to Culture; Future  -     Culture, Urine; Future  2. Hematuria, unspecified type  -     Urinalysis with Reflex to Culture; Future  -     Culture, Urine; Future  3. Recurrent UTI  -     cefdinir (OMNICEF) 300 MG capsule; Take 1 capsule by mouth 2 times daily for 10 days, Disp-20 capsule, R-0Normal    Discussed importance of the pushing the fluids, tylenol as needed for discomfort. If sx not improving, fevers, nausea vomiting return for reevaluation urgently. Patient worked in as an emergency same day office visit due to acute nature of her illness via double booking. Lab Results   Component Value Date    WBC 11.5 (H) 08/02/2023    HGB 10.6 (L) 08/02/2023    HCT 35.6 (L) 08/02/2023    .6 (H) 08/02/2023    CHOL 121 05/04/2023    TRIG 43 05/04/2023    HDL 66 05/04/2023    ALT 18 08/02/2023    AST 23 08/02/2023     08/02/2023    K 4.3 08/02/2023     08/02/2023    CREATININE 0.9 08/02/2023    BUN 24 (H) 08/02/2023    CO2 24 08/02/2023    TSH 0.89 11/22/2022    INR 1.0 12/21/2022    LABA1C 6.1 07/21/2022    LABA1C 5.8 03/24/2021       Return for As scheduled. Subjective:      HPI:     HPI  Back Pain (Patient was an oncology this morning and her WBC count was elevated.  They questioned her about any s/s of infection and she

## 2023-08-04 DIAGNOSIS — R31.9 HEMATURIA, UNSPECIFIED TYPE: ICD-10-CM

## 2023-08-04 DIAGNOSIS — M54.50 LOW BACK PAIN WITHOUT SCIATICA, UNSPECIFIED BACK PAIN LATERALITY, UNSPECIFIED CHRONICITY: ICD-10-CM

## 2023-08-07 ENCOUNTER — TELEPHONE (OUTPATIENT)
Dept: FAMILY MEDICINE CLINIC | Age: 78
End: 2023-08-07

## 2023-08-07 NOTE — TELEPHONE ENCOUNTER
Received urine culture results. Scanned into epic. Spoke with patient - she states she is feeling good. She is no longer having any symptoms.

## 2023-09-05 RX ORDER — GABAPENTIN 300 MG/1
CAPSULE ORAL
Qty: 180 CAPSULE | Refills: 1 | Status: SHIPPED | OUTPATIENT
Start: 2023-09-05 | End: 2023-10-05

## 2023-09-05 NOTE — TELEPHONE ENCOUNTER
Ritu Lama is requesting a refill on the following medication(s):  Requested Prescriptions     Pending Prescriptions Disp Refills    gabapentin (NEURONTIN) 300 MG capsule [Pharmacy Med Name: GABAPENTIN 300 MG CAPSULE] 180 capsule 1     Sig: take 1 capsule by mouth three times a day       Last Visit Date (If Applicable):  9/1/1876    Next Visit Date:    10/23/2023

## 2023-10-23 ENCOUNTER — OFFICE VISIT (OUTPATIENT)
Dept: FAMILY MEDICINE CLINIC | Age: 78
End: 2023-10-23
Payer: MEDICARE

## 2023-10-23 VITALS
WEIGHT: 114 LBS | SYSTOLIC BLOOD PRESSURE: 130 MMHG | HEART RATE: 85 BPM | OXYGEN SATURATION: 97 % | BODY MASS INDEX: 20.19 KG/M2 | DIASTOLIC BLOOD PRESSURE: 70 MMHG

## 2023-10-23 DIAGNOSIS — C34.12 MALIGNANT NEOPLASM OF UPPER LOBE OF LEFT LUNG (HCC): ICD-10-CM

## 2023-10-23 DIAGNOSIS — N18.32 STAGE 3B CHRONIC KIDNEY DISEASE (HCC): ICD-10-CM

## 2023-10-23 DIAGNOSIS — I10 ESSENTIAL HYPERTENSION: Primary | ICD-10-CM

## 2023-10-23 DIAGNOSIS — E03.9 ACQUIRED HYPOTHYROIDISM: ICD-10-CM

## 2023-10-23 PROCEDURE — 99214 OFFICE O/P EST MOD 30 MIN: CPT | Performed by: FAMILY MEDICINE

## 2023-10-23 PROCEDURE — 3078F DIAST BP <80 MM HG: CPT | Performed by: FAMILY MEDICINE

## 2023-10-23 PROCEDURE — 3074F SYST BP LT 130 MM HG: CPT | Performed by: FAMILY MEDICINE

## 2023-10-23 PROCEDURE — 1123F ACP DISCUSS/DSCN MKR DOCD: CPT | Performed by: FAMILY MEDICINE

## 2023-10-23 PROCEDURE — 99212 OFFICE O/P EST SF 10 MIN: CPT | Performed by: FAMILY MEDICINE

## 2023-10-23 NOTE — PROGRESS NOTES
mouth every 6 hours as needed for Pain 120 tablet 5    vitamin B-12 (CYANOCOBALAMIN) 1000 MCG tablet Take 1 tablet by mouth daily      ferrous sulfate (SLOW FE) 142 (45 Fe) MG extended release tablet Take 142 mg by mouth daily      pembrolizumab (KEYTRUDA) 100 MG/4ML SOLN Infuse intravenously      escitalopram (LEXAPRO) 10 MG tablet take 1 tablet by mouth once daily 90 tablet 3    levothyroxine (SYNTHROID) 25 MCG tablet take 1 tablet by mouth once daily 90 tablet 3    lidocaine-prilocaine (EMLA) 2.5-2.5 % cream apply topically TO PORT SITE 60 TO 90  MINS PRIOR TO TREATMENT ADN COVER WITH PLASTIC WRAP  0    Probiotic Product (PROBIOTIC DAILY PO) Take by mouth      Ascorbic Acid (VITAMIN C) 500 MG CAPS Vitamin C TABS  Refills: 0  Active      Calcium Carbonate-Vit D-Min (CALCIUM 1200 PO) Take by mouth      Lift Chair MISC by Does not apply route 1 each 0    Misc. Devices (STEEL ROLLING WALKER) MISC 1 each by Does not apply route daily Elenker Upright walker, stand up folding Pablito Rideau with 10\" front wheels, padded armrests, seat and backrest 1 each 0    Misc. Devices (ELONGATED TOILET SEAT ELEVATOR) MISC 1 each by Does not apply route daily 1 each 1    Hospital Bed MISC by Does not apply route ADJUSTING MOTION TWIN BED SYSTEM 1 each 0    Handicap Placard MISC by Does not apply route EXP 1/23/2028 1 each 0     No current facility-administered medications for this visit.      Allergies   Allergen Reactions    Tizanidine        Health Maintenance   Topic Date Due    COVID-19 Vaccine (5 - Moderna series) 07/30/2022    Flu vaccine (1) 08/01/2023    Depression Monitoring  01/23/2024    Annual Wellness Visit (AWV)  01/24/2024    Lipids  05/04/2024    DTaP/Tdap/Td vaccine (3 - Td or Tdap) 03/11/2031    DEXA (modify frequency per FRAX score)  Completed    Shingles vaccine  Completed    Pneumococcal 65+ years Vaccine  Completed    Hepatitis C screen  Completed    Hepatitis A vaccine  Aged Out    Hepatitis B vaccine  Aged

## 2023-10-25 ENCOUNTER — TELEPHONE (OUTPATIENT)
Dept: FAMILY MEDICINE CLINIC | Age: 78
End: 2023-10-25

## 2023-10-25 DIAGNOSIS — I10 PRIMARY HYPERTENSION: Primary | ICD-10-CM

## 2023-10-25 RX ORDER — LOSARTAN POTASSIUM 100 MG/1
100 TABLET ORAL DAILY
Qty: 30 TABLET | Refills: 5 | Status: SHIPPED | OUTPATIENT
Start: 2023-10-25 | End: 2023-10-26 | Stop reason: SDUPTHER

## 2023-10-25 NOTE — TELEPHONE ENCOUNTER
Elzbieta's labs show recurrent low sodium. This could be related to her diuretic and her blood pressure medicine the losartan HCTZ. I would like her to stop the losartan HCTZ. I would like her to continue on just the losartan. She can increase the losartan to 100 mg daily. [place] [facility] can fax me those numbers for the next 3 weeks. She will have repeat labs with oncology in the next several weeks to verify that this is improving things. New prescription sent to the pharmacy.

## 2023-10-26 ENCOUNTER — TELEPHONE (OUTPATIENT)
Dept: FAMILY MEDICINE CLINIC | Age: 78
End: 2023-10-26

## 2023-10-26 DIAGNOSIS — I10 PRIMARY HYPERTENSION: ICD-10-CM

## 2023-10-26 RX ORDER — LOSARTAN POTASSIUM 100 MG/1
100 TABLET ORAL DAILY
Qty: 90 TABLET | Refills: 1 | Status: SHIPPED | OUTPATIENT
Start: 2023-10-26

## 2023-10-26 NOTE — TELEPHONE ENCOUNTER
Bard Wagner is calling to request a refill on the following medication(s):  Requested Prescriptions     Pending Prescriptions Disp Refills    losartan (COZAAR) 100 MG tablet 90 tablet 1     Sig: Take 1 tablet by mouth daily       Last Visit Date (If Applicable):  88/83/0570    Next Visit Date:    Visit date not found

## 2023-11-08 RX ORDER — LEVOTHYROXINE SODIUM 0.03 MG/1
TABLET ORAL
Qty: 90 TABLET | Refills: 3 | Status: SHIPPED | OUTPATIENT
Start: 2023-11-08

## 2023-11-08 NOTE — TELEPHONE ENCOUNTER
Trevor Soto is requesting a refill on the following medication(s):  Requested Prescriptions     Pending Prescriptions Disp Refills    levothyroxine (SYNTHROID) 25 MCG tablet [Pharmacy Med Name: LEVOTHYROXINE 25 MCG TABLET] 90 tablet 3     Sig: take 1 tablet by mouth once daily       Last Visit Date (If Applicable):  42/37/8958    Next Visit Date:    4/25/2024

## 2023-11-20 ENCOUNTER — TELEPHONE (OUTPATIENT)
Dept: FAMILY MEDICINE CLINIC | Age: 78
End: 2023-11-20

## 2023-11-20 NOTE — TELEPHONE ENCOUNTER
Blood pressure is reasonably controlled with the recent blood pressure changes. HCTZ was discontinued and the losartan was increased to 100 mg. We will leave these doses at the same no changes indicated.

## 2023-11-25 DIAGNOSIS — F41.9 ANXIETY: ICD-10-CM

## 2023-11-27 RX ORDER — ESCITALOPRAM OXALATE 10 MG/1
TABLET ORAL
Qty: 90 TABLET | Refills: 3 | Status: SHIPPED | OUTPATIENT
Start: 2023-11-27

## 2023-11-27 NOTE — TELEPHONE ENCOUNTER
Genie Pickard is requesting a refill on the following medication(s):  Requested Prescriptions     Pending Prescriptions Disp Refills    escitalopram (LEXAPRO) 10 MG tablet [Pharmacy Med Name: ESCITALOPRAM 10 MG TABLET] 90 tablet 3     Sig: take 1 tablet by mouth once daily       Last Visit Date (If Applicable):  85/84/6149    Next Visit Date:    4/25/2024 Return ob. Doing well   Here with .   F/u in 3-4 weeks for DMS/Hemoglobin and TDAP

## 2024-01-02 NOTE — TELEPHONE ENCOUNTER
Elzbieta Dash is requesting a refill on the following medication(s):  Requested Prescriptions     Pending Prescriptions Disp Refills    gabapentin (NEURONTIN) 300 MG capsule [Pharmacy Med Name: GABAPENTIN 300 MG CAPSULE] 180 capsule 1     Sig: take 1 capsule by mouth three times a day       Last Visit Date (If Applicable):  10/23/2023    Next Visit Date:    4/25/2024

## 2024-01-03 RX ORDER — GABAPENTIN 300 MG/1
CAPSULE ORAL
Qty: 180 CAPSULE | Refills: 0 | Status: SHIPPED | OUTPATIENT
Start: 2024-01-03 | End: 2024-02-02

## 2024-02-15 ENCOUNTER — OFFICE VISIT (OUTPATIENT)
Dept: FAMILY MEDICINE CLINIC | Age: 79
End: 2024-02-15
Payer: MEDICARE

## 2024-02-15 VITALS
BODY MASS INDEX: 20.37 KG/M2 | DIASTOLIC BLOOD PRESSURE: 72 MMHG | OXYGEN SATURATION: 98 % | SYSTOLIC BLOOD PRESSURE: 126 MMHG | HEART RATE: 88 BPM | WEIGHT: 115 LBS

## 2024-02-15 DIAGNOSIS — H60.393 OTHER INFECTIVE ACUTE OTITIS EXTERNA OF BOTH EARS: Primary | ICD-10-CM

## 2024-02-15 PROCEDURE — 3078F DIAST BP <80 MM HG: CPT | Performed by: FAMILY MEDICINE

## 2024-02-15 PROCEDURE — 3074F SYST BP LT 130 MM HG: CPT | Performed by: FAMILY MEDICINE

## 2024-02-15 PROCEDURE — 99212 OFFICE O/P EST SF 10 MIN: CPT | Performed by: FAMILY MEDICINE

## 2024-02-15 PROCEDURE — 99213 OFFICE O/P EST LOW 20 MIN: CPT | Performed by: FAMILY MEDICINE

## 2024-02-15 PROCEDURE — 1123F ACP DISCUSS/DSCN MKR DOCD: CPT | Performed by: FAMILY MEDICINE

## 2024-02-15 RX ORDER — CIPROFLOXACIN AND DEXAMETHASONE 3; 1 MG/ML; MG/ML
4 SUSPENSION/ DROPS AURICULAR (OTIC) 2 TIMES DAILY
Qty: 7.5 ML | Refills: 1 | Status: SHIPPED | OUTPATIENT
Start: 2024-02-15 | End: 2024-02-25

## 2024-02-15 ASSESSMENT — PATIENT HEALTH QUESTIONNAIRE - PHQ9
2. FEELING DOWN, DEPRESSED OR HOPELESS: 0
SUM OF ALL RESPONSES TO PHQ QUESTIONS 1-9: 0
SUM OF ALL RESPONSES TO PHQ QUESTIONS 1-9: 0
SUM OF ALL RESPONSES TO PHQ9 QUESTIONS 1 & 2: 0
1. LITTLE INTEREST OR PLEASURE IN DOING THINGS: 0
SUM OF ALL RESPONSES TO PHQ QUESTIONS 1-9: 0
SUM OF ALL RESPONSES TO PHQ QUESTIONS 1-9: 0

## 2024-02-15 NOTE — PROGRESS NOTES
[address], [address]  [place] [id number]  Dept: [phone]  Dept Fax:[phone]    Elzbieta Dash is a 78 y.o. female who presents today for her medical conditions/complaints as notedbelow.  Elzbieta Dash is c/o of Ear Drainage (Pt states that she has noticed both ears are bothering her. Pt states it is a little harder to hear. Pt states this has been going on for about a month.)      Assessment/Plan:     1. Other infective acute otitis externa of both ears    - ciprofloxacin-dexAMETHasone (CIPRODEX) 0.3-0.1 % otic suspension; Place 4 drops into both ears 2 times daily for 10 days  Dispense: 7.5 mL; Refill: 1    Use the ear drops to soften and loosen any wax as well as to help with the edema.  Try to leave the hearing aides out when possible to allow ears to dry and promote healing.     Lab Results   Component Value Date    WBC 9.8 02/07/2024    HGB 11.4 (L) 02/07/2024    HCT 37.4 02/07/2024    .5 02/07/2024    CHOL 121 05/04/2023    TRIG 43 05/04/2023    HDL 66 05/04/2023    ALT 24 02/07/2024    AST 29 02/07/2024     02/07/2024    K 4.2 02/07/2024     02/07/2024    CREATININE 0.9 02/07/2024    BUN 17 02/07/2024    CO2 22 02/07/2024    TSH 0.89 11/22/2022    INR 1.0 12/21/2022    LABA1C 6.1 07/21/2022    LABA1C 5.8 03/24/2021       No follow-ups on file.        Subjective:      HPI:     HPI  Ear Drainage (Pt states that she has noticed both ears are bothering her. Pt states it is a little harder to hear. Pt states this has been going on for about a month.)  Did see audiology-- they thought she had a lot of wax      BP Readings from Last 3 Encounters:   02/15/24 126/72   10/23/23 130/70   08/02/23 134/66          (goal 120/80)    Wt Readings from Last 3 Encounters:   02/15/24 52.2 kg (115 lb)   10/23/23 51.7 kg (114 lb)   08/02/23 50.8 kg (112 lb)        Past Medical History:   Diagnosis Date    Leukoplakia, gingiva     Lung cancer, upper lobe

## 2024-02-25 PROBLEM — N18.32 STAGE 3B CHRONIC KIDNEY DISEASE (HCC): Status: ACTIVE | Noted: 2022-07-21

## 2024-02-26 RX ORDER — GABAPENTIN 300 MG/1
CAPSULE ORAL
Qty: 180 CAPSULE | Refills: 0 | Status: SHIPPED | OUTPATIENT
Start: 2024-02-26 | End: 2024-03-27

## 2024-02-26 NOTE — TELEPHONE ENCOUNTER
Elzbieta Dash is requesting a refill on the following medication(s):  Requested Prescriptions     Pending Prescriptions Disp Refills    gabapentin (NEURONTIN) 300 MG capsule [Pharmacy Med Name: GABAPENTIN 300 MG CAPSULE] 180 capsule 0     Sig: take 1 capsule by mouth three times a day       Last Visit Date (If Applicable):  2/15/2024    Next Visit Date:    4/25/2024

## 2024-03-11 RX ORDER — FLUTICASONE PROPIONATE 50 MCG
SPRAY, SUSPENSION (ML) NASAL
Qty: 48 G | Refills: 3 | Status: SHIPPED | OUTPATIENT
Start: 2024-03-11

## 2024-03-11 NOTE — TELEPHONE ENCOUNTER
Elzbieta Dash is requesting a refill on the following medication(s):  Requested Prescriptions     Pending Prescriptions Disp Refills    fluticasone (FLONASE) 50 MCG/ACT nasal spray [Pharmacy Med Name: FLUTICASONE PROP 50 MCG SPRAY] 48 g 3     Sig: instill 2 spray into each nostril once daily       Last Visit Date (If Applicable):  2/15/2024    Next Visit Date:    4/25/2024

## 2024-03-18 ENCOUNTER — TELEPHONE (OUTPATIENT)
Dept: FAMILY MEDICINE CLINIC | Age: 79
End: 2024-03-18

## 2024-03-18 NOTE — TELEPHONE ENCOUNTER
Can continue to monitor until morning.  If not keeping in even water then would send to ER for evaluation for fluids giovanni if dizzy, significant weakness.

## 2024-03-19 LAB
BACTERIA, URINE: ABNORMAL /HPF
BASE EXCESS VENOUS: -3.5
BILIRUBIN URINE: NEGATIVE
BLOOD, URINE: ABNORMAL
CASTS UA: ABNORMAL /LPF
CLARITY: ABNORMAL
COLOR, URINE: YELLOW
CRYSTALS, UA: ABNORMAL
GLUCOSE URINE: NEGATIVE MG/DL
HCO3 VENOUS: 22.5 MMOL/L (ref 23–27)
KETONES, URINE: NEGATIVE MG/DL
LACTIC ACID: 1.4 MMOL/L (ref 0.7–1.9)
LEUKOCYTE ESTERASE, URINE: NEGATIVE
MUCUS, URINE: ABNORMAL
NITRITE, URINE: NEGATIVE
O2 SAT VENOUS: 44.7 % (ref 94–99)
PCO2 VENOUS: 42.7 MMHG (ref 42–50)
PH UA: 6 (ref 5–8.5)
PH VENOUS: 7.33 PH (ref 7.32–7.42)
PO2 VENOUS: 26.6 MMHG (ref 25–40)
PROTEIN UA: ABNORMAL MG/DL
RBC URINE: ABNORMAL /HPF (ref 0–2)
SPECIFIC GRAVITY, URINE: 1.02 MG/DL (ref 1–1.03)
SQUAMOUS EPITHELIAL: ABNORMAL /HPF
TCO2 VENOUS: 22.2 MMOL/L (ref 22–26)
TROPONIN I: < 0.012 NG/ML (ref 0–0.03)
UROBILINOGEN, URINE: 0.2 MG/DL (ref 0.2–1)
WBC URINE: ABNORMAL /HPF (ref 0–4)
YEAST, URINE: ABNORMAL

## 2024-03-20 LAB
ANION GAP SERPL CALCULATED.3IONS-SCNC: 1.2 MMOL/L (ref 3–11)
BASOPHILS %: 2.32 (ref 0–3)
BASOPHILS ABSOLUTE: 0.17 (ref 0–0.3)
BUN BLDV-MCNC: 17 MG/DL (ref 7–17)
CALCIUM SERPL-MCNC: 9.3 MG/DL (ref 8.4–10.2)
CHLORIDE BLD-SCNC: 108 MMOL/L (ref 98–120)
CO2: 25 MMOL/L (ref 22–31)
CREAT SERPL-MCNC: 0.9 MG/DL (ref 0.5–1)
CREATININE CLEARANCE: 37.45
EOSINOPHILS %: 10.43 (ref 0–10)
EOSINOPHILS ABSOLUTE: 0.76 (ref 0–1.1)
GFR CALCULATED: > 60
GLUCOSE: 92 MG/DL (ref 65–105)
HCT VFR BLD CALC: 34.6 % (ref 37–47)
HEMOGLOBIN: 10.8 (ref 12–16)
LYMPHOCYTE %: 15.12 (ref 20–51.1)
LYMPHOCYTES ABSOLUTE: 1.1 (ref 1–5.5)
MAGNESIUM: 1.9 MG/DL (ref 1.6–2.3)
MCH RBC QN AUTO: 30.5 PG (ref 28.5–32.5)
MCHC RBC AUTO-ENTMCNC: 31.2 G/DL (ref 32–37)
MCV RBC AUTO: 97.7 FL (ref 80–94)
MONOCYTES %: 14.66 (ref 1.7–9.3)
MONOCYTES ABSOLUTE: 1.07 (ref 0.1–1)
NEUTROPHILS %: 57.47 (ref 42.2–75.2)
NEUTROPHILS ABSOLUTE: 4.18 (ref 2–8.1)
PDW BLD-RTO: 12.6 % (ref 8.5–15.5)
PLATELET # BLD: 250.8 THOU/MM3 (ref 130–400)
POTASSIUM SERPL-SCNC: 4.2 MMOL/L (ref 3.6–5)
RBC: 3.54 M/UL (ref 4.2–5.4)
SODIUM BLD-SCNC: 135 MMOL/L (ref 135–145)
WBC: 7.3 THOU/ML3 (ref 4.8–10.8)

## 2024-03-21 ENCOUNTER — TELEPHONE (OUTPATIENT)
Dept: FAMILY MEDICINE CLINIC | Age: 79
End: 2024-03-21

## 2024-03-22 NOTE — TELEPHONE ENCOUNTER
Care Transitions Initial Follow Up Call    Outreach made within 2 business days of discharge: Yes    Patient: Elzbieta Dash Patient : 1945   MRN: [id number]  Reason for Admission: dizziness, UTI, hypoxia  Discharge Date:  3/21/24     Spoke with: Marga    Discharge department/facility: [facility] Interactive Patient Contact:  Was patient able to fill all prescriptions: Yes  Was patient instructed to bring all medications to the follow-up visit: Yes  Is patient taking all medications as directed in the discharge summary? Yes  Does patient understand their discharge instructions: Yes  Does patient have questions or concerns that need addressed prior to 7-14 day follow up office visit: no    Scheduled appointment with PCP within 7-14 days    Follow Up  Future Appointments   Date Time Provider Department Center   3/27/2024  1:20 PM Chaparrita Bermudez MD [facility]   2024  9:40 AM Chaparrita Bermudez MD [facility] L[facility]

## 2024-03-25 ENCOUNTER — TELEPHONE (OUTPATIENT)
Dept: FAMILY MEDICINE CLINIC | Age: 79
End: 2024-03-25

## 2024-03-25 NOTE — TELEPHONE ENCOUNTER
Patient was started on oxygen and she is currently only using it at night when she sleeps. She is currently on 2L. For the past several days patient c/o feeling dizzy (not room spinning- more of a lightheaaded feeling) first thing in the morning. It will get considerable better in 2-3 hours. Marga wonders if she is getting too much oxygen - could the dizziness be from the oxygen?  Or is she still getting over her ear issue and UTI?  Rosa is going to take vitals and send over via fax.      Per Dr Bermudez Cut down to 1L at night. Make sure she is getting plenty of fluids.  Rosa notified. Marga notified.

## 2024-03-27 ENCOUNTER — OFFICE VISIT (OUTPATIENT)
Dept: FAMILY MEDICINE CLINIC | Age: 79
End: 2024-03-27

## 2024-03-27 VITALS
WEIGHT: 115 LBS | HEIGHT: 63 IN | DIASTOLIC BLOOD PRESSURE: 68 MMHG | SYSTOLIC BLOOD PRESSURE: 126 MMHG | HEART RATE: 88 BPM | OXYGEN SATURATION: 99 % | BODY MASS INDEX: 20.38 KG/M2

## 2024-03-27 DIAGNOSIS — Z09 HOSPITAL DISCHARGE FOLLOW-UP: Primary | ICD-10-CM

## 2024-03-27 DIAGNOSIS — N18.32 STAGE 3B CHRONIC KIDNEY DISEASE (HCC): ICD-10-CM

## 2024-03-27 DIAGNOSIS — C34.12 MALIGNANT NEOPLASM OF UPPER LOBE OF LEFT LUNG (HCC): ICD-10-CM

## 2024-03-27 DIAGNOSIS — J41.0 SIMPLE CHRONIC BRONCHITIS (HCC): ICD-10-CM

## 2024-03-27 DIAGNOSIS — R42 VERTIGO: ICD-10-CM

## 2024-03-27 DIAGNOSIS — I71.43 INFRARENAL ABDOMINAL AORTIC ANEURYSM (AAA) WITHOUT RUPTURE (HCC): ICD-10-CM

## 2024-03-27 DIAGNOSIS — E44.0 MODERATE PROTEIN-CALORIE MALNUTRITION (HCC): ICD-10-CM

## 2024-03-27 DIAGNOSIS — N30.00 ACUTE CYSTITIS WITHOUT HEMATURIA: ICD-10-CM

## 2024-03-27 RX ORDER — CEFDINIR 300 MG/1
300 CAPSULE ORAL DAILY
COMMUNITY
Start: 2024-03-21

## 2024-03-27 NOTE — PROGRESS NOTES
the week.        Patient Active Problem List   Diagnosis    Leukoplakia, gingiva    Osteopenia    Simple chronic bronchitis (HCC)    Lung cancer, upper lobe (HCC)    Squamous cell carcinoma of lung, stage III, right (HCC)    Acquired hypothyroidism    Anxiety    Chronic allergic rhinitis    Essential hypertension    Bilateral carotid artery stenosis    Stage 3b chronic kidney disease (HCC)    Abdominal aortic aneurysm (AAA) 3.0 cm to 5.0 cm in diameter in female (HCC)    Acute right-sided low back pain without sciatica    DDD (degenerative disc disease), lumbar    Protein calorie malnutrition    Disorientation, unspecified    Iron deficiency anemia secondary to blood loss (chronic)    Infrarenal abdominal aortic aneurysm (AAA) without rupture (HCC)    Duodenal ulcer    Celiac disease       Medication list at time of discharge reviewed: Yes    Medications marked \"taking\" at this time  Outpatient Medications Marked as Taking for the 3/27/24 encounter (Office Visit) with Chaparrita Bermudez MD   Medication Sig Dispense Refill    cefdinir (OMNICEF) 300 MG capsule Take 1 capsule by mouth daily      fluticasone (FLONASE) 50 MCG/ACT nasal spray instill 2 spray into each nostril once daily 48 g 3    gabapentin (NEURONTIN) 300 MG capsule take 1 capsule by mouth three times a day 180 capsule 0    escitalopram (LEXAPRO) 10 MG tablet take 1 tablet by mouth once daily 90 tablet 3    levothyroxine (SYNTHROID) 25 MCG tablet take 1 tablet by mouth once daily 90 tablet 3    losartan (COZAAR) 100 MG tablet Take 1 tablet by mouth daily 90 tablet 1    atorvastatin (LIPITOR) 40 MG tablet take 1 tablet by mouth once daily 90 tablet 3    ondansetron (ZOFRAN) 4 MG tablet Take 1 tablet by mouth daily as needed for Nausea or Vomiting 30 tablet 0    vitamin B-12 (CYANOCOBALAMIN) 1000 MCG tablet Take 1 tablet by mouth daily      ferrous sulfate (SLOW FE) 142 (45 Fe) MG extended release tablet Take 142 mg by mouth daily      pembrolizumab

## 2024-04-03 RX ORDER — AMLODIPINE BESYLATE 2.5 MG/1
2.5 TABLET ORAL DAILY
Qty: 90 TABLET | Refills: 0 | Status: SHIPPED | OUTPATIENT
Start: 2024-04-03

## 2024-04-03 RX ORDER — AMLODIPINE BESYLATE 2.5 MG/1
2.5 TABLET ORAL DAILY
Qty: 90 TABLET | Refills: 0 | Status: CANCELLED | OUTPATIENT
Start: 2024-04-03

## 2024-04-03 NOTE — TELEPHONE ENCOUNTER
Ayala from oncology called stating patient's potassium is 5.3 and sodium 134. They do not think it is from keytruda.  She has not had it because she was in the hospital. Patient states she is not drinking any electrolyte containing drinks.  Patient's losartan was recently increased to 100 mg.    Per Dr Bermudez decrease losartan to 50 mg daily. Start amlodipine 2.5 mg daily. Recheck BMP Monday.     Left message for Marga to return call.

## 2024-04-03 NOTE — TELEPHONE ENCOUNTER
She has been drinking her water since the UTI but she does not take any electrolyte drinks. Marga will  the new med and adjust the pills in her pill box tonight after work.     Labs were drawn at oncology this morning. They did not draw a mag- did not give magnesium.        Please approve rx

## 2024-04-09 LAB
ANION GAP SERPL CALCULATED.3IONS-SCNC: 10.3 MMOL/L (ref 3–11)
BUN BLDV-MCNC: 18 MG/DL (ref 7–17)
CALCIUM SERPL-MCNC: 9.1 MG/DL (ref 8.4–10.2)
CHLORIDE BLD-SCNC: 100 MMOL/L (ref 98–120)
CO2: 25 MMOL/L (ref 22–31)
CREAT SERPL-MCNC: 0.9 MG/DL (ref 0.5–1)
GFR CALCULATED: > 60
GLUCOSE: 89 MG/DL (ref 65–105)
POTASSIUM SERPL-SCNC: 4.3 MMOL/L (ref 3.6–5)
SODIUM BLD-SCNC: 131 MMOL/L (ref 135–145)

## 2024-04-10 ENCOUNTER — TELEPHONE (OUTPATIENT)
Dept: FAMILY MEDICINE CLINIC | Age: 79
End: 2024-04-10

## 2024-04-10 DIAGNOSIS — R42 VERTIGO: Primary | ICD-10-CM

## 2024-04-10 NOTE — TELEPHONE ENCOUNTER
Patient is still having lightheaded and dizziness in the morning and it does cause nausea. She is using zofran when the nausea is really bad. The lightheaded/dizziness is happening every day in the morning. It is really starting to bother her.   She said that she did not have these issues before her last hospitalization when she was started on nighttime O2.   Kaylan wants to stop nighttime O2 for a week to see if it helps.  She (and Marga) are convinced that the lightheaded/dizziness is due to her getting too much oxygen. Please advise.     She did have labs completed yesterday to recheck her potassium after decreasing losartan.

## 2024-04-11 DIAGNOSIS — R11.0 NAUSEA: ICD-10-CM

## 2024-04-11 RX ORDER — MECLIZINE HCL 12.5 MG/1
12.5 TABLET ORAL EVERY 6 HOURS PRN
Qty: 30 TABLET | Refills: 0 | Status: SHIPPED | OUTPATIENT
Start: 2024-04-11 | End: 2024-04-21

## 2024-04-11 RX ORDER — ONDANSETRON 4 MG/1
TABLET, FILM COATED ORAL
Qty: 30 TABLET | Refills: 0 | Status: SHIPPED | OUTPATIENT
Start: 2024-04-11

## 2024-04-11 NOTE — TELEPHONE ENCOUNTER
Can try without the O2 at night until MOnday.  Did she do the PT for Vertigo as recommended?  Make suyre getting plenty of fluids and should have a BMP rechecked on Monday as well

## 2024-04-11 NOTE — TELEPHONE ENCOUNTER
Spoke with Marga - She states Perla at [facility] did the evaluation for vertigo She did the evaluation on 4/1 and the maneuver to test for vertigo  was barely positive. Perla said she thinks it is more related to Orthostatic hypotension.     Spoke with nurse - Constant dizziness and nausea (room spinning) all day - so much so it is affecting her ADLs. Nurse states BP was taken at therapy and they said there was a slight drop - she does not have any BP number.  She did verify that therapy did an evaluation on patient and said they do not think that it is vertigo.     Spoke with PT at [facility]- she says that she did the Eply twice on each side. Patient reports lightheaded/dizzy with any positional change. No nystagmus.   116/69 sitting   88/53 standing.     Per Dr Bermudez--  Meclizine 12.5 mg q6h prn. Stop amlodipine. Increase fluids. BMP Monday.  Oxygen off until Monday.  Send vitals to Dr Bermudez tomorrow.     Marga notified by phone. Rx sent to [facility]. Orders faxed to [facility].

## 2024-04-11 NOTE — TELEPHONE ENCOUNTER
Elzbieta Dash is requesting a refill on the following medication(s):  Requested Prescriptions     Pending Prescriptions Disp Refills    ondansetron (ZOFRAN) 4 MG tablet [Pharmacy Med Name: ONDANSETRON HCL 4 MG TABLET] 30 tablet 0     Sig: take 1 tablet by mouth once daily if needed for nausea and vomiting       Last Visit Date (If Applicable):  3/27/2024    Next Visit Date:    4/25/2024

## 2024-04-15 LAB
ANION GAP SERPL CALCULATED.3IONS-SCNC: 9.4 MMOL/L (ref 3–11)
BUN BLDV-MCNC: 17 MG/DL (ref 7–17)
CALCIUM SERPL-MCNC: 9 MG/DL (ref 8.4–10.2)
CHLORIDE BLD-SCNC: 99 MMOL/L (ref 98–120)
CO2: 27 MMOL/L (ref 22–31)
CREAT SERPL-MCNC: 0.9 MG/DL (ref 0.5–1)
GFR CALCULATED: > 60
GLUCOSE: 90 MG/DL (ref 65–105)
POTASSIUM SERPL-SCNC: 4.4 MMOL/L (ref 3.6–5)
SODIUM BLD-SCNC: 131 MMOL/L (ref 135–145)

## 2024-04-28 DIAGNOSIS — I10 PRIMARY HYPERTENSION: ICD-10-CM

## 2024-04-29 RX ORDER — LOSARTAN POTASSIUM 50 MG/1
50 TABLET ORAL DAILY
Qty: 90 TABLET | Refills: 1 | Status: SHIPPED | OUTPATIENT
Start: 2024-04-29

## 2024-04-29 NOTE — TELEPHONE ENCOUNTER
Elzbieta Dash is requesting a refill on the following medication(s):  Requested Prescriptions     Pending Prescriptions Disp Refills    losartan (COZAAR) 50 MG tablet [Pharmacy Med Name: LOSARTAN POTASSIUM 100 MG TAB] 90 tablet 1     Sig: Take 1 tablet by mouth daily       Last Visit Date (If Applicable):  3/27/2024    Next Visit Date:    Visit date not found

## 2024-05-06 RX ORDER — GABAPENTIN 300 MG/1
CAPSULE ORAL
Qty: 180 CAPSULE | Refills: 0 | Status: SHIPPED | OUTPATIENT
Start: 2024-05-06 | End: 2024-06-05

## 2024-05-06 NOTE — TELEPHONE ENCOUNTER
Elzbieta Dash is requesting a refill on the following medication(s):  Requested Prescriptions     Pending Prescriptions Disp Refills    gabapentin (NEURONTIN) 300 MG capsule [Pharmacy Med Name: GABAPENTIN 300 MG CAPSULE] 180 capsule 0     Sig: take 1 capsule by mouth three times a day       Last Visit Date (If Applicable):  3/27/2024    Next Visit Date:    Visit date not found

## 2024-05-08 ENCOUNTER — TELEPHONE (OUTPATIENT)
Dept: FAMILY MEDICINE CLINIC | Age: 79
End: 2024-05-08

## 2024-05-08 DIAGNOSIS — N30.00 ACUTE CYSTITIS WITHOUT HEMATURIA: Primary | ICD-10-CM

## 2024-05-08 RX ORDER — CEPHALEXIN 500 MG/1
500 CAPSULE ORAL 3 TIMES DAILY
Qty: 30 CAPSULE | Refills: 0 | Status: SHIPPED | OUTPATIENT
Start: 2024-05-08 | End: 2024-05-08 | Stop reason: ALTCHOICE

## 2024-05-08 NOTE — TELEPHONE ENCOUNTER
Ordered UA from [place] and called in script to [place] pharmacy-- they will take of both-- message sent to Marga.

## 2024-05-08 NOTE — TELEPHONE ENCOUNTER
Marga thinks she has a UTI again.  Did a AZO urinary tract test strip that came back positive for leukocytes and nitrates.  Is dizzy, lightheaded, equilibrium off.  Can you order an antibiotic or UA or do you need to see?  BP is good.  Can message back through [employer] and Marga will check later.

## 2024-05-10 LAB
BACTERIA, URINE: ABNORMAL /HPF
BILIRUBIN, URINE: NEGATIVE
BLOOD, URINE: ABNORMAL
CASTS UA: PRESENT /LPF
CLARITY: CLEAR
COLOR: YELLOW
CRYSTALS, UA: ABNORMAL
GLUCOSE URINE: NEGATIVE MG/DL
HYALINE CASTS: ABNORMAL /LPF
KETONES, URINE: NEGATIVE MG/DL
LEUKOCYTE ESTERASE, URINE: NEGATIVE
NITRITE, URINE: NEGATIVE
PH, URINE: 6 (ref 5–8.5)
PROTEIN UA: ABNORMAL MG/DL
RBC URINE: ABNORMAL /HPF (ref 0–2)
SPECIFIC GRAVITY UA: 1.01 MG/DL (ref 1–1.03)
SQUAMOUS EPITHELIAL: ABNORMAL /HPF
UROBILINOGEN, URINE: 0.2 MG/DL (ref 0.2–1)
WBC URINE: ABNORMAL /HPF (ref 0–4)

## 2024-05-17 LAB
ANION GAP SERPL CALCULATED.3IONS-SCNC: 8.3 MMOL/L (ref 3–11)
BUN BLDV-MCNC: 10 MG/DL (ref 7–17)
CALCIUM SERPL-MCNC: 8.5 MG/DL (ref 8.4–10.2)
CHLORIDE BLD-SCNC: 102 MMOL/L (ref 98–120)
CO2: 23 MMOL/L (ref 22–31)
CREAT SERPL-MCNC: 0.7 MG/DL (ref 0.5–1)
CREATININE CLEARANCE: 37.81
GFR, ESTIMATED: > 60
GLUCOSE: 96 MG/DL (ref 65–105)
MAGNESIUM: 1.5 MG/DL (ref 1.6–2.3)
POTASSIUM SERPL-SCNC: 4.3 MMOL/L (ref 3.6–5)
SODIUM BLD-SCNC: 129 MMOL/L (ref 135–145)

## 2024-05-18 LAB
ANION GAP SERPL CALCULATED.3IONS-SCNC: 7.1 MMOL/L (ref 3–11)
BUN BLDV-MCNC: 9 MG/DL (ref 7–17)
CALCIUM SERPL-MCNC: 8.8 MG/DL (ref 8.4–10.2)
CHLORIDE BLD-SCNC: 106 MMOL/L (ref 98–120)
CO2: 25 MMOL/L (ref 22–31)
CREAT SERPL-MCNC: 0.7 MG/DL (ref 0.5–1)
CREATININE CLEARANCE: 38.97
GFR, ESTIMATED: > 60
GLUCOSE: 95 MG/DL (ref 65–105)
MAGNESIUM: 2.2 MG/DL (ref 1.6–2.3)
POTASSIUM SERPL-SCNC: 4.1 MMOL/L (ref 3.6–5)
SODIUM BLD-SCNC: 134 MMOL/L (ref 135–145)

## 2024-05-20 ENCOUNTER — TELEPHONE (OUTPATIENT)
Dept: FAMILY MEDICINE CLINIC | Age: 79
End: 2024-05-20

## 2024-05-20 NOTE — TELEPHONE ENCOUNTER
Care Transitions Initial Follow Up Call    Outreach made within 2 business days of discharge: Yes    Patient: Elzbieta Dash Patient : 1945   MRN: [id number]  Reason for Admission: hyponatremia  Discharge Date:  24      Spoke with: Marga    Discharge department/facility: [facility]    TCM Interactive Patient Contact:  Was patient able to fill all prescriptions: Yes  Was patient instructed to bring all medications to the follow-up visit: Yes  Is patient taking all medications as directed in the discharge summary? Yes  Does patient understand their discharge instructions: Yes  Does patient have questions or concerns that need addressed prior to 7-14 day follow up office visit: Yes- patient is doing really well since d/c from hospital. Marga states that melizine was causing her to be really sleepy so she started cutting the pill in half.  Patient is not as sleepy and it helps the dizziness. Only had to take it once since d/c from hospital.  Marga wants to know what they can do to help prevent the UTI. She is doing cranberry and increasing water intake. She is limiting her water to 1500 ml (3-20oz bottles of water)  Should she continue this amount of water?     Scheduled appointment with PCP within 7-14 days    Follow Up  Future Appointments   Date Time Provider Department Center   2024 11:40 AM [facility]       Lynn Orta LPN

## 2024-05-29 ENCOUNTER — OFFICE VISIT (OUTPATIENT)
Dept: FAMILY MEDICINE CLINIC | Age: 79
End: 2024-05-29

## 2024-05-29 VITALS
OXYGEN SATURATION: 96 % | WEIGHT: 112 LBS | BODY MASS INDEX: 19.84 KG/M2 | DIASTOLIC BLOOD PRESSURE: 74 MMHG | SYSTOLIC BLOOD PRESSURE: 132 MMHG | HEART RATE: 81 BPM

## 2024-05-29 DIAGNOSIS — C34.12 MALIGNANT NEOPLASM OF UPPER LOBE OF LEFT LUNG (HCC): ICD-10-CM

## 2024-05-29 DIAGNOSIS — N18.32 STAGE 3B CHRONIC KIDNEY DISEASE (HCC): ICD-10-CM

## 2024-05-29 DIAGNOSIS — I71.40 ABDOMINAL AORTIC ANEURYSM (AAA) 3.0 CM TO 5.0 CM IN DIAMETER IN FEMALE (HCC): ICD-10-CM

## 2024-05-29 DIAGNOSIS — E87.1 HYPONATREMIA: Primary | ICD-10-CM

## 2024-05-29 DIAGNOSIS — Z09 HOSPITAL DISCHARGE FOLLOW-UP: ICD-10-CM

## 2024-05-29 LAB
ANION GAP SERPL CALCULATED.3IONS-SCNC: 12.1 MMOL/L (ref 3–11)
BUN BLDV-MCNC: 13 MG/DL (ref 7–17)
CALCIUM SERPL-MCNC: 9.3 MG/DL (ref 8.4–10.2)
CHLORIDE BLD-SCNC: 95 MMOL/L (ref 98–120)
CO2: 25 MMOL/L (ref 22–31)
CREAT SERPL-MCNC: 0.8 MG/DL (ref 0.5–1)
GFR, ESTIMATED: > 60
GLUCOSE: 96 MG/DL (ref 65–105)
POTASSIUM SERPL-SCNC: 5.1 MMOL/L (ref 3.6–5)
SODIUM BLD-SCNC: 127 MMOL/L (ref 135–145)

## 2024-05-29 SDOH — ECONOMIC STABILITY: FOOD INSECURITY: WITHIN THE PAST 12 MONTHS, THE FOOD YOU BOUGHT JUST DIDN'T LAST AND YOU DIDN'T HAVE MONEY TO GET MORE.: NEVER TRUE

## 2024-05-29 SDOH — ECONOMIC STABILITY: FOOD INSECURITY: WITHIN THE PAST 12 MONTHS, YOU WORRIED THAT YOUR FOOD WOULD RUN OUT BEFORE YOU GOT MONEY TO BUY MORE.: NEVER TRUE

## 2024-05-29 SDOH — ECONOMIC STABILITY: INCOME INSECURITY: HOW HARD IS IT FOR YOU TO PAY FOR THE VERY BASICS LIKE FOOD, HOUSING, MEDICAL CARE, AND HEATING?: NOT HARD AT ALL

## 2024-05-29 NOTE — PATIENT INSTRUCTIONS
Trial of decreasing the gabapentin to 2 times daily and see if this helps with the fogginess and dizziness.     Recheck the labs and the urine sodium today.  If the sodium is still running lower then would consider adding in the salt tablet.      Use the meclizine 1/2 tablet as needed.

## 2024-05-29 NOTE — PROGRESS NOTES
Post-Discharge Transitional Care Follow Up    Elzbieta Dash   YOB: 1945    Date of Office Visit:  5/29/2024  Date of Hospital Admission: 5/16/24  Date of Hospital Discharge: 5/18/24    Care management risk score Rising risk (score 2-5) and Complex Care (Scores >=6): No Risk Score On File     Non face to face  following discharge, date last encounter closed (first attempt may have been earlier): 05/20/2024     Call initiated 2 business days of discharge: Yes    ASSESSMENT/PLAN:   Hyponatremia  Hospital discharge follow-up  -     [place] DISCHARGE MEDS RECONCILED W/ CURRENT OUTPATIENT MED LIST  Stage 3b chronic kidney disease (HCC)  Abdominal aortic aneurysm (AAA) 3.0 cm to 5.0 cm in diameter in female (HCC)  Malignant neoplasm of upper lobe of left lung (HCC)      Trial of decreasing the gabapentin to 2 times daily and see if this helps with the fogginess and dizziness.     Recheck the labs and the urine sodium today.  If the sodium is still running lower then would consider adding in the salt tablet.  Still small possibility that the gabapentin is contributing to the lower sodium. With the evaluation thus far appears related to SIADH.    Aortic aneurysm is slightly larger.  Will monitor closely and after PET scan consider referral to vascular    Use the meclizine 1/2 tablet as needed.      Medical Decision Making: moderate complexity  Return in about 6 weeks (around 7/10/2024) for Medication recheck.           Subjective:   HPI  Chief Complaint   Patient presents with    ED Follow-up     [facility] d/c 5/18    Dizziness     Pt states that she gets dizzy spells. Pt states that the dizziness comes randomly. Marga states that pt complains about dizziness a lot in the morning.     Leg Pain     Pt has been complaining of calf pain in both legs. Pt states that the pain comes and goes. The pain comes for awhile and then goes away for some time.      Was hospitalized for confusion and severe low sodium.     Inpatient

## 2024-05-30 ENCOUNTER — TELEPHONE (OUTPATIENT)
Dept: FAMILY MEDICINE CLINIC | Age: 79
End: 2024-05-30

## 2024-05-30 DIAGNOSIS — E87.1 HYPONATREMIA: Primary | ICD-10-CM

## 2024-05-30 LAB
OSMOLARITY, URINE: 235 MOSM/KG (ref 80–1300)
SODIUM, RANDOM URINE: 25 MMOL/L

## 2024-05-30 RX ORDER — SODIUM CHLORIDE 1 G/1
1 TABLET ORAL DAILY
Qty: 30 TABLET | Refills: 3 | Status: SHIPPED | OUTPATIENT
Start: 2024-05-30

## 2024-05-30 NOTE — TELEPHONE ENCOUNTER
Notify Elzbieta that her labs show she is continuing to drop her sodium and her potassium has actually risen slightly.  I woul dottie merida to cut her losartan in 1/2 (drop down to 25 mg daily) and add in a salt tablet daily-- prescription sent in to the pharmacy.  Recheck this in 1 week.  Continue with the water limit as per the hospital.

## 2024-06-06 LAB
ANION GAP SERPL CALCULATED.3IONS-SCNC: 8.5 MMOL/L (ref 3–11)
BUN BLDV-MCNC: 19 MG/DL (ref 7–17)
CALCIUM SERPL-MCNC: 8.7 MG/DL (ref 8.4–10.2)
CHLORIDE BLD-SCNC: 100 MMOL/L (ref 98–120)
CO2: 27 MMOL/L (ref 22–31)
CREAT SERPL-MCNC: 0.8 MG/DL (ref 0.5–1)
GFR, ESTIMATED: > 60
GLUCOSE: 86 MG/DL (ref 65–105)
POTASSIUM SERPL-SCNC: 4.5 MMOL/L (ref 3.6–5)
SODIUM BLD-SCNC: 131 MMOL/L (ref 135–145)

## 2024-06-20 ENCOUNTER — PATIENT MESSAGE (OUTPATIENT)
Dept: FAMILY MEDICINE CLINIC | Age: 79
End: 2024-06-20

## 2024-06-20 DIAGNOSIS — E87.1 HYPONATREMIA: Primary | ICD-10-CM

## 2024-06-21 NOTE — TELEPHONE ENCOUNTER
From: Elzbieta Dash  To: Dr. Chaparrita Bermudez  Sent: 6/20/2024 5:40 PM EDT  Subject: Sodium    Hi Dr Bermudez- This is Marga. Naveen texted me today to inform that she was experiencing dizziness again this morning so I advised her to take a half pill of the Meclazine. Since her last sodium level test was still only 131, do you think we should have [place] draw again? 'Twas May 30th that we eliminated Lunch Gabapentin & cut Losartin in half to 25mg & added the sodium pill to see if it would help her levels without disturbing pain levels. She hasn't complained of pain until earlier this week when she told Barry her knees were bothering her (she's never had an issue with knees).  Just trying to keep on top & ahead of things :-)   Thanks, Marga

## 2024-06-24 DIAGNOSIS — R41.0 CONFUSION: Primary | ICD-10-CM

## 2024-06-24 RX ORDER — AMLODIPINE BESYLATE 2.5 MG/1
2.5 TABLET ORAL DAILY
Qty: 90 TABLET | Refills: 0 | OUTPATIENT
Start: 2024-06-24

## 2024-06-24 NOTE — TELEPHONE ENCOUNTER
Nurse notified of lab orders.     Son also asked for an order for a UA w/ culture. He has noticed increased confusion and reported that patient had a fall this morning around 8:30 am.  She was backing up and lost her balance. No injuries.  Vitals this morning 95/67, pulse 103, 98.2, 97%.   Gave verbal ok for UA.

## 2024-06-26 LAB
ANION GAP SERPL CALCULATED.3IONS-SCNC: 12.4 MMOL/L (ref 3–11)
BUN BLDV-MCNC: 16 MG/DL (ref 7–17)
CALCIUM SERPL-MCNC: 8.2 MG/DL (ref 8.4–10.2)
CHLORIDE BLD-SCNC: 106 MMOL/L (ref 98–120)
CO2: 20 MMOL/L (ref 22–31)
CREAT SERPL-MCNC: 0.8 MG/DL (ref 0.5–1)
CREATININE CLEARANCE: 38.97
GFR, ESTIMATED: > 60
GLUCOSE: 119 MG/DL (ref 65–105)
POTASSIUM SERPL-SCNC: 4.4 MMOL/L (ref 3.6–5)
SODIUM BLD-SCNC: 134 MMOL/L (ref 135–145)

## 2024-06-27 LAB
ANION GAP SERPL CALCULATED.3IONS-SCNC: 6.8 MMOL/L (ref 3–11)
BASOPHILS ABSOLUTE: 0.09 X10E3/?L (ref 0–0.3)
BASOPHILS RELATIVE PERCENT: 1.02 % (ref 0–3)
BUN BLDV-MCNC: 11 MG/DL (ref 7–17)
CALCIUM SERPL-MCNC: 8.4 MG/DL (ref 8.4–10.2)
CHLORIDE BLD-SCNC: 106 MMOL/L (ref 98–120)
CO2: 23 MMOL/L (ref 22–31)
CREAT SERPL-MCNC: 0.8 MG/DL (ref 0.5–1)
CREATININE CLEARANCE: 38.97
EOSINOPHILS ABSOLUTE: 1.11 X10E3/?L (ref 0–1.1)
EOSINOPHILS RELATIVE PERCENT: 12.69 % (ref 0–10)
GFR, ESTIMATED: > 60
GLUCOSE: 104 MG/DL (ref 65–105)
HCT VFR BLD CALC: 27.5 % (ref 37–47)
HEMOGLOBIN: 9 G/DL (ref 12–16)
LYMPHOCYTES ABSOLUTE: 0.99 X10E3/?L (ref 1–5.5)
LYMPHOCYTES RELATIVE PERCENT: 11.37 % (ref 20–51.1)
MCH RBC QN AUTO: 32.4 PG (ref 28.5–32.5)
MCHC RBC AUTO-ENTMCNC: 32.7 G/DL (ref 32–37)
MCV RBC AUTO: 99.2 FL (ref 80–94)
MONOCYTES ABSOLUTE: 1.15 X10E3/?L (ref 0.1–1)
MONOCYTES RELATIVE PERCENT: 13.17 % (ref 1.7–9.3)
NEUTROPHILS ABSOLUTE: 5.4 X10E3/?L (ref 2–8.1)
NEUTROPHILS RELATIVE PERCENT: 61.76 % (ref 42.2–75.2)
PDW BLD-RTO: 12.6 % (ref 8.5–15.5)
PLATELET # BLD: 327.2 THOU/MM3 (ref 130–400)
POTASSIUM SERPL-SCNC: 4.4 MMOL/L (ref 3.6–5)
RBC # BLD: 2.77 M/UL (ref 4.2–5.4)
SODIUM BLD-SCNC: 136 MMOL/L (ref 135–145)
WBC # BLD: 8.7 THOU/ML3 (ref 4.8–10.8)

## 2024-06-28 LAB
ANION GAP SERPL CALCULATED.3IONS-SCNC: 9 MMOL/L (ref 3–11)
BUN BLDV-MCNC: 16 MG/DL (ref 7–17)
CALCIUM SERPL-MCNC: 8.8 MG/DL (ref 8.4–10.2)
CHLORIDE BLD-SCNC: 98 MMOL/L (ref 98–120)
CO2: 31 MMOL/L (ref 22–31)
CREAT SERPL-MCNC: 0.9 MG/DL (ref 0.5–1)
CREATININE CLEARANCE: 38.23
GFR, ESTIMATED: > 60
GLUCOSE: 102 MG/DL (ref 65–105)
IRON % SATURATION: 35 % (ref 15–38)
IRON: 63 MG/DL (ref 37–170)
POTASSIUM SERPL-SCNC: 4 MMOL/L (ref 3.6–5)
SODIUM BLD-SCNC: 134 MMOL/L (ref 135–145)
TOTAL IRON BINDING CAPACITY: 181 MG/DL (ref 261–497)

## 2024-07-01 ENCOUNTER — TELEPHONE (OUTPATIENT)
Dept: FAMILY MEDICINE CLINIC | Age: 79
End: 2024-07-01

## 2024-07-01 NOTE — TELEPHONE ENCOUNTER
Care Transitions Initial Follow Up Call    Outreach made within 2 business days of discharge: Yes    Patient: Elzbieta Dash Patient : 1945   MRN: [id number]  Reason for Admission:   Discharge Date:  altered mental status/UTI      Spoke with: Marga    Discharge department/facility: [facility] Interactive Patient Contact:  Was patient able to fill all prescriptions: Yes  Was patient instructed to bring all medications to the follow-up visit: Yes  Is patient taking all medications as directed in the discharge summary? Yes  Does patient understand their discharge instructions: Yes  Does patient have questions or concerns that need addressed prior to 7-14 day follow up office visit: no - She did have some swelling in her ankles and feet. Marga noticed it on Saturday but patient had not put her feet up all day.  Visual acuity is still not back to normal.  Aides are in the room with her when she is showering just in case she feels unsteady.  She still has some dizziness but she is taking dramamine prn and it helps.  Marga denies any concerns/questions at this time.     Scheduled appointment with PCP within 7-14 days    Follow Up  Future Appointments   Date Time Provider Department Center   2024  9:40 AM Chaparrita Bermudez MD [facility]       Lynn Orta LPN

## 2024-07-08 ENCOUNTER — OFFICE VISIT (OUTPATIENT)
Dept: FAMILY MEDICINE CLINIC | Age: 79
End: 2024-07-08

## 2024-07-08 VITALS
OXYGEN SATURATION: 97 % | WEIGHT: 107 LBS | SYSTOLIC BLOOD PRESSURE: 130 MMHG | HEART RATE: 94 BPM | DIASTOLIC BLOOD PRESSURE: 68 MMHG | BODY MASS INDEX: 18.95 KG/M2

## 2024-07-08 DIAGNOSIS — Z09 HOSPITAL DISCHARGE FOLLOW-UP: Primary | ICD-10-CM

## 2024-07-08 DIAGNOSIS — C34.12 MALIGNANT NEOPLASM OF UPPER LOBE OF LEFT LUNG (HCC): ICD-10-CM

## 2024-07-08 DIAGNOSIS — R27.8 DECREASED COORDINATION: ICD-10-CM

## 2024-07-08 DIAGNOSIS — R42 DIZZINESS: ICD-10-CM

## 2024-07-08 DIAGNOSIS — R09.02 HYPOXIA: ICD-10-CM

## 2024-07-08 DIAGNOSIS — N18.32 STAGE 3B CHRONIC KIDNEY DISEASE (HCC): ICD-10-CM

## 2024-07-08 DIAGNOSIS — E22.2 SIADH (SYNDROME OF INAPPROPRIATE ADH PRODUCTION) (HCC): ICD-10-CM

## 2024-07-08 DIAGNOSIS — I10 ESSENTIAL HYPERTENSION: ICD-10-CM

## 2024-07-08 DIAGNOSIS — E03.9 ACQUIRED HYPOTHYROIDISM: ICD-10-CM

## 2024-07-08 DIAGNOSIS — I71.40 ABDOMINAL AORTIC ANEURYSM (AAA) 3.0 CM TO 5.0 CM IN DIAMETER IN FEMALE (HCC): ICD-10-CM

## 2024-07-08 DIAGNOSIS — N30.00 ACUTE CYSTITIS WITHOUT HEMATURIA: ICD-10-CM

## 2024-07-08 LAB
ANION GAP SERPL CALCULATED.3IONS-SCNC: 9.7 MMOL/L (ref 3–11)
BACTERIA, URINE: ABNORMAL /HPF
BILIRUBIN, URINE: NEGATIVE
BLOOD, URINE: ABNORMAL
BUN BLDV-MCNC: 17 MG/DL (ref 7–17)
CALCIUM SERPL-MCNC: 9.2 MG/DL (ref 8.4–10.2)
CASTS UA: ABNORMAL /LPF
CHLORIDE BLD-SCNC: 96 MMOL/L (ref 98–120)
CLARITY, UA: CLEAR
CO2: 27 MMOL/L (ref 22–31)
COLOR, UA: YELLOW
CREAT SERPL-MCNC: 1 MG/DL (ref 0.5–1)
CRYSTALS, UA: ABNORMAL
GFR, ESTIMATED: 57.1
GLUCOSE URINE: NEGATIVE MG/DL
GLUCOSE: 104 MG/DL (ref 65–105)
KETONES, URINE: NEGATIVE MG/DL
LEUKOCYTE ESTERASE, URINE: NEGATIVE
MUCUS, URINE: ABNORMAL
NITRITE, URINE: NEGATIVE
PH, URINE: 6 (ref 5–8.5)
POTASSIUM SERPL-SCNC: 4.7 MMOL/L (ref 3.6–5)
PROTEIN UA: ABNORMAL MG/DL
RBC URINE: ABNORMAL /HPF (ref 0–2)
SODIUM BLD-SCNC: 128 MMOL/L (ref 135–145)
SPECIFIC GRAVITY UA: 1.01 MG/DL (ref 1–1.03)
SQUAMOUS EPITHELIAL: ABNORMAL /HPF
UROBILINOGEN, URINE: 0.2 MG/DL (ref 0.2–1)
VITAMIN B-12: > 1000 PG/ML (ref 239–931)
WBC URINE: ABNORMAL /HPF (ref 0–4)

## 2024-07-08 RX ORDER — CEFDINIR 300 MG/1
300 CAPSULE ORAL 2 TIMES DAILY
Qty: 20 CAPSULE | Refills: 0 | Status: SHIPPED | OUTPATIENT
Start: 2024-07-08 | End: 2024-07-18

## 2024-07-08 NOTE — RESULT ENCOUNTER NOTE
Please notify Marga that her sodium is down to 128 again.  I would like her to DOUBLE her salt tab-- take this twice daily.  Recheck this on Friday am.  Can be done at [place].

## 2024-07-08 NOTE — PROGRESS NOTES
Post-Discharge Transitional Care Follow Up    Elzbieta Dash   YOB: 1945    Date of Office Visit:  7/8/2024  Date of Hospital Admission: 6/24  Date of Hospital Discharge: 6/28    Care management risk score Rising risk (score 2-5) and Complex Care (Scores >=6): No Risk Score On File     Non face to face  following discharge, date last encounter closed (first attempt may have been earlier): 07/01/2024     Call initiated 2 business days of discharge: Yes    ASSESSMENT/PLAN:   Hospital discharge follow-up  -     [place] DISCHARGE MEDS RECONCILED W/ CURRENT OUTPATIENT MED LIST  Essential hypertension  Stage 3b chronic kidney disease (HCC)  Acquired hypothyroidism  Acute cystitis without hematuria  -     cefdinir (OMNICEF) 300 MG capsule; Take 1 capsule by mouth 2 times daily for 10 days, Disp-20 capsule, R-0Normal  -     Urinalysis with Reflex to Culture; Future  Dizziness  SIADH (syndrome of inappropriate ADH production) (HCC)  Abdominal aortic aneurysm (AAA) 3.0 cm to 5.0 cm in diameter in female (HCC)  -     [facility] - Delos Reyes, Arthur, MD, Vascular Surgery, [place]  Decreased coordination  -     External Referral To Occupational Therapy  Malignant neoplasm of upper lobe of left lung (HCC)  -     DME Order for Home Oxygen as OP  Hypoxia  -     DME Order for Home Oxygen as OP    Check the labs today-- rechecking the sodium and the B12.  Restart the overnight oxygen at 2 liters. Consider increasing the sodium to 2 GM    With the recent and recurrent UTI will send a prescription to the pharmacy.     Recheck UA today    Refer to Vascular again for the AAA    Medical Decision Making: high complexity  Return in about 3 months (around 10/8/2024).           Subjective:   HPI    Admitted to [facility] with UTI, hyponatremia-- treated with antibiotics and the hydration-- has lost at least 5 lbs.     Inpatient course: Discharge summary reviewed- see chart.    Interval history/Current status: thinks she was lightheaded.  Fell

## 2024-07-09 ENCOUNTER — TELEPHONE (OUTPATIENT)
Dept: FAMILY MEDICINE CLINIC | Age: 79
End: 2024-07-09

## 2024-07-09 DIAGNOSIS — R53.1 WEAKNESS: Primary | ICD-10-CM

## 2024-07-09 DIAGNOSIS — D50.0 BLOOD LOSS ANEMIA: ICD-10-CM

## 2024-07-09 DIAGNOSIS — R09.02 HYPOXIA: Primary | ICD-10-CM

## 2024-07-09 NOTE — RESULT ENCOUNTER NOTE
Await culture results.  If this is positive then will treat.  If negative then would consider urolgy referral for the persistent hematuria.

## 2024-07-09 NOTE — TELEPHONE ENCOUNTER
Tried using oxygen last night.  Woke up several times during night with dizziness.  This morning is nauseous.  Convinced she cannot use the oxygen.  Was doing well last evening per Marga, daughter-in-law.   yes

## 2024-07-10 NOTE — TELEPHONE ENCOUNTER
Spoke with Marga- they are leaving for vacation Saturday - do you want the overnight sleep study prior to leaving?  Marga tried to explain to wendi that most likely she is feeling poorly because her sodium dropped again.   She is going to call and speak with Wendi and see how she is feeling today after being on the increased sodium dose and will call back.

## 2024-07-10 NOTE — TELEPHONE ENCOUNTER
She slept good and did not have any problems with her O2. She did not have to take any dizzy pills last night or this morning. She is a little nauseated but she states today is nothing like yesterday when she was feeling so terrible.   Marga wants to know if we really need to go ahead with the overnight O2 test.

## 2024-07-10 NOTE — TELEPHONE ENCOUNTER
Let's try to set up a over night pulse ox.on room air.  In the meantime.  Try using only 0.5 L at bedtime and see if she feels better with this.

## 2024-07-11 ENCOUNTER — TELEPHONE (OUTPATIENT)
Dept: VASCULAR SURGERY | Age: 79
End: 2024-07-11

## 2024-07-11 NOTE — TELEPHONE ENCOUNTER
Writer attempted  to reach patient x 2 in regards to a referral to Vascular Surgery from   Dr Bermudez.    Writer left two messages to call in regards to this referral and a letter was mailed to the patient today.    The referral came to [facility]. On the bottom of referral it was documented in scheduling instructions- \"[facility] \"    Dr. Delos Reyes and Dr Pineda see patients in the Vascular Clinic here at [facility].    Dr. Delos Reyes recently started seeing patients at the [facility].  [facility] does not schedule for this clinic.    This referral was faxed to [facility] Vascular Surgery office for their staff to contact patient.  Phone [phone]  Fax [phone]

## 2024-07-12 LAB
ANION GAP SERPL CALCULATED.3IONS-SCNC: 12.3 MMOL/L (ref 3–11)
BUN BLDV-MCNC: 12 MG/DL (ref 7–17)
CALCIUM SERPL-MCNC: 9.1 MG/DL (ref 8.4–10.2)
CHLORIDE BLD-SCNC: 97 MMOL/L (ref 98–120)
CO2: 25 MMOL/L (ref 22–31)
CREAT SERPL-MCNC: 0.9 MG/DL (ref 0.5–1)
GFR, ESTIMATED: > 60
GLUCOSE: 143 MG/DL (ref 65–105)
POTASSIUM SERPL-SCNC: 4.3 MMOL/L (ref 3.6–5)
SODIUM BLD-SCNC: 130 MMOL/L (ref 135–145)

## 2024-07-22 RX ORDER — ATORVASTATIN CALCIUM 40 MG/1
40 TABLET, FILM COATED ORAL DAILY
Qty: 90 TABLET | Refills: 3 | Status: SHIPPED | OUTPATIENT
Start: 2024-07-22

## 2024-07-22 NOTE — TELEPHONE ENCOUNTER
Elzbieta Dash is requesting a refill on the following medication(s):  Requested Prescriptions     Pending Prescriptions Disp Refills    atorvastatin (LIPITOR) 40 MG tablet 90 tablet 3     Sig: Take 1 tablet by mouth daily       Last Visit Date (If Applicable):  7/8/2024    Next Visit Date:    9/12/2024

## 2024-07-28 ENCOUNTER — PATIENT MESSAGE (OUTPATIENT)
Dept: FAMILY MEDICINE CLINIC | Age: 79
End: 2024-07-28

## 2024-07-28 DIAGNOSIS — E87.1 HYPONATREMIA: ICD-10-CM

## 2024-07-29 RX ORDER — SODIUM CHLORIDE 1 G/1
2 TABLET ORAL DAILY
Qty: 180 TABLET | Refills: 3 | Status: SHIPPED | OUTPATIENT
Start: 2024-07-29

## 2024-07-29 NOTE — TELEPHONE ENCOUNTER
Elzbieta Dash is requesting a refill on the following medication(s):  Requested Prescriptions     Pending Prescriptions Disp Refills    sodium chloride 1 g tablet 180 tablet 3     Sig: Take 2 tablets by mouth daily       Last Visit Date (If Applicable):  7/8/2024    Next Visit Date:    9/12/2024

## 2024-07-29 NOTE — TELEPHONE ENCOUNTER
From: Elzbieta Dash  To: Dr. Chaparrita Bermudez  Sent: 7/28/2024 10:04 AM EDT  Subject: Sodium Chloride Script change    Need to have New Script for this as the existing one is only for 1 tablet per day & we changed the frequency to 2 tablets per day. Please send new script to [facility] for 90 day supply. Thanks Marga

## 2024-07-30 ENCOUNTER — TELEPHONE (OUTPATIENT)
Dept: FAMILY MEDICINE CLINIC | Age: 79
End: 2024-07-30

## 2024-07-30 DIAGNOSIS — R41.0 CONFUSION: Primary | ICD-10-CM

## 2024-07-31 LAB
ALBUMIN/GLOBULIN RATIO: 1.5 G/DL
ALBUMIN: 4 G/DL (ref 3.5–5)
ALP BLD-CCNC: 92 UNITS/L (ref 38–126)
ALT SERPL-CCNC: 17 UNITS/L (ref 4–35)
ANION GAP SERPL CALCULATED.3IONS-SCNC: 9.8 MMOL/L (ref 3–11)
AST SERPL-CCNC: 31 UNITS/L (ref 14–36)
BASOPHILS ABSOLUTE: 0.13 X10E3/?L (ref 0–0.3)
BASOPHILS RELATIVE PERCENT: 1.61 % (ref 0–3)
BILIRUB SERPL-MCNC: 0.3 MG/DL (ref 0.2–1.3)
BUN BLDV-MCNC: 12 MG/DL (ref 7–17)
CALCIUM SERPL-MCNC: 9.2 MG/DL (ref 8.4–10.2)
CHLORIDE BLD-SCNC: 101 MMOL/L (ref 98–120)
CO2: 25 MMOL/L (ref 22–31)
CREAT SERPL-MCNC: 0.8 MG/DL (ref 0.5–1)
EOSINOPHILS ABSOLUTE: 0.92 X10E3/?L (ref 0–1.1)
EOSINOPHILS RELATIVE PERCENT: 11.72 % (ref 0–10)
GFR, ESTIMATED: > 60
GLOBULIN: 2.6 G/DL
GLUCOSE: 85 MG/DL (ref 65–105)
HCT VFR BLD CALC: 36.5 % (ref 37–47)
HEMOGLOBIN: 11.2 G/DL (ref 12–16)
LYMPHOCYTES ABSOLUTE: 1.14 X10E3/?L (ref 1–5.5)
LYMPHOCYTES RELATIVE PERCENT: 14.49 % (ref 20–51.1)
MCH RBC QN AUTO: 31.5 PG (ref 28.5–32.5)
MCHC RBC AUTO-ENTMCNC: 30.7 G/DL (ref 32–37)
MCV RBC AUTO: 102.6 FL (ref 80–94)
MONOCYTES ABSOLUTE: 0.81 X10E3/?L (ref 0.1–1)
MONOCYTES RELATIVE PERCENT: 10.32 % (ref 1.7–9.3)
NEUTROPHILS ABSOLUTE: 4.85 X10E3/?L (ref 2–8.1)
NEUTROPHILS RELATIVE PERCENT: 61.86 % (ref 42.2–75.2)
PDW BLD-RTO: 13.4 % (ref 8.5–15.5)
PLATELET # BLD: 318.6 THOU/MM3 (ref 130–400)
POTASSIUM SERPL-SCNC: 4.6 MMOL/L (ref 3.6–5)
RBC # BLD: 3.56 M/UL (ref 4.2–5.4)
SODIUM BLD-SCNC: 135 MMOL/L (ref 135–145)
TOTAL PROTEIN: 6.6 G/DL (ref 6.3–8.2)
WBC # BLD: 7.8 THOU/ML3 (ref 4.8–10.8)

## 2024-08-04 DIAGNOSIS — M54.50 ACUTE RIGHT-SIDED LOW BACK PAIN WITHOUT SCIATICA: Primary | ICD-10-CM

## 2024-08-05 RX ORDER — GABAPENTIN 300 MG/1
300 CAPSULE ORAL 2 TIMES DAILY
Qty: 180 CAPSULE | Refills: 0 | Status: SHIPPED | OUTPATIENT
Start: 2024-08-05 | End: 2024-11-03

## 2024-08-05 NOTE — TELEPHONE ENCOUNTER
Elzbieta Dash is requesting a refill on the following medication(s):  Requested Prescriptions     Pending Prescriptions Disp Refills    gabapentin (NEURONTIN) 300 MG capsule 270 capsule 0     Sig: Take 1 capsule by mouth 3 times daily for 90 days.       Last Visit Date (If Applicable):  7/8/2024    Next Visit Date:    9/12/2024

## 2024-08-13 ENCOUNTER — TELEPHONE (OUTPATIENT)
Dept: FAMILY MEDICINE CLINIC | Age: 79
End: 2024-08-13

## 2024-08-13 DIAGNOSIS — R09.02 HYPOXIA: ICD-10-CM

## 2024-08-13 NOTE — TELEPHONE ENCOUNTER
Marga called and states that Rosa called saying Elzbieta went to sit in a chair and took a tumble. Patient was getting ready to go to the fair and went to the fair in a wheel chair. When Elzbieta got back she was having more hip pain and started to have diarrhea and vomiting. Rosa informed Marga that they did notice bright red blood in her brief. Marga states she is getting ready to take Elzbieta to the ER and wanted  to be aware of what is going on.

## 2024-08-15 LAB
ANION GAP SERPL CALCULATED.3IONS-SCNC: 13.1 MMOL/L (ref 3–11)
BASOPHILS ABSOLUTE: 0.11 X10E3/?L (ref 0–0.3)
BASOPHILS RELATIVE PERCENT: 1.41 % (ref 0–3)
BUN BLDV-MCNC: 12 MG/DL (ref 7–17)
CALCIUM SERPL-MCNC: 7.5 MG/DL (ref 8.4–10.2)
CHLORIDE BLD-SCNC: 104 MMOL/L (ref 98–120)
CO2: 20 MMOL/L (ref 22–31)
CREAT SERPL-MCNC: 0.8 MG/DL (ref 0.5–1)
CREATININE CLEARANCE: 38.97
EOSINOPHILS ABSOLUTE: 0.82 X10E3/?L (ref 0–1.1)
EOSINOPHILS RELATIVE PERCENT: 10.13 % (ref 0–10)
GFR, ESTIMATED: > 60
GLUCOSE: 107 MG/DL (ref 65–105)
HCT VFR BLD CALC: 28.6 % (ref 37–47)
HEMOGLOBIN: 8.9 G/DL (ref 12–16)
LYMPHOCYTES ABSOLUTE: 0.68 X10E3/?L (ref 1–5.5)
LYMPHOCYTES RELATIVE PERCENT: 8.42 % (ref 20–51.1)
MCH RBC QN AUTO: 32.1 PG (ref 28.5–32.5)
MCHC RBC AUTO-ENTMCNC: 31.2 G/DL (ref 32–37)
MCV RBC AUTO: 102.7 FL (ref 80–94)
MONOCYTES ABSOLUTE: 1.01 X10E3/?L (ref 0.1–1)
MONOCYTES RELATIVE PERCENT: 12.51 % (ref 1.7–9.3)
NEUTROPHILS ABSOLUTE: 5.47 X10E3/?L (ref 2–8.1)
NEUTROPHILS RELATIVE PERCENT: 67.53 % (ref 42.2–75.2)
PDW BLD-RTO: 13.1 % (ref 8.5–15.5)
PLATELET # BLD: 172.9 THOU/MM3 (ref 130–400)
POTASSIUM SERPL-SCNC: 4.1 MMOL/L (ref 3.6–5)
RBC # BLD: 2.78 M/UL (ref 4.2–5.4)
SODIUM BLD-SCNC: 133 MMOL/L (ref 135–145)
WBC # BLD: 8.1 THOU/ML3 (ref 4.8–10.8)

## 2024-08-16 LAB
ANION GAP SERPL CALCULATED.3IONS-SCNC: 11.2 MMOL/L (ref 3–11)
BUN BLDV-MCNC: 14 MG/DL (ref 7–17)
CALCIUM SERPL-MCNC: 8.2 MG/DL (ref 8.4–10.2)
CHLORIDE BLD-SCNC: 101 MMOL/L (ref 98–120)
CO2: 25 MMOL/L (ref 22–31)
CREAT SERPL-MCNC: 0.8 MG/DL (ref 0.5–1)
CREATININE CLEARANCE: 38.97
GFR, ESTIMATED: > 60
GLUCOSE: 117 MG/DL (ref 65–105)
HCT VFR BLD CALC: 31.9 % (ref 37–47)
HEMOGLOBIN: 9.9 G/DL (ref 12–16)
POTASSIUM SERPL-SCNC: 4.2 MMOL/L (ref 3.6–5)
SODIUM BLD-SCNC: 133 MMOL/L (ref 135–145)

## 2024-08-19 ENCOUNTER — TELEPHONE (OUTPATIENT)
Dept: FAMILY MEDICINE CLINIC | Age: 79
End: 2024-08-19

## 2024-08-19 NOTE — TELEPHONE ENCOUNTER
D/C to [facility].  Room 204    Marga states she is doing much better today. The plan is for her to be at [facility] for a short time and then back to [place]. Marga will call us when she d/c to [place].

## 2024-08-20 ENCOUNTER — TELEPHONE (OUTPATIENT)
Dept: PHARMACY | Facility: CLINIC | Age: 79
End: 2024-08-20

## 2024-08-20 NOTE — TELEPHONE ENCOUNTER
PLAN  The following are interventions that have been identified:   Patient OVERDUE refilling losartan and active on home medication list    Per chart review, appears patient's losartan dose has been being decreased - it was 100mg daily with 100mg tablets, then changed to 50mg daily and new Rx sent to pharmacy for 50mg tabs, and lastly it was decreased to 25mg daily in May with PCP advising patient to cut current 50mg tablets in half. Because of this, patient may not need a refill.     Additionally, patient had a fall and was discharged from [facility] on 08/17/2024 to [facility] and likely to remain there until at least 08/29/2024. Upon discharge, she will follow-up with PCP when she is back in her home at [facility]. No outreach at this time.     Recent Visits  Date Type Provider Dept   07/08/24 [facility]   05/29/24 [facility]   03/27/24 Office Visit [facility]   02/15/24 Office Visit [facility]   10/23/23 Office Visit [facility]   08/02/23 Office Visit [facility]   06/14/23 Office Visit [facility]   04/24/23 Office Visit Chaparrita Bermudez MD [facility]   Showing recent visits within past 540 days with a meds authorizing provider and meeting all other requirements  Future Appointments  Date Type Provider Dept   09/12/24 Appointment [facility]   Showing future appointments within next 150 days with a meds authorizing provider and meeting all other requirements    Lia Alfonso, PharmD, BCACP, BCGP  Population Health Pharmacist   [facility] Clinical Pharmacy  Department, toll free: [phone], option 1    =======================================================    For Pharmacy Admin Tracking

## 2024-08-21 ENCOUNTER — OUTSIDE SERVICES (OUTPATIENT)
Dept: INTERNAL MEDICINE | Age: 79
End: 2024-08-21
Payer: MEDICARE

## 2024-08-21 ENCOUNTER — PATIENT MESSAGE (OUTPATIENT)
Dept: FAMILY MEDICINE CLINIC | Age: 79
End: 2024-08-21

## 2024-08-21 DIAGNOSIS — C34.12 MALIGNANT NEOPLASM OF UPPER LOBE OF LEFT LUNG (HCC): ICD-10-CM

## 2024-08-21 DIAGNOSIS — J41.0 SIMPLE CHRONIC BRONCHITIS (HCC): ICD-10-CM

## 2024-08-21 DIAGNOSIS — Z87.81 S/P RIGHT HIP FRACTURE: ICD-10-CM

## 2024-08-21 DIAGNOSIS — I10 ESSENTIAL HYPERTENSION: ICD-10-CM

## 2024-08-21 DIAGNOSIS — I65.23 BILATERAL CAROTID ARTERY STENOSIS: ICD-10-CM

## 2024-08-21 DIAGNOSIS — I71.40 ABDOMINAL AORTIC ANEURYSM (AAA) 3.0 CM TO 5.0 CM IN DIAMETER IN FEMALE (HCC): Primary | ICD-10-CM

## 2024-08-21 PROCEDURE — 99305 1ST NF CARE MODERATE MDM 35: CPT | Performed by: INTERNAL MEDICINE

## 2024-08-23 LAB
BACTERIA, URINE: ABNORMAL /HPF
BILIRUBIN, URINE: NEGATIVE
BLOOD, URINE: ABNORMAL
CASTS UA: ABNORMAL /LPF
CLARITY, UA: ABNORMAL
COLOR, UA: YELLOW
CRYSTALS, UA: ABNORMAL
GLUCOSE URINE: NEGATIVE MG/DL
KETONES, URINE: NEGATIVE MG/DL
LEUKOCYTE ESTERASE, URINE: NEGATIVE
NITRITE, URINE: NEGATIVE
PH, URINE: 7 (ref 5–8.5)
PROTEIN UA: ABNORMAL MG/DL
RBC URINE: ABNORMAL /HPF (ref 0–2)
SPECIFIC GRAVITY UA: 1.01 MG/DL (ref 1–1.03)
SQUAMOUS EPITHELIAL: ABNORMAL /HPF
UROBILINOGEN, URINE: 0.2 MG/DL (ref 0.2–1)
WBC URINE: ABNORMAL /HPF (ref 0–4)

## 2024-08-24 LAB
ANION GAP SERPL CALCULATED.3IONS-SCNC: 10.7 MMOL/L (ref 3–11)
BUN BLDV-MCNC: 15 MG/DL (ref 7–17)
CALCIUM SERPL-MCNC: 8.9 MG/DL (ref 8.4–10.2)
CHLORIDE BLD-SCNC: 99 MMOL/L (ref 98–120)
CO2: 29 MMOL/L (ref 22–31)
CREAT SERPL-MCNC: 0.8 MG/DL (ref 0.5–1)
GFR, ESTIMATED: > 60
GLUCOSE: 95 MG/DL (ref 65–105)
POTASSIUM SERPL-SCNC: 4.7 MMOL/L (ref 3.6–5)
SODIUM BLD-SCNC: 134 MMOL/L (ref 135–145)

## 2024-09-19 ENCOUNTER — TELEPHONE (OUTPATIENT)
Dept: FAMILY MEDICINE CLINIC | Age: 79
End: 2024-09-19

## 2024-09-23 ENCOUNTER — TELEPHONE (OUTPATIENT)
Dept: FAMILY MEDICINE CLINIC | Age: 79
End: 2024-09-23

## 2024-09-23 NOTE — PROGRESS NOTES
The patient's goals for the shift include  have controled pain     The clinical goals for the shift include will report less pain    Problem: Skin  Goal: Prevent/manage excess moisture  Outcome: Progressing     Problem: Safety - Adult  Goal: Free from fall injury  Outcome: Progressing     Problem: Pain - Adult  Goal: Verbalizes/displays adequate comfort level or baseline comfort level  Outcome: Progressing     Problem: Fall/Injury  Goal: Not fall by end of shift  Outcome: Progressing          500 mg by mouth every 6 hours as needed for Pain      ibuprofen (ADVIL;MOTRIN) 200 MG tablet Take 200 mg by mouth every 6 hours as needed for Pain      Ascorbic Acid (VITAMIN C) 500 MG CAPS Vitamin C TABS  Refills: 0  Active      Calcium Carbonate-Vit D-Min (CALCIUM 1200 PO) Take by mouth          Medications patient taking as of now reconciled against medications ordered at time of hospital discharge: Yes    Chief Complaint   Patient presents with    Follow-Up from [address] [place] [address] - d/c [facility] 3/26/21 - TIA / altered mental status    Advance Care Planning     would like to discuss code status       Had been taking prednisone and Grand daughter noticed that she had a \"full face\" on zoom but other than that was perfectly normal.  The next morning she sent her daughter a text that was not normal-- very bizarre. Daughter responded back but could not respond then either. Then daughter called her and she made no sense-- could not find the words to speak. Very confused. Other daughter went in to find her confused, shaking standing in her living room. Mau Hahn does not remember bits and pieces of this. Evidently she had an appointment that morning for her chemo and recalls all of that but then abruptly lost her sense of self. Was transferred to [address] [place] [address] with the diagnosis of possible CVA. She had presented to the emergency room with slurring of speech and noted [facility].  She also had a slight bump in her troponin the dose was minimal.  Her EKG was negative. With the concern of a stroke and also her underlying cancer diagnosis it was felt that she needed a urgent neurologic consultation as well as a urgent MRI and which transferred to a tertiary care center for these procedures. Exhaustive evaluation was performed at that facility. MRI at that time ruled out a true CVA. A UTI was discovered and she was discharged home in cephalexin 3 times daily for 5 days.  (They have been taking twice daily doing to difficulty with scheduling). Carotid Dopplers and echo were also performed which showed no occlusive lesions in the carotids and a normal echo with ejection fraction of 55 to 60%. v   Diagnosis at the conclusion was UTI:      Inpatient course: Discharge summary reviewed- see chart. Interval history/Current status: Neurologically been completely back to normal. Has been taking her antibiotics 2 times     Has had cramping in her fingers. Has been having locking in the fingers, cramping and will have to literally unlock her fingers. This was preceding all of her current symptoms. Is also on the prednisone- was on 40 mg daily from Dr. Reuben Reyes. (started they believe in about February) Did self reduce in early March to 20 mg. And then stopped at the hospital-- has not had any since that time. Has not been taking any prednisone or any levothyroxine since her hospital stay. Appetite has been good. Vitals:    03/29/21 0919   BP: 120/68   Site: Right Upper Arm   Position: Sitting   Cuff Size: Medium Adult   Pulse: 94   SpO2: 99%   Weight: 110 lb 4.8 oz (50 kg)   Height: 5' 2.6\" (1.59 m)     Body mass index is 19.79 kg/m². Wt Readings from Last 3 Encounters:   03/29/21 110 lb 4.8 oz (50 kg)   08/26/20 111 lb 11.2 oz (50.7 kg)   11/18/19 103 lb 11.2 oz (47 kg)     BP Readings from Last 3 Encounters:   03/29/21 120/68   08/26/20 128/82   11/18/19 124/76       Review of Systems    Physical Exam  Vitals signs and nursing note reviewed. Constitutional:       General: She is not in acute distress. Appearance: Normal appearance. She is well-developed. She is not ill-appearing. Comments: Alert and oriented but does appear slightly fatigued, thin but overall comfortable   HENT:      Head: Normocephalic. Right Ear: External ear normal.      Left Ear: External ear normal.   Eyes:      General: No scleral icterus.      Conjunctiva/sclera: Conjunctivae normal.      Pupils: Pupils are equal, round, and reactive to light. Neck:      Musculoskeletal: Normal range of motion and neck supple. No muscular tenderness. Thyroid: No thyromegaly. Vascular: Carotid bruit (bilateral carotid bruits R>L) present. Cardiovascular:      Rate and Rhythm: Normal rate and regular rhythm. Pulses: Normal pulses. Heart sounds: Normal heart sounds. No murmur. Pulmonary:      Effort: Pulmonary effort is normal.      Breath sounds: Normal breath sounds. Abdominal:      General: Bowel sounds are normal. There is no distension. Palpations: Abdomen is soft. There is no mass. Tenderness: There is no abdominal tenderness. There is no right CVA tenderness or left CVA tenderness. Musculoskeletal:         General: No deformity. Right lower leg: Edema present. Left lower leg: Edema present. Comments: Trace LE edema   Lymphadenopathy:      Cervical: Cervical adenopathy (Still small lymph node on the left upper anterior cervical top triangle but stable) present. Skin:     Capillary Refill: Capillary refill takes less than 2 seconds. Findings: No erythema. Neurological:      General: No focal deficit present. Mental Status: She is alert and oriented to person, place, and time. Cranial Nerves: No cranial nerve deficit. Sensory: No sensory deficit. Coordination: Coordination normal.      Comments: Rapid alternating movements finger-nose and strength and sensation are all completely normal.  Gait is slightly unsteady but this appears to be related to the significant valgus deformity she has from OA in the left knee. Slight left-sided hand tremor but no weakness or discoordination. Psychiatric:         Mood and Affect: Mood normal.         Behavior: Behavior normal.         Thought Content: Thought content normal.         Judgment: Judgment normal.         Assessment/Plan:  1.  TIA (transient ischemic attack)  Based on the history of her as well as you can to get the 3 times daily dosing. (D/C'ed after visit when culture results came in to the office)    If resuming the prednisone then would absolutely would start on something like famotidine 40 mg daily (at bedtime) to protect from stomach acid. Restart the levothyroxine and recheck these labs in June 2021 with the other lab draw at oncology        [address]   The patient has the following advanced directives on file:  [address] [place] ACP-Advance Directive ACP-Power of     Not on File Filed on 03/29/21 Filed Not on File          The patient has appointed the following active healthcare agents:  Daughter in law Lynn Osullivan and Daughter Bryan Gutiérrez    The Patient has the following current code status:    Code Status: DNR-CCA    Visit Documentation:  I called and spoke with Nima Muhammad regarding [facility] [address]. I discussed Advance Care Planning with Nima Muhammad today which included the importance of making their choices for care and treatment in the case of a health event that adversely affects their decision-making abilities. She has not completed the Advance Care Directives until today was contemplating a DNR status. We discussed the difference between DNR arrest and DNR CC at length today with both Mercy Yoder and her daughter China Giron. . She has an active health care agent at this time. Nima Muhammad was assisted with completion of the DNR form today and provided with a signed copy of her medical records.     Time spent 5- 10 minutes      Toney Ibarra MD  3/29/2021         Medical Decision Making: moderate complexity

## 2024-09-26 ENCOUNTER — OFFICE VISIT (OUTPATIENT)
Dept: FAMILY MEDICINE CLINIC | Age: 79
End: 2024-09-26
Payer: MEDICARE

## 2024-09-26 VITALS
OXYGEN SATURATION: 98 % | HEART RATE: 84 BPM | SYSTOLIC BLOOD PRESSURE: 136 MMHG | DIASTOLIC BLOOD PRESSURE: 74 MMHG | BODY MASS INDEX: 19.66 KG/M2 | WEIGHT: 111 LBS

## 2024-09-26 DIAGNOSIS — Z09 HOSPITAL DISCHARGE FOLLOW-UP: ICD-10-CM

## 2024-09-26 DIAGNOSIS — I10 ESSENTIAL HYPERTENSION: ICD-10-CM

## 2024-09-26 DIAGNOSIS — Z23 NEEDS FLU SHOT: ICD-10-CM

## 2024-09-26 DIAGNOSIS — D50.0 IRON DEFICIENCY ANEMIA SECONDARY TO BLOOD LOSS (CHRONIC): ICD-10-CM

## 2024-09-26 DIAGNOSIS — Z87.81 HISTORY OF FRACTURE OF RIGHT HIP: Primary | ICD-10-CM

## 2024-09-26 DIAGNOSIS — E78.2 MIXED HYPERLIPIDEMIA: ICD-10-CM

## 2024-09-26 PROCEDURE — 90653 IIV ADJUVANT VACCINE IM: CPT | Performed by: FAMILY MEDICINE

## 2024-09-26 PROCEDURE — 99212 OFFICE O/P EST SF 10 MIN: CPT | Performed by: FAMILY MEDICINE

## 2024-09-26 RX ORDER — GLUCOSAMINE HCL 500 MG
400 TABLET ORAL DAILY
COMMUNITY

## 2024-09-26 RX ORDER — ACETAMINOPHEN 500 MG
500 TABLET ORAL 3 TIMES DAILY PRN
Qty: 120 TABLET | Refills: 5
Start: 2024-09-26

## 2024-09-26 NOTE — PROGRESS NOTES
Have you had an allergic reaction to the flu (influenza) shot? no  Are you allergic to eggs or any component of the flu vaccine? no  Do you have a history of Guillain-Slater Syndrome (GBS), which is paralysis after receiving the flu vaccine? no  Are you feeling well today? yes  Flu vaccine given as ordered.  Patient tolerated it well.  No questions re: VIS information.    After obtaining consent, and per orders of Chaparrita Bermudez MD, injection of FLU VACCINE given in Left deltoid by Dangelo Elmore MA. Patient tolerated well.  Patient instructed to remain in clinic for 20 minutes afterwards, and to report any adverse reaction immediately.

## 2024-09-26 NOTE — PROGRESS NOTES
Post-Discharge Transitional Care Follow Up    Elzbieta Dash   YOB: 1945    Date of Office Visit:  9/26/2024  Date of Hospital Admission: 8/13-8/17 at [facility]- right femoral neck fracture, then to [facility]  Date of Hospital Discharge: 9/19/2024    Care management risk score Rising risk (score 2-5) and Complex Care (Scores >=6): No Risk Score On File     Non face to face  following discharge, date last encounter closed (first attempt may have been earlier): *No documented post hospital discharge outreach found in the last 14 days     Call initiated 2 business days of discharge: *No response recorded in the last 14 days    ASSESSMENT/PLAN:   History of fracture of right hip  Essential hypertension  Iron deficiency anemia secondary to blood loss (chronic)  Needs flu shot  -     Influenza, FLUAD Trivalent, (age 65 y+), IM, Preservative Free, 0.5mL  Hospital discharge follow-up  -     [place] DISCHARGE MEDS RECONCILED W/ CURRENT OUTPATIENT MED LIST  Mixed hyperlipidemia    For jumpy legs will check the iron and if not coming up consider IV iron and see if this helps-- if iron OK then consider requip.     HTN is asx and well controlled at this time.     Other medical issues also stable and well controlled at this time.     Medical Decision Making: moderate complexity  No follow-ups on file.           Subjective:   HPI    Was afmitted to [facility] after a fall at the assisted living. Had right hip fractured repaired and then discharged to [facility] to rehab.     Inpatient course: Discharge summary reviewed- see chart.    Interval history/Current status: Is working with the speech therapist to improve the speech fluency and processing.    Has the Keytruda next week and the CT of the aorta also to check for this-- to see if this is possible to try a stent.     Is doing better with her reading and is able to read her books for example.    Has had a lot more \"jumpy legs\" all day since she had her hip replacement.

## 2024-10-02 ENCOUNTER — ENROLLMENT (OUTPATIENT)
Dept: PHARMACY | Facility: CLINIC | Age: 79
End: 2024-10-02

## 2024-10-02 LAB
CHOLESTEROL, TOTAL: 138 MG/DL (ref 50–200)
CHOLESTEROL/HDL RATIO: 1.77 RATIO (ref 0–4.5)
HDLC SERPL-MCNC: 78 MG/DL (ref 36–68)
IRON % SATURATION: 33 % (ref 15–38)
IRON: 79 MG/DL (ref 37–170)
LDL CHOLESTEROL: 47.8 MG/DL (ref 0–160)
TOTAL IRON BINDING CAPACITY: 242 MG/DL (ref 261–497)
TRIGL SERPL-MCNC: 61 MG/DL (ref 10–250)
VLDLC SERPL CALC-MCNC: 12 MG/DL (ref 0–50)

## 2024-10-08 ENCOUNTER — TELEPHONE (OUTPATIENT)
Dept: FAMILY MEDICINE CLINIC | Age: 79
End: 2024-10-08

## 2024-10-10 LAB
ANION GAP SERPL CALCULATED.3IONS-SCNC: 10.3 MMOL/L (ref 3–11)
BUN BLDV-MCNC: 19 MG/DL (ref 7–17)
CALCIUM SERPL-MCNC: 8.6 MG/DL (ref 8.4–10.2)
CHLORIDE BLD-SCNC: 101 MMOL/L (ref 98–120)
CO2: 26 MMOL/L (ref 22–31)
CREAT SERPL-MCNC: 0.7 MG/DL (ref 0.5–1)
CREATININE CLEARANCE: 37.28
GFR, ESTIMATED: > 60
GLUCOSE: 96 MG/DL (ref 65–105)
POTASSIUM SERPL-SCNC: 4.3 MMOL/L (ref 3.6–5)
SODIUM BLD-SCNC: 133 MMOL/L (ref 135–145)

## 2024-10-14 ENCOUNTER — TELEPHONE (OUTPATIENT)
Dept: PHARMACY | Facility: CLINIC | Age: 79
End: 2024-10-14

## 2024-10-14 NOTE — TELEPHONE ENCOUNTER
Chaparrita Bermudez MD, please see below - would patient benefit from DEXA and pharmacologic treatment of osteoporosis due to recent fracture?  Most recent DEXA results in 2019 show T-Score of -2.6 of femur and recent right femoral neck fracture after a fall on 8/13/2024; would patient be a good candidate for a repeat DEXA and/or consideration of starting denosumab 60 mg every 6 months?  Unclear if patient has previously taken alendronate; DEXA scan mentions her being on in 2019 but cannot find in chart  Patient appears to be higher risk d/t recent fracture and hx of T-Score of -2.6, possibly warranting use of denosumab over a bisphosphonate  Lastly, would consider getting an updated vitamin D level as well    If appropriate, please order and have your staff notify patient.    Thank you,  Placido Bhatt (Robbie), PharmD  PGY2 Pharmacy Resident  [facility]  Department, toll free: [phone], option 1    ==================================================================  [facility] CLINICAL PHARMACY REVIEW: RECENT FRACTURE    Elzbieta Dash is a 79 y.o. old White (non-) female patient who recently had a fracture of right femoral neck (8/13/2024)    Pertinent Medications:   Current Outpatient Medications   Medication Sig Dispense Refill    Calcium Carbonate-Vit D-Min (CALCIUM 1200 PO) Take by mouth       Labs:   Lab Results   Component Value Date    VITD25 18.4 (L) 02/24/2020      Lab Results   Component Value Date    CALCIUM 8.6 10/10/2024     estimated creatinine clearance is 53 mL/min (based on SCr of 0.7 mg/dL).    DEXA:      FRAX-calculated 10-year fracture probability:   Per WHO calculator: major Osteoporotic = 33% and Hip = 17%    Assessment:   AACE recommends BMD testing in adults who have a fracture at or after age 50; USPSTF and ACP recommend DEXA for women at or after age 65  79 y.o. female w/ recent fracture; last DEXA in 2019; may benefit from repeat DEXA to assess

## 2024-10-17 ENCOUNTER — OFFICE VISIT (OUTPATIENT)
Dept: FAMILY MEDICINE CLINIC | Age: 79
End: 2024-10-17
Payer: MEDICARE

## 2024-10-17 VITALS
HEART RATE: 60 BPM | WEIGHT: 114 LBS | DIASTOLIC BLOOD PRESSURE: 78 MMHG | SYSTOLIC BLOOD PRESSURE: 144 MMHG | BODY MASS INDEX: 20.19 KG/M2 | OXYGEN SATURATION: 96 %

## 2024-10-17 DIAGNOSIS — D50.0 IRON DEFICIENCY ANEMIA SECONDARY TO BLOOD LOSS (CHRONIC): ICD-10-CM

## 2024-10-17 DIAGNOSIS — I95.1 ORTHOSTATIC HYPOTENSION: ICD-10-CM

## 2024-10-17 DIAGNOSIS — N18.32 STAGE 3B CHRONIC KIDNEY DISEASE (HCC): Primary | ICD-10-CM

## 2024-10-17 DIAGNOSIS — Z09 HOSPITAL DISCHARGE FOLLOW-UP: ICD-10-CM

## 2024-10-17 LAB
ANION GAP SERPL CALCULATED.3IONS-SCNC: 8 MMOL/L (ref 3–11)
BUN BLDV-MCNC: 13 MG/DL (ref 7–17)
CALCIUM SERPL-MCNC: 8.4 MG/DL (ref 8.4–10.2)
CHLORIDE BLD-SCNC: 102 MMOL/L (ref 98–120)
CO2: 27 MMOL/L (ref 22–31)
CREAT SERPL-MCNC: 0.7 MG/DL (ref 0.5–1)
GFR, ESTIMATED: > 60
GLUCOSE: 85 MG/DL (ref 65–105)
POTASSIUM SERPL-SCNC: 4 MMOL/L (ref 3.6–5)
SODIUM BLD-SCNC: 133 MMOL/L (ref 135–145)

## 2024-10-17 PROCEDURE — 99212 OFFICE O/P EST SF 10 MIN: CPT | Performed by: FAMILY MEDICINE

## 2024-10-17 RX ORDER — FLUDROCORTISONE ACETATE 0.1 MG/1
0.1 TABLET ORAL EVERY MORNING
COMMUNITY
Start: 2024-10-10

## 2024-10-17 RX ORDER — BISOPROLOL FUMARATE 5 MG/1
2.5 TABLET, FILM COATED ORAL DAILY
COMMUNITY
Start: 2024-10-10

## 2024-10-23 ENCOUNTER — TELEPHONE (OUTPATIENT)
Dept: FAMILY MEDICINE CLINIC | Age: 79
End: 2024-10-23

## 2024-10-23 DIAGNOSIS — E87.6 HYPOKALEMIA: Primary | ICD-10-CM

## 2024-10-23 RX ORDER — POTASSIUM CHLORIDE 600 MG/1
8 TABLET, FILM COATED, EXTENDED RELEASE ORAL DAILY
Qty: 30 TABLET | Refills: 3 | Status: SHIPPED | OUTPATIENT
Start: 2024-10-23

## 2024-10-25 ENCOUNTER — TELEPHONE (OUTPATIENT)
Dept: FAMILY MEDICINE CLINIC | Age: 79
End: 2024-10-25

## 2024-10-25 LAB
BACTERIA, URINE: ABNORMAL /HPF
BILIRUBIN, URINE: NEGATIVE
BLOOD, URINE: ABNORMAL
CASTS UA: ABNORMAL /LPF
CLARITY, UA: CLEAR
COLOR, UA: YELLOW
CRYSTALS, UA: ABNORMAL
GLUCOSE URINE: NEGATIVE MG/DL
KETONES, URINE: NEGATIVE MG/DL
LEUKOCYTE ESTERASE, URINE: NEGATIVE
NITRITE, URINE: NEGATIVE
PH, URINE: 7.5 (ref 5–8.5)
PROTEIN UA: ABNORMAL MG/DL
RBC URINE: ABNORMAL /HPF (ref 0–2)
SPECIFIC GRAVITY UA: 1.02 MG/DL (ref 1–1.03)
SQUAMOUS EPITHELIAL: ABNORMAL /HPF
UROBILINOGEN, URINE: 0.2 MG/DL (ref 0.2–1)
WBC URINE: ABNORMAL /HPF (ref 0–4)

## 2024-10-25 NOTE — TELEPHONE ENCOUNTER
Daughter called regarding UA results.  Dose she need an antibiotic?  [phone]  Nurse Almita at [facility].      See Fax.

## 2024-10-26 NOTE — PROGRESS NOTES
discharge reviewed: {YES/NO:19732}    Medications marked \"taking\" at this time  Outpatient Medications Marked as Taking for the 10/17/24 encounter (Office Visit) with Chaparrita Bermudez MD   Medication Sig Dispense Refill   • bisoprolol (ZEBETA) 5 MG tablet Take 0.5 tablets by mouth daily     • fludrocortisone (FLORINEF) 0.1 MG tablet Take 1 tablet by mouth every morning     • Cranberry 400 MG TABS Take 400 mg by mouth daily     • gabapentin (NEURONTIN) 300 MG capsule Take 1 capsule by mouth in the morning and at bedtime for 90 days. 180 capsule 0   • sodium chloride 1 g tablet Take 2 tablets by mouth daily 180 tablet 3   • atorvastatin (LIPITOR) 40 MG tablet Take 1 tablet by mouth daily 90 tablet 3   • fluticasone (FLONASE) 50 MCG/ACT nasal spray instill 2 spray into each nostril once daily 48 g 3   • escitalopram (LEXAPRO) 10 MG tablet take 1 tablet by mouth once daily 90 tablet 3   • levothyroxine (SYNTHROID) 25 MCG tablet take 1 tablet by mouth once daily 90 tablet 3   • vitamin B-12 (CYANOCOBALAMIN) 1000 MCG tablet Take 0.5 tablets by mouth daily     • ferrous sulfate (SLOW FE) 142 (45 Fe) MG extended release tablet Take 142 mg by mouth daily     • pembrolizumab (KEYTRUDA) 100 MG/4ML SOLN Infuse intravenously     • Ascorbic Acid (VITAMIN C) 500 MG CAPS Vitamin C TABS  Refills: 0  Active     • Calcium Carbonate-Vit D-Min (CALCIUM 1200 PO) Take by mouth          Medications patient taking as of now reconciled against medications ordered at time of hospital discharge: {YES / NO:19727}    Review of Systems    Objective:    BP (!) 144/78 (Site: Left Upper Arm, Position: Standing, Cuff Size: Medium Adult)   Pulse 60   Wt 51.7 kg (114 lb)   SpO2 96%   BMI 20.19 kg/m²   Physical Exam      An electronic signature was used to authenticate this note.  --Chaparrita Bermudez MD          
Normal pulses.      Heart sounds: Normal heart sounds. No murmur heard.     No friction rub. No gallop.   Pulmonary:      Effort: Pulmonary effort is normal. No respiratory distress.      Breath sounds: Normal breath sounds.   Abdominal:      Palpations: Abdomen is soft. There is no mass.      Tenderness: There is no abdominal tenderness. There is no right CVA tenderness or left CVA tenderness.   Musculoskeletal:      Cervical back: Normal range of motion and neck supple.      Right lower leg: No edema.      Left lower leg: No edema.   Lymphadenopathy:      Cervical: No cervical adenopathy.   Skin:     General: Skin is warm.      Capillary Refill: Capillary refill takes less than 2 seconds.   Neurological:      General: No focal deficit present.      Mental Status: She is alert and oriented to person, place, and time.   Psychiatric:         Mood and Affect: Mood normal.         Behavior: Behavior normal.         Thought Content: Thought content normal.         Judgment: Judgment normal.               Multiple labs and other testing may have been ordered which may not be completely evident from the above note due to system interface incompatibilities.     Patient given educational materials - see patientinstructions.  Discussed use, benefit, and side effects of prescribed medications.  All patient questions answered.  Pt voiced understanding. Reviewed health maintenance.  Instructed to continue current medications, diet andexercise.  Patient agreed with treatment plan. Follow up as directed.     The patient (or guardian, if applicable) and other individuals in attendance with the patient were advised that Artificial Intelligence will be utilized during this visit to record, process the conversation to generate a clinical note, and support improvement of the AI technology. The patient (or guardian, if applicable) and other individuals in attendance at the appointment consented to the use of AI, including the

## 2024-10-28 ENCOUNTER — TELEPHONE (OUTPATIENT)
Dept: FAMILY MEDICINE CLINIC | Age: 79
End: 2024-10-28

## 2024-10-30 ENCOUNTER — TELEPHONE (OUTPATIENT)
Dept: FAMILY MEDICINE CLINIC | Age: 79
End: 2024-10-30

## 2024-10-31 NOTE — TELEPHONE ENCOUNTER
Care Transitions Initial Follow Up Call    Outreach made within 2 business days of discharge: Yes    Patient: Elzbieta Dash Patient : 1945   MRN: [id number]  Reason for Admission: confusion, fall  Discharge Date:   10/30/24      Spoke with: daughter    Discharge department/facility: [facility] Interactive Patient Contact:  Was patient able to fill all prescriptions: Yes  Was patient instructed to bring all medications to the follow-up visit: Yes  Is patient taking all medications as directed in the discharge summary? Yes  Does patient understand their discharge instructions: Yes  Does patient have questions or concerns that need addressed prior to 7-14 day follow up office visit: no    Additional needs identified to be addressed with provider  No needs identified             Scheduled appointment with PCP within 7-14 days    Follow Up  Future Appointments   Date Time Provider Department Center   2024  8:00 AM [facility]   2025  9:40 AM Chaparrita Bermudez MD [facility] DEP       Lynn Orta LPN

## 2024-11-04 LAB
ANION GAP SERPL CALCULATED.3IONS-SCNC: 6.3 MMOL/L (ref 3–11)
BUN BLDV-MCNC: 13 MG/DL (ref 7–17)
CALCIUM SERPL-MCNC: 8.7 MG/DL (ref 8.4–10.2)
CHLORIDE BLD-SCNC: 104 MMOL/L (ref 98–120)
CO2: 30 MMOL/L (ref 22–31)
CREAT SERPL-MCNC: 0.7 MG/DL (ref 0.5–1)
GFR, ESTIMATED: > 60
GLUCOSE: 91 MG/DL (ref 65–105)
POTASSIUM SERPL-SCNC: 3.3 MMOL/L (ref 3.6–5)
SODIUM BLD-SCNC: 137 MMOL/L (ref 135–145)

## 2024-11-05 ENCOUNTER — TELEPHONE (OUTPATIENT)
Dept: FAMILY MEDICINE CLINIC | Age: 79
End: 2024-11-05

## 2024-11-06 ENCOUNTER — OFFICE VISIT (OUTPATIENT)
Dept: FAMILY MEDICINE CLINIC | Age: 79
End: 2024-11-06

## 2024-11-06 ENCOUNTER — TELEPHONE (OUTPATIENT)
Dept: FAMILY MEDICINE CLINIC | Age: 79
End: 2024-11-06

## 2024-11-06 VITALS
BODY MASS INDEX: 19.13 KG/M2 | DIASTOLIC BLOOD PRESSURE: 70 MMHG | OXYGEN SATURATION: 93 % | SYSTOLIC BLOOD PRESSURE: 138 MMHG | HEART RATE: 73 BPM | WEIGHT: 108 LBS

## 2024-11-06 DIAGNOSIS — E44.1 MILD PROTEIN-CALORIE MALNUTRITION (HCC): ICD-10-CM

## 2024-11-06 DIAGNOSIS — N18.32 STAGE 3B CHRONIC KIDNEY DISEASE (HCC): ICD-10-CM

## 2024-11-06 DIAGNOSIS — E87.6 HYPOKALEMIA: ICD-10-CM

## 2024-11-06 DIAGNOSIS — E87.1 HYPONATREMIA: ICD-10-CM

## 2024-11-06 DIAGNOSIS — I95.1 ORTHOSTATIC HYPOTENSION: ICD-10-CM

## 2024-11-06 DIAGNOSIS — G20.B2 PARKINSON'S DISEASE WITH DYSKINESIA AND FLUCTUATING MANIFESTATIONS (HCC): ICD-10-CM

## 2024-11-06 DIAGNOSIS — I10 ESSENTIAL HYPERTENSION: ICD-10-CM

## 2024-11-06 DIAGNOSIS — H60.393 OTHER INFECTIVE ACUTE OTITIS EXTERNA OF BOTH EARS: ICD-10-CM

## 2024-11-06 DIAGNOSIS — D50.0 IRON DEFICIENCY ANEMIA SECONDARY TO BLOOD LOSS (CHRONIC): ICD-10-CM

## 2024-11-06 DIAGNOSIS — Z09 HOSPITAL DISCHARGE FOLLOW-UP: Primary | ICD-10-CM

## 2024-11-06 RX ORDER — CIPROFLOXACIN AND DEXAMETHASONE 3; 1 MG/ML; MG/ML
4 SUSPENSION/ DROPS AURICULAR (OTIC) 2 TIMES DAILY
Qty: 7.5 ML | Refills: 1 | Status: SHIPPED | OUTPATIENT
Start: 2024-11-06 | End: 2024-11-16

## 2024-11-06 RX ORDER — SODIUM CHLORIDE 1 G/1
1 TABLET ORAL DAILY
Qty: 180 TABLET | Refills: 3
Start: 2024-11-06 | End: 2024-11-06 | Stop reason: ALTCHOICE

## 2024-11-06 RX ORDER — CARBIDOPA AND LEVODOPA 25; 100 MG/1; MG/1
1 TABLET ORAL 3 TIMES DAILY
COMMUNITY

## 2024-11-06 RX ORDER — POTASSIUM CHLORIDE 600 MG/1
16 TABLET, FILM COATED, EXTENDED RELEASE ORAL DAILY
Qty: 30 TABLET | Refills: 3
Start: 2024-11-06

## 2024-11-06 NOTE — PROGRESS NOTES
Abdomen is soft. There is no mass.      Tenderness: There is no abdominal tenderness. There is no right CVA tenderness or left CVA tenderness.   Musculoskeletal:      Cervical back: Normal range of motion and neck supple.      Right lower leg: Edema present.      Left lower leg: Edema present.      Comments: Puffy around eyes right more than the left.    Lymphadenopathy:      Cervical: No cervical adenopathy.   Skin:     General: Skin is warm.      Capillary Refill: Capillary refill takes less than 2 seconds.   Neurological:      General: No focal deficit present.      Mental Status: She is alert and oriented to person, place, and time.   Psychiatric:         Mood and Affect: Mood normal.         Behavior: Behavior normal.         Thought Content: Thought content normal.         Judgment: Judgment normal.           An electronic signature was used to authenticate this note.  --Chaparrita Bermudez MD

## 2024-11-06 NOTE — TELEPHONE ENCOUNTER
Please check with Marga if she thinks Kaylan would be willing to dot he prolia-- It is usually well tolerated and can help strengthen the bone.  Will be done as a one time every 6 month injection.  I will order.

## 2024-11-06 NOTE — TELEPHONE ENCOUNTER
Marga called to let Dr Bermudez know that aKylan had her RSV vaccine done 11/4/2024 at [facility].

## 2024-11-06 NOTE — TELEPHONE ENCOUNTER
Spoke with Marga- She is going to talk with the other kids and with patient before her next appointment. Would like to then discuss at appointment in December.

## 2024-11-10 ENCOUNTER — PATIENT MESSAGE (OUTPATIENT)
Dept: FAMILY MEDICINE CLINIC | Age: 79
End: 2024-11-10

## 2024-11-10 DIAGNOSIS — E87.6 HYPOKALEMIA: ICD-10-CM

## 2024-11-11 ENCOUNTER — TELEPHONE (OUTPATIENT)
Dept: FAMILY MEDICINE CLINIC | Age: 79
End: 2024-11-11

## 2024-11-11 NOTE — TELEPHONE ENCOUNTER
Rosa notified by phone.   While on the phone they noticed increased confusion over the weekend and are wondering if  would like a UA

## 2024-11-12 NOTE — TELEPHONE ENCOUNTER
Please have Rosa change the thigh high compression socks to the knee high.     Have Rosa draw a BMP on 11/20/24.    Gail Griffin (Attending)

## 2024-11-14 LAB
BACTERIA, URINE: ABNORMAL /HPF
BILIRUBIN, URINE: NEGATIVE
BLOOD, URINE: ABNORMAL
CASTS UA: ABNORMAL /LPF
CLARITY, UA: ABNORMAL
COLOR, UA: YELLOW
CRYSTALS, UA: ABNORMAL
GLUCOSE URINE: NEGATIVE MG/DL
KETONES, URINE: NEGATIVE MG/DL
LEUKOCYTE ESTERASE, URINE: NEGATIVE
NITRITE, URINE: NEGATIVE
PH, URINE: 7.5 (ref 5–8.5)
PROTEIN UA: ABNORMAL MG/DL
RBC URINE: ABNORMAL /HPF (ref 0–2)
SPECIFIC GRAVITY UA: 1.01 MG/DL (ref 1–1.03)
SQUAMOUS EPITHELIAL: ABNORMAL /HPF
UROBILINOGEN, URINE: 0.2 MG/DL (ref 0.2–1)
WBC URINE: ABNORMAL /HPF (ref 0–4)

## 2024-11-15 ENCOUNTER — TELEPHONE (OUTPATIENT)
Dept: FAMILY MEDICINE CLINIC | Age: 79
End: 2024-11-15

## 2024-11-15 RX ORDER — POTASSIUM CHLORIDE 600 MG/1
16 TABLET, FILM COATED, EXTENDED RELEASE ORAL DAILY
Qty: 60 TABLET | Refills: 3 | Status: SHIPPED | OUTPATIENT
Start: 2024-11-15

## 2024-11-15 NOTE — TELEPHONE ENCOUNTER
Marga called, While waiting for urine cultures to come back she would like to know if she can take cefdinir, pt was prescribed this back in July but has never taken it and has the full script of 10 days BID, Please advise. Marga would like to know by end of day.

## 2024-11-15 NOTE — TELEPHONE ENCOUNTER
OK to start this.  Please make the other changes mentioned in the web message also.  Not high WBC count in the urine to make me convinced that this is all to blame.

## 2024-11-15 NOTE — TELEPHONE ENCOUNTER
would like to try to have Kaylan drop the morning Gabapentin to see if this helps with the weakness, disorientation and the speech.  Her pain has been well controlled so I am trying to cut any medications that may increase her symptoms.  As we add more meds sometimes the side effects can increase from others.  Please let me know how she does with this.  I will have the staff notify [facility] also,     Please notify the [facility] staff to drop the am gabapentin.

## 2024-11-19 ENCOUNTER — TELEPHONE (OUTPATIENT)
Dept: FAMILY MEDICINE CLINIC | Age: 79
End: 2024-11-19

## 2024-11-19 NOTE — TELEPHONE ENCOUNTER
[facility] requiring letter of medical necessity for 8/17 to cover transport from [facility] to nursing home.  They are denying saying it wasn't warranted but Marga says she could not even stand up or walk.  Let Marga know when done and she will .

## 2024-11-20 ENCOUNTER — PATIENT MESSAGE (OUTPATIENT)
Dept: FAMILY MEDICINE CLINIC | Age: 79
End: 2024-11-20

## 2024-11-20 LAB
ANION GAP SERPL CALCULATED.3IONS-SCNC: 6.2 MMOL/L (ref 3–11)
BUN BLDV-MCNC: 17 MG/DL (ref 7–17)
CALCIUM SERPL-MCNC: 8.7 MG/DL (ref 8.4–10.2)
CHLORIDE BLD-SCNC: 103 MMOL/L (ref 98–120)
CO2: 29 MMOL/L (ref 22–31)
CREAT SERPL-MCNC: 0.7 MG/DL (ref 0.5–1)
GFR, ESTIMATED: > 60
GLUCOSE: 89 MG/DL (ref 65–105)
POTASSIUM SERPL-SCNC: 4.2 MMOL/L (ref 3.6–5)
SODIUM BLD-SCNC: 134 MMOL/L (ref 135–145)

## 2024-11-21 RX ORDER — CARBIDOPA AND LEVODOPA 25; 100 MG/1; MG/1
1 TABLET ORAL 3 TIMES DAILY
Qty: 90 TABLET | Refills: 1 | Status: SHIPPED | OUTPATIENT
Start: 2024-11-21

## 2024-11-21 NOTE — TELEPHONE ENCOUNTER
Elzbieta Dash is requesting a refill on the following medication(s):  Requested Prescriptions     Pending Prescriptions Disp Refills    carbidopa-levodopa (SINEMET)  MG per tablet 90 tablet 1     Sig: Take 1 tablet by mouth 3 times daily       Last Visit Date (If Applicable):  11/6/2024    Next Visit Date:    12/5/2024

## 2024-11-25 ENCOUNTER — TELEPHONE (OUTPATIENT)
Dept: FAMILY MEDICINE CLINIC | Age: 79
End: 2024-11-25

## 2024-11-25 NOTE — TELEPHONE ENCOUNTER
Rx for potassium 16meq daily sent in 11/15- called [facility]. They have rx and can fill today.     Marga notified.

## 2024-11-25 NOTE — TELEPHONE ENCOUNTER
Marga stopped in and [facility] is refusing to fill the 16mg of potassium as they say she is not due for a refill until middle of December but the dosage has been double so she really needs this to be filled ASAP

## 2024-12-02 LAB
ANION GAP SERPL CALCULATED.3IONS-SCNC: 1.5 MMOL/L (ref 3–11)
BUN BLDV-MCNC: 18 MG/DL (ref 7–17)
CALCIUM SERPL-MCNC: 8.6 MG/DL (ref 8.4–10.2)
CHLORIDE BLD-SCNC: 104 MMOL/L (ref 98–120)
CO2: 34 MMOL/L (ref 22–31)
CREAT SERPL-MCNC: 0.9 MG/DL (ref 0.5–1)
GFR, ESTIMATED: > 60
GLUCOSE: 99 MG/DL (ref 65–105)
POTASSIUM SERPL-SCNC: 4.5 MMOL/L (ref 3.6–5)
SODIUM BLD-SCNC: 135 MMOL/L (ref 135–145)

## 2024-12-05 ENCOUNTER — OFFICE VISIT (OUTPATIENT)
Dept: FAMILY MEDICINE CLINIC | Age: 79
End: 2024-12-05
Payer: MEDICARE

## 2024-12-05 VITALS
HEART RATE: 66 BPM | OXYGEN SATURATION: 96 % | SYSTOLIC BLOOD PRESSURE: 132 MMHG | WEIGHT: 106 LBS | BODY MASS INDEX: 18.78 KG/M2 | DIASTOLIC BLOOD PRESSURE: 70 MMHG

## 2024-12-05 DIAGNOSIS — C34.12 MALIGNANT NEOPLASM OF UPPER LOBE OF LEFT LUNG (HCC): Primary | ICD-10-CM

## 2024-12-05 DIAGNOSIS — E03.9 ACQUIRED HYPOTHYROIDISM: ICD-10-CM

## 2024-12-05 DIAGNOSIS — R41.0 INTERMITTENT CONFUSION: ICD-10-CM

## 2024-12-05 DIAGNOSIS — N18.32 STAGE 3B CHRONIC KIDNEY DISEASE (HCC): ICD-10-CM

## 2024-12-05 PROCEDURE — 99214 OFFICE O/P EST MOD 30 MIN: CPT | Performed by: FAMILY MEDICINE

## 2024-12-05 NOTE — PROGRESS NOTES
[facility]  [address], [address]  [place] [address]  Dept: [phone]  Dept Fax:[phone]    Elzbieta Dash is a 79 y.o. female who presents today for her medical conditions/complaints as notedbelow.        Assessment/Plan:     Assessment & Plan  1. Confusion.  Her BMP results are within normal limits, indicating no electrolyte imbalance or renal dysfunction. The urine analysis showed 5-10 WBCs and 2+ bacteria, suggesting a minor infection, but the urine culture was negative. She was previously started on Cefdinir, which showed improvement after 5 days. However, it is unclear if the antibiotic was necessary. She is advised to maintain adequate hydration and nutrition. The use of a heating pad and additional Tylenol as needed was recommended for comfort. Monthly blood work will be conducted to monitor her condition.  Suspect that she may have mild early dementia and the fluctuations in her MS are more likely contributed to by this rather than the UTi at this time.     2. Cancer.  A pause in her Keytruda treatment was suggested to observe any changes in her condition. A follow-up PET scan is scheduled for February to reassess her status.    3. Weight loss.  She has lost weight and is advised to improve her nutrition intake. Meals prepared at home and stored in the refrigerator are recommended to ensure she gets adequate nutrition. She is encouraged to drink Boost for additional nutrition, although she is not fond of it.    Follow-up  Return in 3 months for follow up.      Assessment & Plan  Malignant neoplasm of upper lobe of left lung (HCC)    Acquired hypothyroidism    Stage 3b chronic kidney disease (HCC)     Intermittent confusion         Results  Laboratory Studies  Urine analysis showed 5-10 WBCs, 2+ bacteria, and 3-5 epithelial cells. Urine culture showed no growth. BMP results were normal with sodium, potassium, and creatinine levels all within normal

## 2024-12-16 ENCOUNTER — TELEPHONE (OUTPATIENT)
Dept: FAMILY MEDICINE CLINIC | Age: 79
End: 2024-12-16

## 2024-12-18 ENCOUNTER — TELEPHONE (OUTPATIENT)
Dept: FAMILY MEDICINE CLINIC | Age: 79
End: 2024-12-18

## 2024-12-18 DIAGNOSIS — M25.531 RIGHT WRIST PAIN: Primary | ICD-10-CM

## 2024-12-23 ENCOUNTER — OFFICE VISIT (OUTPATIENT)
Dept: FAMILY MEDICINE CLINIC | Age: 79
End: 2024-12-23

## 2024-12-23 ENCOUNTER — TELEPHONE (OUTPATIENT)
Dept: FAMILY MEDICINE CLINIC | Age: 79
End: 2024-12-23

## 2024-12-23 VITALS
DIASTOLIC BLOOD PRESSURE: 62 MMHG | SYSTOLIC BLOOD PRESSURE: 124 MMHG | HEART RATE: 66 BPM | WEIGHT: 104 LBS | BODY MASS INDEX: 18.42 KG/M2 | OXYGEN SATURATION: 95 %

## 2024-12-23 DIAGNOSIS — K90.0 CELIAC DISEASE: ICD-10-CM

## 2024-12-23 DIAGNOSIS — Z09 HOSPITAL DISCHARGE FOLLOW-UP: Primary | ICD-10-CM

## 2024-12-23 DIAGNOSIS — E78.2 MIXED HYPERLIPIDEMIA: ICD-10-CM

## 2024-12-23 DIAGNOSIS — F01.A4: ICD-10-CM

## 2024-12-23 DIAGNOSIS — N18.32 STAGE 3B CHRONIC KIDNEY DISEASE (HCC): ICD-10-CM

## 2024-12-23 DIAGNOSIS — I16.0 HYPERTENSIVE URGENCY: ICD-10-CM

## 2024-12-23 DIAGNOSIS — M25.531 ACUTE PAIN OF RIGHT WRIST: ICD-10-CM

## 2024-12-23 DIAGNOSIS — E44.1 MILD PROTEIN-CALORIE MALNUTRITION (HCC): ICD-10-CM

## 2024-12-23 DIAGNOSIS — S52.691A OTHER CLOSED FRACTURE OF DISTAL END OF RIGHT ULNA, INITIAL ENCOUNTER: ICD-10-CM

## 2024-12-23 DIAGNOSIS — I10 ESSENTIAL HYPERTENSION: ICD-10-CM

## 2024-12-23 DIAGNOSIS — C34.12 MALIGNANT NEOPLASM OF UPPER LOBE OF LEFT LUNG (HCC): ICD-10-CM

## 2024-12-23 DIAGNOSIS — E22.2 SIADH (SYNDROME OF INAPPROPRIATE ADH PRODUCTION) (HCC): ICD-10-CM

## 2024-12-23 DIAGNOSIS — E87.1 HYPONATREMIA: ICD-10-CM

## 2024-12-23 LAB
ANION GAP SERPL CALCULATED.3IONS-SCNC: 3.3 MMOL/L (ref 3–11)
BUN BLDV-MCNC: 19 MG/DL (ref 7–17)
CALCIUM SERPL-MCNC: 8.9 MG/DL (ref 8.4–10.2)
CHLORIDE BLD-SCNC: 102 MMOL/L (ref 98–120)
CO2: 29 MMOL/L (ref 22–31)
CREAT SERPL-MCNC: 0.9 MG/DL (ref 0.5–1)
GFR, ESTIMATED: > 60
GLUCOSE: 112 MG/DL (ref 65–105)
POTASSIUM SERPL-SCNC: 4.5 MMOL/L (ref 3.6–5)
SODIUM BLD-SCNC: 135 MMOL/L (ref 135–145)

## 2024-12-23 RX ORDER — LISINOPRIL 5 MG/1
5 TABLET ORAL DAILY
COMMUNITY
Start: 2024-12-22

## 2024-12-23 RX ORDER — ATORVASTATIN CALCIUM 40 MG/1
20 TABLET, FILM COATED ORAL DAILY
Qty: 90 TABLET | Refills: 3
Start: 2024-12-23

## 2024-12-23 RX ORDER — DIMENHYDRINATE 50 MG
50 TABLET ORAL EVERY 6 HOURS PRN
COMMUNITY

## 2024-12-23 RX ORDER — LOPERAMIDE HYDROCHLORIDE 2 MG/1
2 CAPSULE ORAL 4 TIMES DAILY PRN
COMMUNITY

## 2024-12-23 NOTE — TELEPHONE ENCOUNTER
Care Transitions Initial Follow Up Call    Outreach made within 2 business days of discharge: Yes    Patient: Elzbieta Dash Patient : 1945   MRN: [id number]  Reason for Admission: Confusion    Discharge Date:  2024        Spoke with: Daughter    [facility] Patient Contact:  Was patient able to fill all prescriptions: Yes  Was patient instructed to bring all medications to the follow-up visit: Yes  Is patient taking all medications as directed in the discharge summary? Yes  Does patient understand their discharge instructions: Yes  Does patient have questions or concerns that need addressed prior to 7-14 day follow up office visit: no    Additional needs identified to be addressed with provider  No needs identified             Scheduled appointment with PCP within 7-14 days    Follow Up  Future Appointments   Date Time Provider Department Center   3/6/2025  9:00 AM Chaparrita Bermudez MD [facility]       Dangelo Elmore MA  
H&P in process of filling out.    
Needs to wait until her hospital follow up.   
See note from today  
Implemented All Universal Safety Interventions:  [place] to call system. Call bell, personal items and telephone within reach. Instruct patient to call for assistance. Room bathroom lighting operational. Non-slip footwear when patient is off stretcher. Physically safe environment: no spills, clutter or unnecessary equipment. Stretcher in lowest position, wheels locked, appropriate side rails in place.

## 2024-12-23 NOTE — PROGRESS NOTES
Cranberry 400 MG TABS Take 400 mg by mouth daily      acetaminophen (TYLENOL) 500 MG tablet Take 1 tablet by mouth 3 times daily as needed for Pain 120 tablet 5    gabapentin (NEURONTIN) 300 MG capsule Take 1 capsule by mouth in the morning and at bedtime for 90 days. 180 capsule 0    escitalopram (LEXAPRO) 10 MG tablet take 1 tablet by mouth once daily 90 tablet 3    levothyroxine (SYNTHROID) 25 MCG tablet take 1 tablet by mouth once daily 90 tablet 3    vitamin B-12 (CYANOCOBALAMIN) 1000 MCG tablet Take 0.5 tablets by mouth daily      ferrous sulfate (SLOW FE) 142 (45 Fe) MG extended release tablet Take 142 mg by mouth daily      Probiotic Product (PROBIOTIC DAILY PO) Take by mouth      Ascorbic Acid (VITAMIN C) 500 MG CAPS Vitamin C TABS  Refills: 0  Active      Calcium Carbonate-Vit D-Min (CALCIUM 1200 PO) Take by mouth          Medications patient taking as of now reconciled against medications ordered at time of hospital discharge: Yes    Review of Systems    Objective:    /62 (Site: Left Upper Arm, Position: Sitting, Cuff Size: Medium Adult)   Pulse 66   Wt 47.2 kg (104 lb)   SpO2 95%   BMI 18.42 kg/m²   Physical Exam  Vitals and nursing note reviewed.   Constitutional:       Appearance: Normal appearance. She is well-developed.   HENT:      Head: Normocephalic and atraumatic.      Right Ear: Tympanic membrane and external ear normal.      Left Ear: Tympanic membrane and external ear normal.      Ears:      Comments: No drainage in either ear  Eyes:      Extraocular Movements: Extraocular movements intact.      Conjunctiva/sclera: Conjunctivae normal.   Neck:      Thyroid: No thyromegaly.      Vascular: Carotid bruit (bilateral carotid bruits) present. No JVD.   Cardiovascular:      Rate and Rhythm: Normal rate and regular rhythm.      Pulses: Normal pulses.      Heart sounds: Normal heart sounds. No murmur heard.     No friction rub. No gallop.   Pulmonary:      Effort: Pulmonary effort is normal.

## 2024-12-25 LAB
ANION GAP SERPL CALCULATED.3IONS-SCNC: 3.9 MMOL/L (ref 3–11)
BUN BLDV-MCNC: 18 MG/DL (ref 7–17)
CALCIUM SERPL-MCNC: 8.6 MG/DL (ref 8.4–10.2)
CHLORIDE BLD-SCNC: 104 MMOL/L (ref 98–120)
CO2: 27 MMOL/L (ref 22–31)
CREAT SERPL-MCNC: 0.8 MG/DL (ref 0.5–1)
GFR, ESTIMATED: > 60
GLUCOSE: 98 MG/DL (ref 65–105)
POTASSIUM SERPL-SCNC: 4.1 MMOL/L (ref 3.6–5)
SODIUM BLD-SCNC: 135 MMOL/L (ref 135–145)

## 2024-12-26 ENCOUNTER — TELEPHONE (OUTPATIENT)
Dept: FAMILY MEDICINE CLINIC | Age: 79
End: 2024-12-26

## 2025-01-08 ENCOUNTER — OUTSIDE SERVICES (OUTPATIENT)
Dept: INTERNAL MEDICINE | Age: 80
End: 2025-01-08

## 2025-01-08 DIAGNOSIS — I10 ESSENTIAL HYPERTENSION: ICD-10-CM

## 2025-01-08 DIAGNOSIS — G20.B2 PARKINSON'S DISEASE WITH DYSKINESIA AND FLUCTUATING MANIFESTATIONS (HCC): ICD-10-CM

## 2025-01-08 DIAGNOSIS — F01.A4: ICD-10-CM

## 2025-01-08 DIAGNOSIS — J41.0 SIMPLE CHRONIC BRONCHITIS (HCC): ICD-10-CM

## 2025-01-08 DIAGNOSIS — I71.40 ABDOMINAL AORTIC ANEURYSM (AAA) 3.0 CM TO 5.0 CM IN DIAMETER IN FEMALE (HCC): Primary | ICD-10-CM

## 2025-01-08 DIAGNOSIS — C34.12 MALIGNANT NEOPLASM OF UPPER LOBE OF LEFT LUNG (HCC): ICD-10-CM

## 2025-01-08 DIAGNOSIS — N18.32 STAGE 3B CHRONIC KIDNEY DISEASE (HCC): ICD-10-CM

## 2025-01-12 PROBLEM — G20.B2 PARKINSON'S DISEASE WITH DYSKINESIA AND FLUCTUATING MANIFESTATIONS (HCC): Status: ACTIVE | Noted: 2025-01-12

## 2025-01-12 PROBLEM — F01.A4: Status: ACTIVE | Noted: 2025-01-12

## 2025-01-28 RX ORDER — CARBIDOPA AND LEVODOPA 25; 100 MG/1; MG/1
1 TABLET ORAL 3 TIMES DAILY
Qty: 90 TABLET | Refills: 3 | Status: SHIPPED | OUTPATIENT
Start: 2025-01-28

## 2025-01-28 NOTE — TELEPHONE ENCOUNTER
Elzbieta Dash is requesting a refill on the following medication(s):  Requested Prescriptions     Pending Prescriptions Disp Refills    carbidopa-levodopa (SINEMET)  MG per tablet [Pharmacy Med Name: Carbidopa-Levodopa  MG Oral Tablet] 90 tablet 0     Sig: TAKE 1 TABLET BY MOUTH THREE TIMES DAILY       Last Visit Date (If Applicable):  12/23/2024    Next Visit Date:    3/6/2025

## 2025-03-05 SDOH — ECONOMIC STABILITY: INCOME INSECURITY: IN THE LAST 12 MONTHS, WAS THERE A TIME WHEN YOU WERE NOT ABLE TO PAY THE MORTGAGE OR RENT ON TIME?: NO

## 2025-03-05 SDOH — ECONOMIC STABILITY: FOOD INSECURITY: WITHIN THE PAST 12 MONTHS, YOU WORRIED THAT YOUR FOOD WOULD RUN OUT BEFORE YOU GOT MONEY TO BUY MORE.: NEVER TRUE

## 2025-03-05 SDOH — ECONOMIC STABILITY: FOOD INSECURITY: WITHIN THE PAST 12 MONTHS, THE FOOD YOU BOUGHT JUST DIDN'T LAST AND YOU DIDN'T HAVE MONEY TO GET MORE.: NEVER TRUE

## 2025-03-05 ASSESSMENT — PATIENT HEALTH QUESTIONNAIRE - PHQ9
SUM OF ALL RESPONSES TO PHQ QUESTIONS 1-9: 0
2. FEELING DOWN, DEPRESSED OR HOPELESS: NOT AT ALL
SUM OF ALL RESPONSES TO PHQ9 QUESTIONS 1 & 2: 0
1. LITTLE INTEREST OR PLEASURE IN DOING THINGS: NOT AT ALL
SUM OF ALL RESPONSES TO PHQ QUESTIONS 1-9: 0
SUM OF ALL RESPONSES TO PHQ QUESTIONS 1-9: 0
2. FEELING DOWN, DEPRESSED OR HOPELESS: NOT AT ALL
1. LITTLE INTEREST OR PLEASURE IN DOING THINGS: NOT AT ALL
SUM OF ALL RESPONSES TO PHQ QUESTIONS 1-9: 0

## 2025-03-06 ENCOUNTER — OFFICE VISIT (OUTPATIENT)
Dept: FAMILY MEDICINE CLINIC | Age: 80
End: 2025-03-06
Payer: MEDICARE

## 2025-03-06 VITALS
BODY MASS INDEX: 18.42 KG/M2 | WEIGHT: 104 LBS | HEART RATE: 71 BPM | SYSTOLIC BLOOD PRESSURE: 130 MMHG | DIASTOLIC BLOOD PRESSURE: 68 MMHG | OXYGEN SATURATION: 99 %

## 2025-03-06 DIAGNOSIS — M85.89 OSTEOPENIA OF MULTIPLE SITES: ICD-10-CM

## 2025-03-06 DIAGNOSIS — E87.1 HYPONATREMIA: ICD-10-CM

## 2025-03-06 DIAGNOSIS — C34.12 MALIGNANT NEOPLASM OF UPPER LOBE OF LEFT LUNG (HCC): ICD-10-CM

## 2025-03-06 DIAGNOSIS — E22.2 SIADH (SYNDROME OF INAPPROPRIATE ADH PRODUCTION): ICD-10-CM

## 2025-03-06 DIAGNOSIS — Z91.81 AT HIGH RISK FOR FALLS: ICD-10-CM

## 2025-03-06 DIAGNOSIS — I10 ESSENTIAL HYPERTENSION: Primary | ICD-10-CM

## 2025-03-06 LAB
ANION GAP SERPL CALCULATED.3IONS-SCNC: 13.5 MMOL/L (ref 3–11)
BUN BLDV-MCNC: 25 MG/DL (ref 7–17)
CALCIUM SERPL-MCNC: 9.5 MG/DL (ref 8.4–10.2)
CHLORIDE BLD-SCNC: 102 MMOL/L (ref 98–120)
CO2: 24 MMOL/L (ref 22–31)
CREAT SERPL-MCNC: 0.7 MG/DL (ref 0.5–1)
GFR, ESTIMATED: > 60
GLUCOSE: 81 MG/DL (ref 65–105)
POTASSIUM SERPL-SCNC: 5.5 MMOL/L (ref 3.6–5)
SODIUM BLD-SCNC: 134 MMOL/L (ref 135–145)

## 2025-03-06 PROCEDURE — 99213 OFFICE O/P EST LOW 20 MIN: CPT | Performed by: FAMILY MEDICINE

## 2025-03-06 RX ORDER — ASPIRIN 81 MG/1
81 TABLET ORAL DAILY
COMMUNITY

## 2025-03-06 ASSESSMENT — ENCOUNTER SYMPTOMS
BACK PAIN: 1
SHORTNESS OF BREATH: 0
ABDOMINAL PAIN: 0
RHINORRHEA: 1
COUGH: 0
NAUSEA: 0
VOMITING: 0

## 2025-03-06 NOTE — PROGRESS NOTES
Fourth rib        Subjective:    Elzbieta Dash is a 79 y.o. female with  has a past medical history of Leukoplakia, gingiva, Lung cancer, upper lobe (HCC), and Osteopenia.    Presented to the office today for:  Chief Complaint   Patient presents with    Medication Check     3 month f/u    Knee Pain     Pt states that when she is up walking for sometime she starts to get pain in her left knee. Pt states when she has pain in her left knee she starts to drag her leg.     Frequent/Recurrent UTI     Caregiver at [facility] noticed pt has been a little off the last 3-4 days and was wondering if she might have a UTI       HPI  Patient is a 79-year-old female significant past medical history of lung cancer, osteopenia, TIA, recent right ulnar fracture presenting to clinic for scheduled follow-up.  Patient presents with daughter-in-law and both states patient is improving since last visit.  Patient recently moved to a new [facility] and states quality of care is greatly improved which is helping with her mood symptoms.  Patient describes having left-sided anterior knee pain denies any acute falls or trauma but states pain is exacerbated with prolonged walking or standing.  Also describes having chronic left paraspinal/lumbar back pain which is being relatively well-controlled with her acetaminophen every 6.  Patient denies any dizziness from going from the sitting to the standing position and denies any dizziness or lightheadedness with positional changes.  [facility] staff states had some concerns of mild confusion and daughter-in-law was concerned of possible UTI.  Patient herself denies any dysuria or urinary frequency denies any back pain or fever but they have noticed that she has evidence of confusion with prior recurrent UTIs.  Patient was previously on Keytruda for her lung cancer but was held due to worsening altered mental status.  As per daughter-in-law her mentation is greatly improved once off the Keytruda.  Has upcoming

## 2025-03-07 RX ORDER — LISINOPRIL 5 MG/1
5 TABLET ORAL DAILY
Qty: 90 TABLET | Refills: 0 | Status: SHIPPED | OUTPATIENT
Start: 2025-03-07 | End: 2025-03-07 | Stop reason: ALTCHOICE

## 2025-03-07 NOTE — TELEPHONE ENCOUNTER
On call please refill due to PCP out of office. Pt would like 90 days if possible.       Elzbieta Dash is requesting a refill on the following medication(s):  Requested Prescriptions     Pending Prescriptions Disp Refills    lisinopril (PRINIVIL;ZESTRIL) 5 MG tablet 90 tablet 0     Sig: Take 1 tablet by mouth daily       Last Visit Date (If Applicable):  3/6/2025    Next Visit Date:    6/9/2025

## 2025-03-07 NOTE — RESULT ENCOUNTER NOTE
Please let Eleanor know that Elzbieta's potassium was high.  Have her stop her lisinopril.  Recheck in 1 week.

## 2025-03-10 DIAGNOSIS — E87.5 HIGH POTASSIUM: Primary | ICD-10-CM

## 2025-03-14 LAB
ANION GAP SERPL CALCULATED.3IONS-SCNC: 10.4 MMOL/L (ref 3–11)
BUN BLDV-MCNC: 28 MG/DL (ref 7–17)
CALCIUM SERPL-MCNC: 9 MG/DL (ref 8.4–10.2)
CHLORIDE BLD-SCNC: 106 MMOL/L (ref 98–120)
CO2: 25 MMOL/L (ref 22–31)
CREAT SERPL-MCNC: 0.7 MG/DL (ref 0.5–1)
GFR, ESTIMATED: > 60
GLUCOSE: 84 MG/DL (ref 65–105)
POTASSIUM SERPL-SCNC: 5.4 MMOL/L (ref 3.6–5)
SODIUM BLD-SCNC: 136 MMOL/L (ref 135–145)

## 2025-03-16 ENCOUNTER — RESULTS FOLLOW-UP (OUTPATIENT)
Dept: FAMILY MEDICINE CLINIC | Age: 80
End: 2025-03-16

## 2025-03-17 ENCOUNTER — TELEPHONE (OUTPATIENT)
Dept: FAMILY MEDICINE CLINIC | Age: 80
End: 2025-03-17

## 2025-03-17 DIAGNOSIS — E87.5 SERUM POTASSIUM ELEVATED: Primary | ICD-10-CM

## 2025-03-17 NOTE — RESULT ENCOUNTER NOTE
Please let Kaylan and her staff at [facility] know her potassium is elevated. I would like her to stop her potassium tablets.  Recheck the basic in 2 weeks.

## 2025-03-17 NOTE — TELEPHONE ENCOUNTER
Marga states that this week patient is getting another evaluation to see about getting off of hospice as patient is improving. Marga states that she will keep us informed.    If patient happens to go off of hospice patient will need a new order for oxygen sent to [facility] in [place]    Marga [phone] [id number]

## 2025-03-20 ENCOUNTER — PATIENT MESSAGE (OUTPATIENT)
Dept: FAMILY MEDICINE CLINIC | Age: 80
End: 2025-03-20

## 2025-03-20 DIAGNOSIS — C34.91 SQUAMOUS CELL CARCINOMA OF LUNG, STAGE III, RIGHT: Primary | ICD-10-CM

## 2025-03-20 DIAGNOSIS — F41.9 ANXIETY: ICD-10-CM

## 2025-03-20 DIAGNOSIS — M54.50 ACUTE RIGHT-SIDED LOW BACK PAIN WITHOUT SCIATICA: ICD-10-CM

## 2025-03-21 RX ORDER — GABAPENTIN 300 MG/1
300 CAPSULE ORAL 2 TIMES DAILY
Qty: 180 CAPSULE | Refills: 0 | Status: SHIPPED | OUTPATIENT
Start: 2025-03-21 | End: 2025-06-19

## 2025-03-21 RX ORDER — ESCITALOPRAM OXALATE 10 MG/1
TABLET ORAL
Qty: 90 TABLET | Refills: 3 | Status: SHIPPED | OUTPATIENT
Start: 2025-03-21

## 2025-03-26 RX ORDER — LEVOTHYROXINE SODIUM 25 UG/1
TABLET ORAL
Qty: 90 TABLET | Refills: 3 | Status: SHIPPED | OUTPATIENT
Start: 2025-03-26

## 2025-03-26 NOTE — TELEPHONE ENCOUNTER
Elzbieta Dash is requesting a refill on the following medication(s):  Requested Prescriptions     Pending Prescriptions Disp Refills    levothyroxine (SYNTHROID) 25 MCG tablet 90 tablet 3     Sig: take 1 tablet by mouth once daily       Last Visit Date (If Applicable):  3/6/2025    Next Visit Date:    6/9/2025

## 2025-03-31 LAB
ANION GAP SERPL CALCULATED.3IONS-SCNC: 11.1 MMOL/L (ref 3–11)
BUN BLDV-MCNC: 29 MG/DL (ref 7–17)
CALCIUM SERPL-MCNC: 8.5 MG/DL (ref 8.4–10.2)
CHLORIDE BLD-SCNC: 102 MMOL/L (ref 98–120)
CO2: 25 MMOL/L (ref 22–31)
CREAT SERPL-MCNC: 0.9 MG/DL (ref 0.5–1)
GFR, ESTIMATED: > 60
GLUCOSE: 80 MG/DL (ref 65–105)
POTASSIUM SERPL-SCNC: 5.1 MMOL/L (ref 3.6–5)
SODIUM BLD-SCNC: 133 MMOL/L (ref 135–145)

## 2025-04-01 ENCOUNTER — RESULTS FOLLOW-UP (OUTPATIENT)
Dept: FAMILY MEDICINE CLINIC | Age: 80
End: 2025-04-01

## 2025-05-19 RX ORDER — CARBIDOPA AND LEVODOPA 25; 100 MG/1; MG/1
1 TABLET ORAL 3 TIMES DAILY
Qty: 90 TABLET | Refills: 5 | Status: SHIPPED | OUTPATIENT
Start: 2025-05-19

## 2025-05-19 NOTE — TELEPHONE ENCOUNTER
Elzbieta Dash is requesting a refill on the following medication(s):  Requested Prescriptions     Refused Prescriptions Disp Refills    carbidopa-levodopa (SINEMET)  MG per tablet [Pharmacy Med Name: Carbidopa-Levodopa  MG Oral Tablet] 90 tablet 0     Sig: TAKE 1 TABLET BY MOUTH THREE TIMES DAILY       Last Visit Date (If Applicable):  3/6/2025    Next Visit Date:    6/9/2025

## 2025-06-09 ENCOUNTER — OFFICE VISIT (OUTPATIENT)
Dept: FAMILY MEDICINE CLINIC | Age: 80
End: 2025-06-09
Payer: MEDICARE

## 2025-06-09 VITALS
WEIGHT: 108 LBS | BODY MASS INDEX: 19.14 KG/M2 | HEIGHT: 63 IN | DIASTOLIC BLOOD PRESSURE: 56 MMHG | SYSTOLIC BLOOD PRESSURE: 132 MMHG | OXYGEN SATURATION: 96 % | HEART RATE: 74 BPM

## 2025-06-09 DIAGNOSIS — C34.11 MALIGNANT NEOPLASM OF UPPER LOBE OF RIGHT LUNG (HCC): ICD-10-CM

## 2025-06-09 DIAGNOSIS — D50.0 IRON DEFICIENCY ANEMIA SECONDARY TO BLOOD LOSS (CHRONIC): ICD-10-CM

## 2025-06-09 DIAGNOSIS — K90.0 CELIAC DISEASE: ICD-10-CM

## 2025-06-09 DIAGNOSIS — N18.32 STAGE 3B CHRONIC KIDNEY DISEASE (HCC): ICD-10-CM

## 2025-06-09 DIAGNOSIS — I10 ESSENTIAL HYPERTENSION: Primary | ICD-10-CM

## 2025-06-09 DIAGNOSIS — E03.9 ACQUIRED HYPOTHYROIDISM: ICD-10-CM

## 2025-06-09 PROCEDURE — 99213 OFFICE O/P EST LOW 20 MIN: CPT | Performed by: FAMILY MEDICINE

## 2025-06-09 NOTE — ASSESSMENT & PLAN NOTE
Chronic, at goal (stable), Continue to monitor this regularly    Orders:    CBC with Auto Differential; Future

## 2025-06-09 NOTE — PROGRESS NOTES
[facility] [place]  [address], [address]  [place] [id number]  Dept: [phone]  Dept Fax:[phone]    Elzbieta Dash is a 79 y.o. female who presents today for her medical conditions/complaints as notedbelow.        Assessment/Plan:     Assessment & Plan  1. Cancer.  - Her condition appears to be stable, with no significant concerns at this time.  - Blood work from 04/2025 showed stable blood count, hemoglobin, and iron levels.  - She is advised to maintain an active lifestyle, which will contribute to her overall strength and well-being.  - A CBC, iron panel, and metabolic profile will be ordered to be conducted concurrently with her port flush on 07/14/2025. If her sodium levels remain stable, no further action will be taken.      Assessment & Plan  Essential hypertension   Chronic, at goal (stable), continue on the bisoprolol    Orders:    Comprehensive Metabolic Panel; Future    Celiac disease   Continue with the gluten free diet.  No changes and no sx  Stage 3b chronic kidney disease (HCC)     Orders:    Comprehensive Metabolic Panel; Future    Acquired hypothyroidism  Asx- no changes, labs yearly    Orders:    T4, Free; Future    TSH reflex to FT4; Future    Malignant neoplasm of upper lobe of right lung (HCC)   Stable- no active treatment, no current evidence of progression  Iron deficiency anemia secondary to blood loss (chronic)   Chronic, at goal (stable), Continue to monitor this regularly    Orders:    CBC with Auto Differential; Future         Results  Labs   - Blood count: 04/2025, Stable   - Hemoglobin: 04/2025, Stable   - Iron levels: 04/2025, Fairly good    Lab Results   Component Value Date    WBC 9.4 04/09/2025    HGB 10.1 (L) 04/09/2025    HCT 33.5 (L) 04/09/2025    .8 04/09/2025    CHOL 138 10/02/2024    TRIG 61 10/02/2024    HDL 78 (H) 10/02/2024    ALT 16 10/29/2024    AST 32 10/29/2024     (L) 03/31/2025    K 5.1 (H) 03/31/2025

## 2025-06-09 NOTE — ASSESSMENT & PLAN NOTE
Chronic, at goal (stable), continue on the bisoprolol    Orders:    Comprehensive Metabolic Panel; Future

## 2025-07-14 LAB
ALBUMIN/GLOBULIN RATIO: 1.3 G/DL
ALBUMIN: 3.8 G/DL (ref 3.5–5)
ALP BLD-CCNC: 79 UNITS/L (ref 38–126)
ALT SERPL-CCNC: 9 UNITS/L (ref 4–35)
ANION GAP SERPL CALCULATED.3IONS-SCNC: 5.6 MMOL/L (ref 3–11)
AST SERPL-CCNC: 32 UNITS/L (ref 14–36)
BASOPHILS ABSOLUTE: 0.14 X10E3/?L (ref 0–0.3)
BASOPHILS RELATIVE PERCENT: 1.56 % (ref 0–3)
BILIRUB SERPL-MCNC: 0.2 MG/DL (ref 0.2–1.3)
BUN BLDV-MCNC: 28 MG/DL (ref 7–17)
CALCIUM SERPL-MCNC: 9.5 MG/DL (ref 8.4–10.2)
CHLORIDE BLD-SCNC: 106 MMOL/L (ref 98–120)
CO2: 26 MMOL/L (ref 22–31)
CREAT SERPL-MCNC: 1 MG/DL (ref 0.5–1)
EOSINOPHILS ABSOLUTE: 0.77 X10E3/?L (ref 0–1.1)
EOSINOPHILS RELATIVE PERCENT: 8.43 % (ref 0–10)
GFR, ESTIMATED: 57
GLOBULIN: 2.8 G/DL
GLUCOSE: 121 MG/DL (ref 65–105)
HCT VFR BLD CALC: 36.1 % (ref 37–47)
HEMOGLOBIN: 10.9 G/DL (ref 12–16)
LYMPHOCYTES ABSOLUTE: 1.59 X10E3/?L (ref 1–5.5)
LYMPHOCYTES RELATIVE PERCENT: 17.34 % (ref 20–51.1)
MCH RBC QN AUTO: 29.9 PG (ref 28.5–32.5)
MCHC RBC AUTO-ENTMCNC: 30.1 G/DL (ref 32–37)
MCV RBC AUTO: 99.4 FL (ref 80–94)
MONOCYTES ABSOLUTE: 1.03 X10E3/?L (ref 0.1–1)
MONOCYTES RELATIVE PERCENT: 11.23 % (ref 1.7–9.3)
NEUTROPHILS ABSOLUTE: 5.64 X10E3/?L (ref 2–8.1)
NEUTROPHILS RELATIVE PERCENT: 61.44 % (ref 42.2–75.2)
PDW BLD-RTO: 12.5 % (ref 8.5–15.5)
PLATELET # BLD: 268 THOU/MM3 (ref 130–400)
POTASSIUM SERPL-SCNC: 5 MMOL/L (ref 3.6–5)
RBC # BLD: 3.64 M/UL (ref 4.2–5.4)
SODIUM BLD-SCNC: 137 MMOL/L (ref 135–145)
T4 FREE: 0.88 NG/DL (ref 0.78–2.19)
TOTAL PROTEIN: 6.6 G/DL (ref 6.3–8.2)
TSH SERPL DL<=0.05 MIU/L-ACNC: 1.56 MIU/ML (ref 0.49–4.67)
WBC # BLD: 9.2 THOU/ML3 (ref 4.8–10.8)

## 2025-08-07 RX ORDER — BISOPROLOL FUMARATE 5 MG/1
2.5 TABLET, FILM COATED ORAL DAILY
Qty: 30 TABLET | Refills: 5 | Status: SHIPPED | OUTPATIENT
Start: 2025-08-07